# Patient Record
Sex: MALE | Race: WHITE | Employment: FULL TIME | URBAN - METROPOLITAN AREA
[De-identification: names, ages, dates, MRNs, and addresses within clinical notes are randomized per-mention and may not be internally consistent; named-entity substitution may affect disease eponyms.]

---

## 2017-02-16 ENCOUNTER — TELEPHONE (OUTPATIENT)
Dept: INTERNAL MEDICINE CLINIC | Age: 62
End: 2017-02-16

## 2017-02-16 RX ORDER — AMOXICILLIN 500 MG/1
500 CAPSULE ORAL 3 TIMES DAILY
Qty: 30 CAPSULE | Refills: 0 | Status: SHIPPED | OUTPATIENT
Start: 2017-02-16 | End: 2017-02-26

## 2017-03-30 DIAGNOSIS — G47.09 OTHER INSOMNIA: ICD-10-CM

## 2017-03-30 RX ORDER — ZOLPIDEM TARTRATE 5 MG/1
TABLET ORAL
Qty: 30 TABLET | Refills: 1 | OUTPATIENT
Start: 2017-03-30

## 2017-04-11 ENCOUNTER — TELEPHONE (OUTPATIENT)
Dept: INTERNAL MEDICINE CLINIC | Age: 62
End: 2017-04-11

## 2017-04-11 RX ORDER — AMOXICILLIN 500 MG/1
500 CAPSULE ORAL 3 TIMES DAILY
Qty: 30 CAPSULE | Refills: 0 | Status: SHIPPED | OUTPATIENT
Start: 2017-04-11 | End: 2017-04-21

## 2017-07-19 ENCOUNTER — OFFICE VISIT (OUTPATIENT)
Dept: FAMILY MEDICINE CLINIC | Age: 62
End: 2017-07-19

## 2017-07-19 VITALS
OXYGEN SATURATION: 95 % | RESPIRATION RATE: 16 BRPM | BODY MASS INDEX: 28.64 KG/M2 | HEIGHT: 68 IN | WEIGHT: 189 LBS | HEART RATE: 75 BPM | DIASTOLIC BLOOD PRESSURE: 86 MMHG | SYSTOLIC BLOOD PRESSURE: 128 MMHG

## 2017-07-19 DIAGNOSIS — I10 ESSENTIAL HYPERTENSION: ICD-10-CM

## 2017-07-19 DIAGNOSIS — E78.00 PURE HYPERCHOLESTEROLEMIA: ICD-10-CM

## 2017-07-19 DIAGNOSIS — K40.90 LEFT INGUINAL HERNIA: Primary | ICD-10-CM

## 2017-07-19 PROCEDURE — 99214 OFFICE O/P EST MOD 30 MIN: CPT | Performed by: INTERNAL MEDICINE

## 2017-07-19 RX ORDER — AMLODIPINE BESYLATE 10 MG/1
TABLET ORAL
Qty: 90 TABLET | Refills: 1 | Status: SHIPPED | OUTPATIENT
Start: 2017-07-19 | End: 2018-01-15 | Stop reason: SDUPTHER

## 2017-07-19 RX ORDER — OMEPRAZOLE 20 MG/1
20 CAPSULE, DELAYED RELEASE ORAL DAILY
COMMUNITY
End: 2021-11-30 | Stop reason: ALTCHOICE

## 2017-07-19 ASSESSMENT — ENCOUNTER SYMPTOMS: COUGH: 0

## 2017-07-19 ASSESSMENT — PATIENT HEALTH QUESTIONNAIRE - PHQ9
1. LITTLE INTEREST OR PLEASURE IN DOING THINGS: 0
SUM OF ALL RESPONSES TO PHQ QUESTIONS 1-9: 0
SUM OF ALL RESPONSES TO PHQ9 QUESTIONS 1 & 2: 0
2. FEELING DOWN, DEPRESSED OR HOPELESS: 0

## 2017-07-24 DIAGNOSIS — K40.90 LEFT INGUINAL HERNIA: ICD-10-CM

## 2017-07-24 DIAGNOSIS — E78.00 PURE HYPERCHOLESTEROLEMIA: ICD-10-CM

## 2017-07-24 DIAGNOSIS — I10 ESSENTIAL HYPERTENSION: ICD-10-CM

## 2017-07-24 LAB
A/G RATIO: 1.4 (ref 1.1–2.2)
ALBUMIN SERPL-MCNC: 4.2 G/DL (ref 3.4–5)
ALP BLD-CCNC: 80 U/L (ref 40–129)
ALT SERPL-CCNC: 17 U/L (ref 10–40)
ANION GAP SERPL CALCULATED.3IONS-SCNC: 13 MMOL/L (ref 3–16)
AST SERPL-CCNC: 19 U/L (ref 15–37)
BILIRUB SERPL-MCNC: 0.6 MG/DL (ref 0–1)
BUN BLDV-MCNC: 9 MG/DL (ref 7–20)
CALCIUM SERPL-MCNC: 9.1 MG/DL (ref 8.3–10.6)
CHLORIDE BLD-SCNC: 105 MMOL/L (ref 99–110)
CHOLESTEROL, TOTAL: 135 MG/DL (ref 0–199)
CO2: 25 MMOL/L (ref 21–32)
CREAT SERPL-MCNC: 0.8 MG/DL (ref 0.8–1.3)
GFR AFRICAN AMERICAN: >60
GFR NON-AFRICAN AMERICAN: >60
GLOBULIN: 3.1 G/DL
GLUCOSE BLD-MCNC: 108 MG/DL (ref 70–99)
HCT VFR BLD CALC: 46.1 % (ref 40.5–52.5)
HDLC SERPL-MCNC: 39 MG/DL (ref 40–60)
HEMOGLOBIN: 15.4 G/DL (ref 13.5–17.5)
LDL CHOLESTEROL CALCULATED: 68 MG/DL
MCH RBC QN AUTO: 30.8 PG (ref 26–34)
MCHC RBC AUTO-ENTMCNC: 33.5 G/DL (ref 31–36)
MCV RBC AUTO: 91.9 FL (ref 80–100)
PDW BLD-RTO: 14 % (ref 12.4–15.4)
PLATELET # BLD: 232 K/UL (ref 135–450)
PMV BLD AUTO: 9.8 FL (ref 5–10.5)
POTASSIUM SERPL-SCNC: 4.1 MMOL/L (ref 3.5–5.1)
RBC # BLD: 5.01 M/UL (ref 4.2–5.9)
SODIUM BLD-SCNC: 143 MMOL/L (ref 136–145)
TOTAL PROTEIN: 7.3 G/DL (ref 6.4–8.2)
TRIGL SERPL-MCNC: 138 MG/DL (ref 0–150)
VLDLC SERPL CALC-MCNC: 28 MG/DL
WBC # BLD: 7.4 K/UL (ref 4–11)

## 2017-07-25 ENCOUNTER — INITIAL CONSULT (OUTPATIENT)
Dept: SURGERY | Age: 62
End: 2017-07-25

## 2017-07-25 ENCOUNTER — SURG/PROC ORDERS (OUTPATIENT)
Dept: SURGERY | Age: 62
End: 2017-07-25

## 2017-07-25 VITALS
HEIGHT: 66 IN | DIASTOLIC BLOOD PRESSURE: 82 MMHG | HEART RATE: 74 BPM | BODY MASS INDEX: 30.47 KG/M2 | SYSTOLIC BLOOD PRESSURE: 134 MMHG | WEIGHT: 189.6 LBS

## 2017-07-25 DIAGNOSIS — K40.90 SCROTAL HERNIA: ICD-10-CM

## 2017-07-25 PROCEDURE — 99243 OFF/OP CNSLTJ NEW/EST LOW 30: CPT | Performed by: SURGERY

## 2017-07-25 RX ORDER — SODIUM CHLORIDE 0.9 % (FLUSH) 0.9 %
10 SYRINGE (ML) INJECTION PRN
Status: CANCELLED | OUTPATIENT
Start: 2017-07-25

## 2017-07-25 RX ORDER — SODIUM CHLORIDE 0.9 % (FLUSH) 0.9 %
10 SYRINGE (ML) INJECTION EVERY 12 HOURS SCHEDULED
Status: CANCELLED | OUTPATIENT
Start: 2017-07-25

## 2017-07-27 ENCOUNTER — PAT TELEPHONE (OUTPATIENT)
Dept: PREADMISSION TESTING | Age: 62
End: 2017-07-27

## 2017-07-28 VITALS — WEIGHT: 180 LBS | BODY MASS INDEX: 28.25 KG/M2 | HEIGHT: 67 IN

## 2017-08-02 ENCOUNTER — OFFICE VISIT (OUTPATIENT)
Dept: FAMILY MEDICINE CLINIC | Age: 62
End: 2017-08-02

## 2017-08-02 VITALS
OXYGEN SATURATION: 98 % | DIASTOLIC BLOOD PRESSURE: 78 MMHG | TEMPERATURE: 99 F | HEIGHT: 66 IN | WEIGHT: 188.6 LBS | RESPIRATION RATE: 16 BRPM | HEART RATE: 80 BPM | BODY MASS INDEX: 30.31 KG/M2 | SYSTOLIC BLOOD PRESSURE: 118 MMHG

## 2017-08-02 DIAGNOSIS — E78.00 PURE HYPERCHOLESTEROLEMIA: ICD-10-CM

## 2017-08-02 DIAGNOSIS — I10 ESSENTIAL HYPERTENSION: ICD-10-CM

## 2017-08-02 DIAGNOSIS — Z01.818 PREOP EXAMINATION: ICD-10-CM

## 2017-08-02 DIAGNOSIS — K40.90 SCROTAL HERNIA: Primary | ICD-10-CM

## 2017-08-02 PROCEDURE — 93000 ELECTROCARDIOGRAM COMPLETE: CPT | Performed by: INTERNAL MEDICINE

## 2017-08-02 PROCEDURE — 99243 OFF/OP CNSLTJ NEW/EST LOW 30: CPT | Performed by: INTERNAL MEDICINE

## 2017-08-09 ENCOUNTER — HOSPITAL ENCOUNTER (OUTPATIENT)
Dept: SURGERY | Age: 62
Discharge: HOME | End: 2017-08-09
Attending: SURGERY | Admitting: SURGERY

## 2017-08-09 VITALS
WEIGHT: 188 LBS | BODY MASS INDEX: 30.22 KG/M2 | DIASTOLIC BLOOD PRESSURE: 89 MMHG | RESPIRATION RATE: 18 BRPM | OXYGEN SATURATION: 93 % | HEIGHT: 66 IN | SYSTOLIC BLOOD PRESSURE: 128 MMHG | HEART RATE: 86 BPM

## 2017-08-09 PROCEDURE — 49650 LAP ING HERNIA REPAIR INIT: CPT | Performed by: SURGERY

## 2017-08-09 RX ORDER — OXYCODONE HYDROCHLORIDE AND ACETAMINOPHEN 5; 325 MG/1; MG/1
1 TABLET ORAL PRN
Status: ACTIVE | OUTPATIENT
Start: 2017-08-09 | End: 2017-08-09

## 2017-08-09 RX ORDER — MIDAZOLAM HYDROCHLORIDE 1 MG/ML
2 INJECTION INTRAMUSCULAR; INTRAVENOUS ONCE
Status: DISCONTINUED | OUTPATIENT
Start: 2017-08-09 | End: 2017-08-09

## 2017-08-09 RX ORDER — OXYCODONE HYDROCHLORIDE AND ACETAMINOPHEN 5; 325 MG/1; MG/1
1-2 TABLET ORAL EVERY 4 HOURS PRN
Qty: 20 TABLET | Refills: 0 | Status: SHIPPED | OUTPATIENT
Start: 2017-08-09 | End: 2017-08-16

## 2017-08-09 RX ORDER — DIPHENHYDRAMINE HYDROCHLORIDE 50 MG/ML
12.5 INJECTION INTRAMUSCULAR; INTRAVENOUS
Status: ACTIVE | OUTPATIENT
Start: 2017-08-09 | End: 2017-08-09

## 2017-08-09 RX ORDER — OXYCODONE HYDROCHLORIDE AND ACETAMINOPHEN 5; 325 MG/1; MG/1
2 TABLET ORAL PRN
Status: ACTIVE | OUTPATIENT
Start: 2017-08-09 | End: 2017-08-09

## 2017-08-09 RX ORDER — ONDANSETRON 2 MG/ML
4 INJECTION INTRAMUSCULAR; INTRAVENOUS
Status: ACTIVE | OUTPATIENT
Start: 2017-08-09 | End: 2017-08-09

## 2017-08-09 RX ORDER — PROMETHAZINE HYDROCHLORIDE 25 MG/ML
6.25 INJECTION, SOLUTION INTRAMUSCULAR; INTRAVENOUS
Status: ACTIVE | OUTPATIENT
Start: 2017-08-09 | End: 2017-08-09

## 2017-08-09 RX ORDER — SODIUM CHLORIDE 0.9 % (FLUSH) 0.9 %
10 SYRINGE (ML) INJECTION EVERY 12 HOURS SCHEDULED
Status: DISCONTINUED | OUTPATIENT
Start: 2017-08-09 | End: 2017-08-10 | Stop reason: HOSPADM

## 2017-08-09 RX ORDER — MORPHINE SULFATE 2 MG/ML
1 INJECTION, SOLUTION INTRAMUSCULAR; INTRAVENOUS EVERY 5 MIN PRN
Status: DISCONTINUED | OUTPATIENT
Start: 2017-08-09 | End: 2017-08-10 | Stop reason: HOSPADM

## 2017-08-09 RX ORDER — SODIUM CHLORIDE 0.9 % (FLUSH) 0.9 %
10 SYRINGE (ML) INJECTION PRN
Status: DISCONTINUED | OUTPATIENT
Start: 2017-08-09 | End: 2017-08-10 | Stop reason: HOSPADM

## 2017-08-09 RX ORDER — SODIUM CHLORIDE, SODIUM LACTATE, POTASSIUM CHLORIDE, CALCIUM CHLORIDE 600; 310; 30; 20 MG/100ML; MG/100ML; MG/100ML; MG/100ML
INJECTION, SOLUTION INTRAVENOUS CONTINUOUS
Status: DISCONTINUED | OUTPATIENT
Start: 2017-08-09 | End: 2017-08-10 | Stop reason: HOSPADM

## 2017-08-09 RX ORDER — HYDRALAZINE HYDROCHLORIDE 20 MG/ML
5 INJECTION INTRAMUSCULAR; INTRAVENOUS EVERY 10 MIN PRN
Status: DISCONTINUED | OUTPATIENT
Start: 2017-08-09 | End: 2017-08-10 | Stop reason: HOSPADM

## 2017-08-09 RX ORDER — MORPHINE SULFATE 2 MG/ML
2 INJECTION, SOLUTION INTRAMUSCULAR; INTRAVENOUS EVERY 5 MIN PRN
Status: DISCONTINUED | OUTPATIENT
Start: 2017-08-09 | End: 2017-08-10 | Stop reason: HOSPADM

## 2017-08-09 RX ORDER — LABETALOL HYDROCHLORIDE 5 MG/ML
5 INJECTION, SOLUTION INTRAVENOUS EVERY 10 MIN PRN
Status: DISCONTINUED | OUTPATIENT
Start: 2017-08-09 | End: 2017-08-10 | Stop reason: HOSPADM

## 2017-08-09 RX ORDER — MEPERIDINE HYDROCHLORIDE 50 MG/ML
12.5 INJECTION INTRAMUSCULAR; INTRAVENOUS; SUBCUTANEOUS EVERY 5 MIN PRN
Status: DISCONTINUED | OUTPATIENT
Start: 2017-08-09 | End: 2017-08-10 | Stop reason: HOSPADM

## 2017-08-09 RX ADMIN — LABETALOL HYDROCHLORIDE 5 MG: 5 INJECTION, SOLUTION INTRAVENOUS at 18:56

## 2017-08-09 RX ADMIN — HYDRALAZINE HYDROCHLORIDE 5 MG: 20 INJECTION INTRAMUSCULAR; INTRAVENOUS at 19:18

## 2017-08-09 RX ADMIN — SODIUM CHLORIDE, SODIUM LACTATE, POTASSIUM CHLORIDE, CALCIUM CHLORIDE: 600; 310; 30; 20 INJECTION, SOLUTION INTRAVENOUS at 11:36

## 2017-08-09 ASSESSMENT — PAIN - FUNCTIONAL ASSESSMENT: PAIN_FUNCTIONAL_ASSESSMENT: 0-10

## 2017-08-09 ASSESSMENT — PAIN SCALES - GENERAL: PAINLEVEL_OUTOF10: 0

## 2017-08-22 ENCOUNTER — OFFICE VISIT (OUTPATIENT)
Dept: SURGERY | Age: 62
End: 2017-08-22

## 2017-08-22 VITALS
DIASTOLIC BLOOD PRESSURE: 85 MMHG | HEIGHT: 66 IN | HEART RATE: 70 BPM | WEIGHT: 186.2 LBS | BODY MASS INDEX: 29.92 KG/M2 | SYSTOLIC BLOOD PRESSURE: 133 MMHG

## 2017-08-22 DIAGNOSIS — Z09 POSTOP CHECK: ICD-10-CM

## 2017-08-22 PROCEDURE — 99024 POSTOP FOLLOW-UP VISIT: CPT | Performed by: SURGERY

## 2017-08-28 DIAGNOSIS — E78.00 PURE HYPERCHOLESTEROLEMIA: ICD-10-CM

## 2017-08-29 RX ORDER — ATORVASTATIN CALCIUM 20 MG/1
TABLET, FILM COATED ORAL
Qty: 30 TABLET | Refills: 4 | Status: SHIPPED | OUTPATIENT
Start: 2017-08-29 | End: 2018-01-15 | Stop reason: SDUPTHER

## 2017-09-15 ENCOUNTER — OFFICE VISIT (OUTPATIENT)
Dept: SURGERY | Age: 62
End: 2017-09-15

## 2017-09-15 VITALS
BODY MASS INDEX: 29.41 KG/M2 | DIASTOLIC BLOOD PRESSURE: 84 MMHG | WEIGHT: 183 LBS | HEIGHT: 66 IN | SYSTOLIC BLOOD PRESSURE: 121 MMHG | HEART RATE: 77 BPM

## 2017-09-15 DIAGNOSIS — K40.91 RECURRENT INGUINAL HERNIA OF LEFT SIDE WITHOUT OBSTRUCTION OR GANGRENE: ICD-10-CM

## 2017-09-15 PROCEDURE — 99213 OFFICE O/P EST LOW 20 MIN: CPT | Performed by: SURGERY

## 2017-09-17 ENCOUNTER — SURG/PROC ORDERS (OUTPATIENT)
Dept: SURGERY | Age: 62
End: 2017-09-17

## 2017-09-17 RX ORDER — SODIUM CHLORIDE 0.9 % (FLUSH) 0.9 %
10 SYRINGE (ML) INJECTION EVERY 12 HOURS SCHEDULED
Status: CANCELLED | OUTPATIENT
Start: 2017-09-17

## 2017-09-17 RX ORDER — SODIUM CHLORIDE 0.9 % (FLUSH) 0.9 %
10 SYRINGE (ML) INJECTION PRN
Status: CANCELLED | OUTPATIENT
Start: 2017-09-17

## 2017-09-19 ENCOUNTER — OFFICE VISIT (OUTPATIENT)
Dept: FAMILY MEDICINE CLINIC | Age: 62
End: 2017-09-19

## 2017-09-19 VITALS
HEART RATE: 85 BPM | WEIGHT: 187 LBS | SYSTOLIC BLOOD PRESSURE: 132 MMHG | DIASTOLIC BLOOD PRESSURE: 76 MMHG | HEIGHT: 66 IN | OXYGEN SATURATION: 96 % | BODY MASS INDEX: 30.05 KG/M2

## 2017-09-19 DIAGNOSIS — Z23 FLU VACCINE NEED: ICD-10-CM

## 2017-09-19 DIAGNOSIS — Z01.818 PREOP EXAMINATION: Primary | ICD-10-CM

## 2017-09-19 DIAGNOSIS — K40.91 RECURRENT INGUINAL HERNIA OF LEFT SIDE WITHOUT OBSTRUCTION OR GANGRENE: ICD-10-CM

## 2017-09-19 PROCEDURE — 99213 OFFICE O/P EST LOW 20 MIN: CPT | Performed by: NURSE PRACTITIONER

## 2017-09-19 PROCEDURE — 90688 IIV4 VACCINE SPLT 0.5 ML IM: CPT | Performed by: NURSE PRACTITIONER

## 2017-09-19 PROCEDURE — 90471 IMMUNIZATION ADMIN: CPT | Performed by: NURSE PRACTITIONER

## 2017-09-21 ENCOUNTER — TELEPHONE (OUTPATIENT)
Dept: SURGERY | Age: 62
End: 2017-09-21

## 2017-10-10 ENCOUNTER — OFFICE VISIT (OUTPATIENT)
Dept: SURGERY | Age: 62
End: 2017-10-10

## 2017-10-10 VITALS
BODY MASS INDEX: 30.34 KG/M2 | SYSTOLIC BLOOD PRESSURE: 131 MMHG | HEART RATE: 84 BPM | HEIGHT: 66 IN | DIASTOLIC BLOOD PRESSURE: 83 MMHG | WEIGHT: 188.8 LBS

## 2017-10-10 DIAGNOSIS — Z09 POSTOP CHECK: Primary | ICD-10-CM

## 2017-10-10 PROCEDURE — 99024 POSTOP FOLLOW-UP VISIT: CPT | Performed by: SURGERY

## 2017-10-13 NOTE — PROGRESS NOTES
Surgery Post-op Progress Note    HPI:  Notes reviewed, and agree with documentation in pt's chart. Postoperative Follow-up: Patient presents for 2 week follow-up status post attempted robotic, converted to open repair of recurrent large LIH w/ onlay mesh . Eating a regular diet without difficulty. Bowel movements are Normal.  Pain is controlled with current analgesics. Medication(s) being used: narcotic analgesics including oxycodone/acetaminophen (Percocet, Tylox). .     ROS:    · 10 point review of systems performed; please refer to HPI with pertinent positives, all other ROS are negative    PE:   CONSTITUTIONAL:  awake and alert    ABDOMEN: soft, non-distended, non-tender     INCISION: clean, dry; scrotum w/ residual induration c/w retained hernia sac/seroma, minimally tender      ASSESSMENT:  1.  Postop check     ·       PLAN:    Continue with routine wound care as discussed  Gradually increase activities as tolerated  Follow-up in 2-4 weeks or as needed; please call with questions or concerns  ·

## 2017-10-13 NOTE — PATIENT INSTRUCTIONS
· Continue with routine wound care as discussed  · Gradually increase activities as tolerated  · Follow-up in 2-4 weeks or as needed; please call with questions or concerns

## 2017-10-16 ENCOUNTER — OFFICE VISIT (OUTPATIENT)
Dept: FAMILY MEDICINE CLINIC | Age: 62
End: 2017-10-16

## 2017-10-16 VITALS
OXYGEN SATURATION: 97 % | SYSTOLIC BLOOD PRESSURE: 132 MMHG | DIASTOLIC BLOOD PRESSURE: 86 MMHG | HEART RATE: 89 BPM | WEIGHT: 185 LBS | RESPIRATION RATE: 16 BRPM | HEIGHT: 66 IN | BODY MASS INDEX: 29.73 KG/M2

## 2017-10-16 DIAGNOSIS — I10 ESSENTIAL HYPERTENSION: Primary | ICD-10-CM

## 2017-10-16 DIAGNOSIS — R39.15 URINARY URGENCY: ICD-10-CM

## 2017-10-16 DIAGNOSIS — E78.00 PURE HYPERCHOLESTEROLEMIA: ICD-10-CM

## 2017-10-16 PROCEDURE — 99214 OFFICE O/P EST MOD 30 MIN: CPT | Performed by: INTERNAL MEDICINE

## 2017-10-16 RX ORDER — AMLODIPINE BESYLATE 10 MG/1
TABLET ORAL
Qty: 90 TABLET | Refills: 1 | Status: CANCELLED | OUTPATIENT
Start: 2017-10-16

## 2017-10-16 RX ORDER — TAMSULOSIN HYDROCHLORIDE 0.4 MG/1
0.4 CAPSULE ORAL DAILY
Qty: 30 CAPSULE | Refills: 0 | Status: CANCELLED | OUTPATIENT
Start: 2017-10-16

## 2017-10-16 ASSESSMENT — ENCOUNTER SYMPTOMS: COUGH: 0

## 2017-10-16 NOTE — PROGRESS NOTES
Subjective:      Patient ID: Sydney Causey is a 57 y.o. male. HPI    Review of Systems   Constitutional: Negative for activity change. HENT: Negative for congestion. Respiratory: Negative for cough. Cardiovascular: Negative for chest pain. Endocrine: Negative. Genitourinary: Negative. Musculoskeletal: Positive for arthralgias. Allergic/Immunologic: Positive for environmental allergies. Psychiatric/Behavioral: Positive for sleep disturbance.      Lab Results   Component Value Date    WBC 11.5 (H) 09/28/2017    HGB 13.9 09/28/2017    HCT 41.5 09/28/2017    MCV 89.8 09/28/2017     09/28/2017     Lab Results   Component Value Date    CHOL 135 07/24/2017    CHOL 157 11/26/2016    CHOL 160 01/25/2016     Lab Results   Component Value Date    TRIG 138 07/24/2017    TRIG 140 11/26/2016    TRIG 105 01/25/2016     Lab Results   Component Value Date    HDL 39 (L) 07/24/2017    HDL 39 (L) 11/26/2016    HDL 40 01/25/2016     Lab Results   Component Value Date    LDLCALC 68 07/24/2017    LDLCALC 90 11/26/2016    LDLCALC 99 01/25/2016     Lab Results   Component Value Date    LABVLDL 28 07/24/2017    LABVLDL 28 11/26/2016    LABVLDL 21 01/25/2016     No results found for: [facility]    Chemistry        Component Value Date/Time     (L) 09/28/2017 0643    K 4.3 09/28/2017 0643    CL 98 (L) 09/28/2017 0643    CO2 26 09/28/2017 0643    BUN 10 09/28/2017 0643    CREATININE 0.9 09/28/2017 0643        Component Value Date/Time    CALCIUM 8.2 (L) 09/28/2017 0643    ALKPHOS 80 07/24/2017 0819    AST 19 07/24/2017 0819    ALT 17 07/24/2017 0819    BILITOT 0.6 07/24/2017 0819            Patient Active Problem List   Diagnosis    Hypertension    Hyperlipidemia    Insomnia    Acute sinusitis    Allergic rhinitis    Scrotal hernia    Postop check    Recurrent inguinal hernia of left side without obstruction or gangrene       Outpatient Prescriptions Marked as Taking for the 10/16/17 encounter (Office Visit) with Tyler Monge MD   Medication Sig Dispense Refill    tamsulosin (FLOMAX) 0.4 MG capsule Take 1 capsule by mouth daily 30 capsule 0    atorvastatin (LIPITOR) 20 MG tablet TAKE 1 TABLET BY MOUTH ONE TIME A DAY  30 tablet 4    omeprazole (PRILOSEC) 20 MG delayed release capsule Take 20 mg by mouth daily      amLODIPine (NORVASC) 10 MG tablet TAKE ONE TABLET BY MOUTH DAILY 90 tablet 1       Allergies   Allergen Reactions    Guaifenesin Er Other (See Comments)     \"made my cough worse and fluid built up in my lungs\"    Z-Vik [Azithromycin] Other (See Comments)     Closes up sinuses    Erythromycin Nausea And Vomiting    Vicodin [Hydrocodone-Acetaminophen] Nausea And Vomiting       Social History   Substance Use Topics    Smoking status: Never Smoker    Smokeless tobacco: Never Used    Alcohol use No       Objective:   /86 (Site: Left Arm, Position: Sitting, Cuff Size: Large Adult)   Pulse 89   Resp 16   Ht 5' 6\" (1.676 m)   Wt 185 lb (83.9 kg)   SpO2 97%   BMI 29.86 kg/m²     Physical Exam   Constitutional: He appears well-developed and well-nourished. HENT:   Head: Normocephalic. Nose: Nose normal.          Neck: Normal range of motion. Neck supple. Cardiovascular: Normal rate and regular rhythm. Pulmonary/Chest: Effort normal and breath sounds normal.   Abdominal: Soft. Musculoskeletal: Normal range of motion. He exhibits no edema. Psychiatric: He has a normal mood and affect. Nursing note and vitals reviewed. Assessment:/plan:     1. Essential hypertension      2. Pure hypercholesterolemia      3.  Urinary urgency  -on flomax                 Tyler Monge MD

## 2017-11-28 ENCOUNTER — OFFICE VISIT (OUTPATIENT)
Dept: SURGERY | Age: 62
End: 2017-11-28

## 2017-11-28 VITALS
HEIGHT: 66 IN | WEIGHT: 188.4 LBS | HEART RATE: 71 BPM | SYSTOLIC BLOOD PRESSURE: 136 MMHG | DIASTOLIC BLOOD PRESSURE: 86 MMHG | BODY MASS INDEX: 30.28 KG/M2

## 2017-11-28 DIAGNOSIS — Z09 POSTOP CHECK: Primary | ICD-10-CM

## 2017-11-28 PROCEDURE — 99024 POSTOP FOLLOW-UP VISIT: CPT | Performed by: SURGERY

## 2017-11-28 NOTE — PROGRESS NOTES
Surgery Post-op Progress Note    HPI:  Notes reviewed, and agree with documentation in pt's chart. Postoperative Follow-up: Patient presents for 2d POV s/p open repair of large LIH. Doing well, minimal residual L groin pain, swelling per patient. Resuming normal activities. ROS:    · 10 point review of systems performed; please refer to HPI with pertinent positives, all other ROS are negative    PE:   CONSTITUTIONAL:  awake and alert    ABDOMEN: soft, non-distended, non-tender     INCISION: clean, dry, no drainage, healed, minimal, resolving induration over inguinal canal on left      ASSESSMENT:  1.  Postop check     · Doing well, good healing, no signs of residual or recurrent hernia      PLAN:    Continue with routine wound care as discussed  Gradually increase activities as tolerated  Follow-up as needed; please call with questions or concerns  ·

## 2017-12-27 DIAGNOSIS — I10 ESSENTIAL HYPERTENSION: ICD-10-CM

## 2017-12-27 DIAGNOSIS — E78.00 PURE HYPERCHOLESTEROLEMIA: Primary | ICD-10-CM

## 2017-12-27 LAB
A/G RATIO: 1.4 (ref 1.1–2.2)
ALBUMIN SERPL-MCNC: 4.7 G/DL (ref 3.4–5)
ALP BLD-CCNC: 92 U/L (ref 40–129)
ALT SERPL-CCNC: 14 U/L (ref 10–40)
ANION GAP SERPL CALCULATED.3IONS-SCNC: 15 MMOL/L (ref 3–16)
AST SERPL-CCNC: 16 U/L (ref 15–37)
BILIRUB SERPL-MCNC: 0.6 MG/DL (ref 0–1)
BUN BLDV-MCNC: 15 MG/DL (ref 7–20)
CALCIUM SERPL-MCNC: 9.9 MG/DL (ref 8.3–10.6)
CHLORIDE BLD-SCNC: 103 MMOL/L (ref 99–110)
CHOLESTEROL, TOTAL: 186 MG/DL (ref 0–199)
CO2: 27 MMOL/L (ref 21–32)
CREAT SERPL-MCNC: 1 MG/DL (ref 0.8–1.3)
GFR AFRICAN AMERICAN: >60
GFR NON-AFRICAN AMERICAN: >60
GLOBULIN: 3.4 G/DL
GLUCOSE BLD-MCNC: 102 MG/DL (ref 70–99)
HDLC SERPL-MCNC: 41 MG/DL (ref 40–60)
LDL CHOLESTEROL CALCULATED: 113 MG/DL
POTASSIUM SERPL-SCNC: 4.6 MMOL/L (ref 3.5–5.1)
SODIUM BLD-SCNC: 145 MMOL/L (ref 136–145)
TOTAL PROTEIN: 8.1 G/DL (ref 6.4–8.2)
TRIGL SERPL-MCNC: 159 MG/DL (ref 0–150)
VLDLC SERPL CALC-MCNC: 32 MG/DL

## 2018-01-15 ENCOUNTER — OFFICE VISIT (OUTPATIENT)
Dept: FAMILY MEDICINE CLINIC | Age: 63
End: 2018-01-15

## 2018-01-15 VITALS
WEIGHT: 187 LBS | HEIGHT: 66 IN | DIASTOLIC BLOOD PRESSURE: 90 MMHG | HEART RATE: 84 BPM | SYSTOLIC BLOOD PRESSURE: 128 MMHG | BODY MASS INDEX: 30.05 KG/M2 | OXYGEN SATURATION: 95 %

## 2018-01-15 DIAGNOSIS — I10 ESSENTIAL HYPERTENSION: Primary | ICD-10-CM

## 2018-01-15 DIAGNOSIS — K21.9 GASTROESOPHAGEAL REFLUX DISEASE WITHOUT ESOPHAGITIS: ICD-10-CM

## 2018-01-15 DIAGNOSIS — E78.00 PURE HYPERCHOLESTEROLEMIA: ICD-10-CM

## 2018-01-15 DIAGNOSIS — I10 ESSENTIAL HYPERTENSION: ICD-10-CM

## 2018-01-15 PROCEDURE — G8417 CALC BMI ABV UP PARAM F/U: HCPCS | Performed by: INTERNAL MEDICINE

## 2018-01-15 PROCEDURE — G8484 FLU IMMUNIZE NO ADMIN: HCPCS | Performed by: INTERNAL MEDICINE

## 2018-01-15 PROCEDURE — 1036F TOBACCO NON-USER: CPT | Performed by: INTERNAL MEDICINE

## 2018-01-15 PROCEDURE — G8427 DOCREV CUR MEDS BY ELIG CLIN: HCPCS | Performed by: INTERNAL MEDICINE

## 2018-01-15 PROCEDURE — 3017F COLORECTAL CA SCREEN DOC REV: CPT | Performed by: INTERNAL MEDICINE

## 2018-01-15 PROCEDURE — 99214 OFFICE O/P EST MOD 30 MIN: CPT | Performed by: INTERNAL MEDICINE

## 2018-01-15 RX ORDER — AMLODIPINE BESYLATE 10 MG/1
TABLET ORAL
Qty: 90 TABLET | Refills: 1 | Status: CANCELLED | OUTPATIENT
Start: 2018-01-15

## 2018-01-15 RX ORDER — AMLODIPINE BESYLATE 10 MG/1
TABLET ORAL
Qty: 90 TABLET | Refills: 1 | Status: SHIPPED | OUTPATIENT
Start: 2018-01-15 | End: 2018-06-04 | Stop reason: SDUPTHER

## 2018-01-15 RX ORDER — ATORVASTATIN CALCIUM 20 MG/1
20 TABLET, FILM COATED ORAL DAILY
Qty: 90 TABLET | Refills: 1 | Status: SHIPPED | OUTPATIENT
Start: 2018-01-15 | End: 2018-05-01 | Stop reason: SDUPTHER

## 2018-01-15 ASSESSMENT — ENCOUNTER SYMPTOMS: COUGH: 0

## 2018-01-15 NOTE — PROGRESS NOTES
Subjective:      Patient ID: Brady Joseph is a 60 y.o. male.     HPI    Review of Systems    Objective:   Physical Exam    Assessment:      ***      Plan:      ***

## 2018-01-15 NOTE — PATIENT INSTRUCTIONS
you take decongestants or anti-inflammatory medicine, such as ibuprofen. Some of these medicines can raise blood pressure. · Learn how to check your blood pressure at home. Lifestyle changes  · Stay at a healthy weight. This is especially important if you put on weight around the waist. Losing even 10 pounds can help you lower your blood pressure. · If your doctor recommends it, get more exercise. Walking is a good choice. Bit by bit, increase the amount you walk every day. Try for at least 30 minutes on most days of the week. You also may want to swim, bike, or do other activities. · Avoid or limit alcohol. Talk to your doctor about whether you can drink any alcohol. · Try to limit how much sodium you eat to less than 2,300 milligrams (mg) a day. Your doctor may ask you to try to eat less than 1,500 mg a day. · Eat plenty of fruits (such as bananas and oranges), vegetables, legumes, whole grains, and low-fat dairy products. · Lower the amount of saturated fat in your diet. Saturated fat is found in animal products such as milk, cheese, and meat. Limiting these foods may help you lose weight and also lower your risk for heart disease. · Do not smoke. Smoking increases your risk for heart attack and stroke. If you need help quitting, talk to your doctor about stop-smoking programs and medicines. These can increase your chances of quitting for good. When should you call for help? Call 911 anytime you think you may need emergency care. This may mean having symptoms that suggest that your blood pressure is causing a serious heart or blood vessel problem. Your blood pressure may be over 180/110. ? For example, call 911 if:  ? · You have symptoms of a heart attack. These may include:  ¨ Chest pain or pressure, or a strange feeling in the chest.  ¨ Sweating. ¨ Shortness of breath. ¨ Nausea or vomiting.   ¨ Pain, pressure, or a strange feeling in the back, neck, jaw, or upper belly or in one or both shoulders or arms.  ¨ Lightheadedness or sudden weakness. ¨ A fast or irregular heartbeat. ? · You have symptoms of a stroke. These may include:  ¨ Sudden numbness, tingling, weakness, or loss of movement in your face, arm, or leg, especially on only one side of your body. ¨ Sudden vision changes. ¨ Sudden trouble speaking. ¨ Sudden confusion or trouble understanding simple statements. ¨ Sudden problems with walking or balance. ¨ A sudden, severe headache that is different from past headaches. ? · You have severe back or belly pain. ?Do not wait until your blood pressure comes down on its own. Get help right away. ?Call your doctor now or seek immediate care if:  ? · Your blood pressure is much higher than normal (such as 180/110 or higher), but you don't have symptoms. ? · You think high blood pressure is causing symptoms, such as:  ¨ Severe headache. ¨ Blurry vision. ? Watch closely for changes in your health, and be sure to contact your doctor if:  ? · Your blood pressure measures 140/90 or higher at least 2 times. That means the top number is 140 or higher or the bottom number is 90 or higher, or both. ? · You think you may be having side effects from your blood pressure medicine. ? · Your blood pressure is usually normal, but it goes above normal at least 2 times. Where can you learn more? Go to https://[email or web address]/Protect. org and sign in to your [employer] account. Enter Z951 in the Cotap box to learn more about \"High Blood Pressure: Care Instructions. \"     If you do not have an account, please click on the \"Sign Up Now\" link. Current as of: Vandana 10, 2017  Content Version: 11.5  © 8642-1450 Healthwise, Incorporated. Care instructions adapted under license by [facility] [employer] [facility] ([facility]).  If you have questions about a medical condition or this instruction, always ask your healthcare professional. Siria Watkins any warranty or liability for your use of this

## 2018-04-12 PROBLEM — Z09 POSTOP CHECK: Status: RESOLVED | Noted: 2017-08-22 | Resolved: 2018-04-12

## 2018-05-01 DIAGNOSIS — E78.00 PURE HYPERCHOLESTEROLEMIA: ICD-10-CM

## 2018-05-02 RX ORDER — ATORVASTATIN CALCIUM 20 MG/1
TABLET, FILM COATED ORAL
Qty: 90 TABLET | Refills: 0 | Status: SHIPPED | OUTPATIENT
Start: 2018-05-02 | End: 2018-10-01 | Stop reason: SDUPTHER

## 2018-06-04 ENCOUNTER — OFFICE VISIT (OUTPATIENT)
Dept: FAMILY MEDICINE CLINIC | Age: 63
End: 2018-06-04

## 2018-06-04 VITALS
HEIGHT: 66 IN | OXYGEN SATURATION: 97 % | WEIGHT: 191 LBS | HEART RATE: 70 BPM | DIASTOLIC BLOOD PRESSURE: 84 MMHG | SYSTOLIC BLOOD PRESSURE: 122 MMHG | BODY MASS INDEX: 30.7 KG/M2 | RESPIRATION RATE: 16 BRPM

## 2018-06-04 DIAGNOSIS — K21.9 GASTROESOPHAGEAL REFLUX DISEASE WITHOUT ESOPHAGITIS: ICD-10-CM

## 2018-06-04 DIAGNOSIS — E78.00 PURE HYPERCHOLESTEROLEMIA: ICD-10-CM

## 2018-06-04 DIAGNOSIS — I10 ESSENTIAL HYPERTENSION: Primary | ICD-10-CM

## 2018-06-04 DIAGNOSIS — R21 SKIN RASH: ICD-10-CM

## 2018-06-04 PROCEDURE — G8427 DOCREV CUR MEDS BY ELIG CLIN: HCPCS | Performed by: INTERNAL MEDICINE

## 2018-06-04 PROCEDURE — G8417 CALC BMI ABV UP PARAM F/U: HCPCS | Performed by: INTERNAL MEDICINE

## 2018-06-04 PROCEDURE — 3017F COLORECTAL CA SCREEN DOC REV: CPT | Performed by: INTERNAL MEDICINE

## 2018-06-04 PROCEDURE — 99214 OFFICE O/P EST MOD 30 MIN: CPT | Performed by: INTERNAL MEDICINE

## 2018-06-04 PROCEDURE — 1036F TOBACCO NON-USER: CPT | Performed by: INTERNAL MEDICINE

## 2018-06-04 RX ORDER — AMLODIPINE BESYLATE 10 MG/1
TABLET ORAL
Qty: 90 TABLET | Refills: 1 | Status: SHIPPED | OUTPATIENT
Start: 2018-06-04 | End: 2019-01-07 | Stop reason: SDUPTHER

## 2018-06-04 RX ORDER — FLUOCINONIDE 0.5 MG/G
OINTMENT TOPICAL
Qty: 15 G | Refills: 1 | Status: SHIPPED | OUTPATIENT
Start: 2018-06-04 | End: 2018-06-11

## 2018-06-04 ASSESSMENT — ENCOUNTER SYMPTOMS: COUGH: 0

## 2018-06-15 DIAGNOSIS — I10 ESSENTIAL HYPERTENSION: ICD-10-CM

## 2018-06-15 DIAGNOSIS — E78.00 PURE HYPERCHOLESTEROLEMIA: ICD-10-CM

## 2018-06-16 LAB
A/G RATIO: 1.6 (ref 1.1–2.2)
ALBUMIN SERPL-MCNC: 4.5 G/DL (ref 3.4–5)
ALP BLD-CCNC: 81 U/L (ref 40–129)
ALT SERPL-CCNC: 16 U/L (ref 10–40)
ANION GAP SERPL CALCULATED.3IONS-SCNC: 12 MMOL/L (ref 3–16)
AST SERPL-CCNC: 17 U/L (ref 15–37)
BILIRUB SERPL-MCNC: 0.8 MG/DL (ref 0–1)
BUN BLDV-MCNC: 13 MG/DL (ref 7–20)
CALCIUM SERPL-MCNC: 9.4 MG/DL (ref 8.3–10.6)
CHLORIDE BLD-SCNC: 97 MMOL/L (ref 99–110)
CHOLESTEROL, TOTAL: 152 MG/DL (ref 0–199)
CO2: 29 MMOL/L (ref 21–32)
CREAT SERPL-MCNC: 0.8 MG/DL (ref 0.8–1.3)
GFR AFRICAN AMERICAN: >60
GFR NON-AFRICAN AMERICAN: >60
GLOBULIN: 2.8 G/DL
GLUCOSE BLD-MCNC: 96 MG/DL (ref 70–99)
HDLC SERPL-MCNC: 43 MG/DL (ref 40–60)
LDL CHOLESTEROL CALCULATED: 86 MG/DL
POTASSIUM SERPL-SCNC: 4.7 MMOL/L (ref 3.5–5.1)
SODIUM BLD-SCNC: 138 MMOL/L (ref 136–145)
TOTAL PROTEIN: 7.3 G/DL (ref 6.4–8.2)
TRIGL SERPL-MCNC: 116 MG/DL (ref 0–150)
VLDLC SERPL CALC-MCNC: 23 MG/DL

## 2018-10-01 ENCOUNTER — OFFICE VISIT (OUTPATIENT)
Dept: FAMILY MEDICINE CLINIC | Age: 63
End: 2018-10-01
Payer: COMMERCIAL

## 2018-10-01 VITALS
HEIGHT: 66 IN | HEART RATE: 92 BPM | WEIGHT: 185 LBS | DIASTOLIC BLOOD PRESSURE: 80 MMHG | SYSTOLIC BLOOD PRESSURE: 128 MMHG | OXYGEN SATURATION: 96 % | BODY MASS INDEX: 29.73 KG/M2

## 2018-10-01 DIAGNOSIS — I10 ESSENTIAL HYPERTENSION: Primary | ICD-10-CM

## 2018-10-01 DIAGNOSIS — K21.9 GASTROESOPHAGEAL REFLUX DISEASE WITHOUT ESOPHAGITIS: ICD-10-CM

## 2018-10-01 DIAGNOSIS — E78.00 PURE HYPERCHOLESTEROLEMIA: ICD-10-CM

## 2018-10-01 PROCEDURE — G8427 DOCREV CUR MEDS BY ELIG CLIN: HCPCS | Performed by: INTERNAL MEDICINE

## 2018-10-01 PROCEDURE — 99214 OFFICE O/P EST MOD 30 MIN: CPT | Performed by: INTERNAL MEDICINE

## 2018-10-01 PROCEDURE — G8484 FLU IMMUNIZE NO ADMIN: HCPCS | Performed by: INTERNAL MEDICINE

## 2018-10-01 PROCEDURE — 1036F TOBACCO NON-USER: CPT | Performed by: INTERNAL MEDICINE

## 2018-10-01 PROCEDURE — 3017F COLORECTAL CA SCREEN DOC REV: CPT | Performed by: INTERNAL MEDICINE

## 2018-10-01 PROCEDURE — G8417 CALC BMI ABV UP PARAM F/U: HCPCS | Performed by: INTERNAL MEDICINE

## 2018-10-01 RX ORDER — ATORVASTATIN CALCIUM 20 MG/1
20 TABLET, FILM COATED ORAL DAILY
Qty: 90 TABLET | Refills: 2 | Status: SHIPPED | OUTPATIENT
Start: 2018-10-01 | End: 2019-01-07 | Stop reason: SDUPTHER

## 2018-10-01 ASSESSMENT — PATIENT HEALTH QUESTIONNAIRE - PHQ9
SUM OF ALL RESPONSES TO PHQ9 QUESTIONS 1 & 2: 0
SUM OF ALL RESPONSES TO PHQ QUESTIONS 1-9: 0
1. LITTLE INTEREST OR PLEASURE IN DOING THINGS: 0
2. FEELING DOWN, DEPRESSED OR HOPELESS: 0
SUM OF ALL RESPONSES TO PHQ QUESTIONS 1-9: 0

## 2018-10-01 ASSESSMENT — ENCOUNTER SYMPTOMS: COUGH: 0

## 2018-10-01 NOTE — PROGRESS NOTES
07/24/2017       Chemistry        Component Value Date/Time     06/15/2018 1328    K 4.7 06/15/2018 1328    CL 97 (L) 06/15/2018 1328    CO2 29 06/15/2018 1328    BUN 13 06/15/2018 1328    CREATININE 0.8 06/15/2018 1328        Component Value Date/Time    CALCIUM 9.4 06/15/2018 1328    ALKPHOS 81 06/15/2018 1328    AST 17 06/15/2018 1328    ALT 16 06/15/2018 1328    BILITOT 0.8 06/15/2018 1328          Review of Systems   Constitutional: Negative for activity change. HENT: Negative for congestion. Respiratory: Negative for cough. Cardiovascular: Negative for chest pain. Endocrine: Negative. Genitourinary: Negative. Musculoskeletal: Positive for arthralgias. Allergic/Immunologic: Positive for environmental allergies. Psychiatric/Behavioral: Positive for sleep disturbance. Prior to Visit Medications    Medication Sig Taking? Authorizing Provider   amLODIPine (NORVASC) 10 MG tablet TAKE ONE TABLET BY MOUTH DAILY Yes Monse Dyson MD   atorvastatin (LIPITOR) 20 MG tablet TAKE 1 TABLET BY MOUTH ONE TIME A DAY  Yes Monse Dyson MD   omeprazole (PRILOSEC) 20 MG delayed release capsule Take 20 mg by mouth daily Yes Historical Provider, MD        Social History   Substance Use Topics    Smoking status: Never Smoker    Smokeless tobacco: Never Used    Alcohol use No        Vitals:    10/01/18 1639 10/01/18 1642   BP: (!) 130/96 (!) 130/96   Site: Right Upper Arm Left Upper Arm   Position: Sitting Sitting   Cuff Size: Medium Adult Medium Adult   Pulse: 92    SpO2: 96%    Weight: 185 lb (83.9 kg)    Height: 5' 6\" (1.676 m)      Estimated body mass index is 29.86 kg/m² as calculated from the following:    Height as of this encounter: 5' 6\" (1.676 m). Weight as of this encounter: 185 lb (83.9 kg). Physical Exam   Constitutional: He appears well-developed and well-nourished. HENT:   Head: Normocephalic. Neck: Normal range of motion. Neck supple.    Cardiovascular: Normal rate

## 2019-01-07 ENCOUNTER — OFFICE VISIT (OUTPATIENT)
Dept: FAMILY MEDICINE CLINIC | Age: 64
End: 2019-01-07
Payer: COMMERCIAL

## 2019-01-07 VITALS
DIASTOLIC BLOOD PRESSURE: 80 MMHG | BODY MASS INDEX: 28.77 KG/M2 | OXYGEN SATURATION: 97 % | HEART RATE: 90 BPM | SYSTOLIC BLOOD PRESSURE: 136 MMHG | WEIGHT: 179 LBS | HEIGHT: 66 IN

## 2019-01-07 DIAGNOSIS — K21.9 GASTROESOPHAGEAL REFLUX DISEASE WITHOUT ESOPHAGITIS: ICD-10-CM

## 2019-01-07 DIAGNOSIS — R63.4 WEIGHT LOSS, UNINTENTIONAL: ICD-10-CM

## 2019-01-07 DIAGNOSIS — I10 ESSENTIAL HYPERTENSION: Primary | ICD-10-CM

## 2019-01-07 DIAGNOSIS — Z12.11 SCREENING FOR COLON CANCER: ICD-10-CM

## 2019-01-07 DIAGNOSIS — E78.00 PURE HYPERCHOLESTEROLEMIA: ICD-10-CM

## 2019-01-07 PROCEDURE — 99214 OFFICE O/P EST MOD 30 MIN: CPT | Performed by: INTERNAL MEDICINE

## 2019-01-07 PROCEDURE — G8427 DOCREV CUR MEDS BY ELIG CLIN: HCPCS | Performed by: INTERNAL MEDICINE

## 2019-01-07 PROCEDURE — G8484 FLU IMMUNIZE NO ADMIN: HCPCS | Performed by: INTERNAL MEDICINE

## 2019-01-07 PROCEDURE — G8417 CALC BMI ABV UP PARAM F/U: HCPCS | Performed by: INTERNAL MEDICINE

## 2019-01-07 PROCEDURE — 3017F COLORECTAL CA SCREEN DOC REV: CPT | Performed by: INTERNAL MEDICINE

## 2019-01-07 PROCEDURE — 1036F TOBACCO NON-USER: CPT | Performed by: INTERNAL MEDICINE

## 2019-01-07 RX ORDER — ATORVASTATIN CALCIUM 20 MG/1
20 TABLET, FILM COATED ORAL DAILY
Qty: 90 TABLET | Refills: 2 | Status: SHIPPED | OUTPATIENT
Start: 2019-01-07 | End: 2019-08-05 | Stop reason: SDUPTHER

## 2019-01-07 RX ORDER — AMLODIPINE BESYLATE 10 MG/1
TABLET ORAL
Qty: 90 TABLET | Refills: 1 | Status: SHIPPED | OUTPATIENT
Start: 2019-01-07 | End: 2019-07-22 | Stop reason: SDUPTHER

## 2019-01-12 ASSESSMENT — ENCOUNTER SYMPTOMS: COUGH: 0

## 2019-07-22 DIAGNOSIS — I10 ESSENTIAL HYPERTENSION: ICD-10-CM

## 2019-07-22 RX ORDER — AMLODIPINE BESYLATE 10 MG/1
TABLET ORAL
Qty: 30 TABLET | Refills: 0 | Status: SHIPPED | OUTPATIENT
Start: 2019-07-22 | End: 2019-08-05 | Stop reason: SDUPTHER

## 2019-08-04 ASSESSMENT — ENCOUNTER SYMPTOMS: COUGH: 0

## 2019-08-05 ENCOUNTER — OFFICE VISIT (OUTPATIENT)
Dept: FAMILY MEDICINE CLINIC | Age: 64
End: 2019-08-05
Payer: COMMERCIAL

## 2019-08-05 VITALS
BODY MASS INDEX: 31.02 KG/M2 | OXYGEN SATURATION: 97 % | HEART RATE: 80 BPM | HEIGHT: 66 IN | WEIGHT: 193 LBS | SYSTOLIC BLOOD PRESSURE: 138 MMHG | DIASTOLIC BLOOD PRESSURE: 88 MMHG

## 2019-08-05 DIAGNOSIS — I10 ESSENTIAL HYPERTENSION: Primary | ICD-10-CM

## 2019-08-05 DIAGNOSIS — E78.00 PURE HYPERCHOLESTEROLEMIA: ICD-10-CM

## 2019-08-05 DIAGNOSIS — K21.9 GASTROESOPHAGEAL REFLUX DISEASE WITHOUT ESOPHAGITIS: ICD-10-CM

## 2019-08-05 PROCEDURE — 1036F TOBACCO NON-USER: CPT | Performed by: INTERNAL MEDICINE

## 2019-08-05 PROCEDURE — 99214 OFFICE O/P EST MOD 30 MIN: CPT | Performed by: INTERNAL MEDICINE

## 2019-08-05 PROCEDURE — 3017F COLORECTAL CA SCREEN DOC REV: CPT | Performed by: INTERNAL MEDICINE

## 2019-08-05 PROCEDURE — G8417 CALC BMI ABV UP PARAM F/U: HCPCS | Performed by: INTERNAL MEDICINE

## 2019-08-05 PROCEDURE — G8427 DOCREV CUR MEDS BY ELIG CLIN: HCPCS | Performed by: INTERNAL MEDICINE

## 2019-08-05 RX ORDER — ATORVASTATIN CALCIUM 20 MG/1
20 TABLET, FILM COATED ORAL DAILY
Qty: 90 TABLET | Refills: 1 | Status: SHIPPED | OUTPATIENT
Start: 2019-08-05 | End: 2020-02-11

## 2019-08-05 RX ORDER — AMLODIPINE BESYLATE 10 MG/1
TABLET ORAL
Qty: 90 TABLET | Refills: 1 | Status: SHIPPED | OUTPATIENT
Start: 2019-08-05 | End: 2020-02-17

## 2019-08-05 ASSESSMENT — PATIENT HEALTH QUESTIONNAIRE - PHQ9
2. FEELING DOWN, DEPRESSED OR HOPELESS: 0
SUM OF ALL RESPONSES TO PHQ QUESTIONS 1-9: 0
1. LITTLE INTEREST OR PLEASURE IN DOING THINGS: 0
SUM OF ALL RESPONSES TO PHQ QUESTIONS 1-9: 0
SUM OF ALL RESPONSES TO PHQ9 QUESTIONS 1 & 2: 0

## 2019-08-05 NOTE — PATIENT INSTRUCTIONS
[facility] [address]    [address] [address]    Phone:  [phone]    Fax:  [phone]       Thank You for choosing us for your health care visit. We are glad to serve you and happy to provide you with this summary of your visit. Please help us to ensure we have accurate records. If you find anything that needs to be changed, please let our staff know as soon as possible.          Your Demographic Information     Patient Name Sex Lj Lebron Male 1989       Ethnic Group Patient Race    Not of  or  Origin White      Your Visit Details     Date & Time Provider Department    2017 8:45 AM Dom V[facility]quez [facility]      Your Upcoming Appointment*(Max 10)     Thursday May 25, 2017 10:45 AM CDT   Follow-up Visit with Elle Culp PA-C   [facility] ([facility])    [address] [address] [phone]              Your Vitals Were     BP Height Weight BMI Smoking Status       124/80 6' 2\" (1.88 m) 208 lb (94.3 kg) 26.71 kg/m2 Current Every Day Smoker       Medications Prescribed or Re-Ordered Today     None      Your Current Medications Are        Disp Refills Start End    CREATINE PO        Sig - Route: Take by mouth 2 times daily. - Oral    Class: Historical Med    HYDROcodone-acetaminophen (NORCO) 5-325 MG per tablet 40 tablet 0 2017     Sig - Route: Take 1-2 tablets by mouth every 4 hours as needed for Pain. - Oral    glycopyrrolate (ROBINUL) 1 MG tablet 30 tablet prn 2016     Sig - Route: Take 1 tablet by mouth daily. - Oral    Class: Eprescribe    glycopyrrolate (ROBINUL) 1 MG tablet 30 tablet 0 3/14/2016     Sig - Route: Take 1 tablet by mouth daily. - Oral    Class: Eprescribe    Famotidine (PEPCID AC MAXIMUM STRENGTH) 20 MG Chew Tab 30 tablet 5 3/14/2016     Sig: One tablet  once daily    Class: Eprescribe    ibuprofen (MOTRIN) 800 MG tablet 30 tablet 0 5/5/2015     Sig - Route: Take 1 tablet by mouth every 8 hours as needed for Pain. - Oral    Class: Eprescribe    Famotidine (PEPCID AC PO)        Sig - Route: Take  by mouth daily. Once in the AM - Oral    Class: Historical Med      Allergies     Codeine NAUSEA    Naproxen Other (See Comments)    Gives patient heartburn      Problem List as of 5/4/2017     Tobacco use disorder    Wheezing    Unspecified asthma    Back pain    Multiple lipomas    Gastroesophageal reflux disease without esophagitis      Patient Portal Signup     Manage health care for you and your family anytime, anywhere with the new Surf[employer]chen, your free online resource for quick and easy access to personal health information, scheduling appointments, refilling prescriptions, viewing test results, paying bills and more.  Sign up for a free and secure account. Please follow the instructions below to securely complete your enrollment.     1. Go to [email or web address]  2. Click Sign Up Now   3. Enter the Activation Code when prompted     Activation Code: [email or web address]  Expires: 5/17/2017  9:54 AM    If you have questions related to [facility], you can email [email or web address] or call [phone] to talk to our [facility]ur[facility] staff.  For questions related to your health, contact your physician’s office.  Please remember to dial 911 for medical emergencies.                Patient Instructions    You may receive a survey in the mail regarding your office visit.  It would be greatly appreciated if you would take a few minutes to fill out the survey.  This is of great value to us.  Your response will tell us how well we met your needs.  Your honest evaluation can make us aware of our strengths as well as areas where you believe we might improve.          throughout the day. But if it stays up, you have high blood pressure. Another name for high blood pressure is hypertension. Despite what a lot of people think, high blood pressure usually doesn't cause headaches or make you feel dizzy or lightheaded. It usually has no symptoms. But it does increase your risk of stroke, heart attack, and other problems. You and your doctor will talk about your risks of these problems based on your blood pressure. Your doctor will give you a goal for your blood pressure. Your goal will be based on your health and your age. Lifestyle changes, such as eating healthy and being active, are always important to help lower blood pressure. You might also take medicine to reach your blood pressure goal.  Follow-up care is a key part of your treatment and safety. Be sure to make and go to all appointments, and call your doctor if you are having problems. It's also a good idea to know your test results and keep a list of the medicines you take. How can you care for yourself at home? Medical treatment  · If you stop taking your medicine, your blood pressure will go back up. You may take one or more types of medicine to lower your blood pressure. Be safe with medicines. Take your medicine exactly as prescribed. Call your doctor if you think you are having a problem with your medicine. · Talk to your doctor before you start taking aspirin every day. Aspirin can help certain people lower their risk of a heart attack or stroke. But taking aspirin isn't right for everyone, because it can cause serious bleeding. · See your doctor regularly. You may need to see the doctor more often at first or until your blood pressure comes down. · If you are taking blood pressure medicine, talk to your doctor before you take decongestants or anti-inflammatory medicine, such as ibuprofen. Some of these medicines can raise blood pressure. · Learn how to check your blood pressure at home.   Lifestyle movement in your face, arm, or leg, especially on only one side of your body. ? Sudden vision changes. ? Sudden trouble speaking. ? Sudden confusion or trouble understanding simple statements. ? Sudden problems with walking or balance. ? A sudden, severe headache that is different from past headaches.     · You have severe back or belly pain.    Do not wait until your blood pressure comes down on its own. Get help right away.   Call your doctor now or seek immediate care if:    · Your blood pressure is much higher than normal (such as 180/120 or higher), but you don't have symptoms.     · You think high blood pressure is causing symptoms, such as:  ? Severe headache.  ? Blurry vision.    Watch closely for changes in your health, and be sure to contact your doctor if:    · Your blood pressure measures higher than your doctor recommends at least 2 times. That means the top number is higher or the bottom number is higher, or both.     · You think you may be having side effects from your blood pressure medicine. Where can you learn more? Go to [email or web address]://SocialChoruspeMacoscope.[email or web address]‚Â² Development. org and sign in to your [employer] account. Enter D970 in the [employer] box to learn more about \"High Blood Pressure: Care Instructions. \"     If you do not have an account, please click on the \"Sign Up Now\" link. Current as of: July 22, 2018  Content Version: 12.0  © 5990-6044 Healthwise, Incorporated. Care instructions adapted under license by [facility] ([facility]). If you have questions about a medical condition or this instruction, always ask your healthcare professional. [place] [address] any warranty or liability for your use of this information.

## 2019-08-08 ENCOUNTER — TELEPHONE (OUTPATIENT)
Dept: FAMILY MEDICINE CLINIC | Age: 64
End: 2019-08-08

## 2019-08-08 DIAGNOSIS — Z12.11 ENCOUNTER FOR SCREENING COLONOSCOPY: ICD-10-CM

## 2019-08-08 DIAGNOSIS — Z12.11 ENCOUNTER FOR SCREENING COLONOSCOPY: Primary | ICD-10-CM

## 2019-08-08 LAB
CONTROL: NORMAL
HEMOCCULT STL QL: NORMAL

## 2019-08-08 PROCEDURE — 82274 ASSAY TEST FOR BLOOD FECAL: CPT | Performed by: INTERNAL MEDICINE

## 2019-08-15 DIAGNOSIS — I10 ESSENTIAL HYPERTENSION: ICD-10-CM

## 2019-08-15 RX ORDER — AMLODIPINE BESYLATE 10 MG/1
TABLET ORAL
Qty: 30 TABLET | Refills: 0 | OUTPATIENT
Start: 2019-08-15

## 2019-10-22 ENCOUNTER — TELEPHONE (OUTPATIENT)
Dept: FAMILY MEDICINE CLINIC | Age: 64
End: 2019-10-22

## 2019-10-22 DIAGNOSIS — I10 ESSENTIAL HYPERTENSION: ICD-10-CM

## 2019-10-22 DIAGNOSIS — K21.9 GASTROESOPHAGEAL REFLUX DISEASE WITHOUT ESOPHAGITIS: Primary | ICD-10-CM

## 2019-10-22 DIAGNOSIS — E78.00 PURE HYPERCHOLESTEROLEMIA: ICD-10-CM

## 2019-10-23 ENCOUNTER — OFFICE VISIT (OUTPATIENT)
Dept: FAMILY MEDICINE CLINIC | Age: 64
End: 2019-10-23
Payer: COMMERCIAL

## 2019-10-23 VITALS
SYSTOLIC BLOOD PRESSURE: 144 MMHG | DIASTOLIC BLOOD PRESSURE: 100 MMHG | HEART RATE: 88 BPM | BODY MASS INDEX: 30.86 KG/M2 | WEIGHT: 192 LBS | HEIGHT: 66 IN | OXYGEN SATURATION: 95 %

## 2019-10-23 DIAGNOSIS — E78.00 PURE HYPERCHOLESTEROLEMIA: ICD-10-CM

## 2019-10-23 DIAGNOSIS — K21.9 GASTROESOPHAGEAL REFLUX DISEASE WITHOUT ESOPHAGITIS: ICD-10-CM

## 2019-10-23 DIAGNOSIS — I10 ESSENTIAL HYPERTENSION: Primary | ICD-10-CM

## 2019-10-23 DIAGNOSIS — I10 ESSENTIAL HYPERTENSION: ICD-10-CM

## 2019-10-23 LAB
HCT VFR BLD CALC: 44.1 % (ref 40.5–52.5)
HEMOGLOBIN: 15.3 G/DL (ref 13.5–17.5)
MCH RBC QN AUTO: 31.9 PG (ref 26–34)
MCHC RBC AUTO-ENTMCNC: 34.8 G/DL (ref 31–36)
MCV RBC AUTO: 91.9 FL (ref 80–100)
PDW BLD-RTO: 13.2 % (ref 12.4–15.4)
PLATELET # BLD: 239 K/UL (ref 135–450)
PMV BLD AUTO: 9 FL (ref 5–10.5)
RBC # BLD: 4.8 M/UL (ref 4.2–5.9)
WBC # BLD: 9.1 K/UL (ref 4–11)

## 2019-10-23 PROCEDURE — 3017F COLORECTAL CA SCREEN DOC REV: CPT | Performed by: INTERNAL MEDICINE

## 2019-10-23 PROCEDURE — 90471 IMMUNIZATION ADMIN: CPT | Performed by: INTERNAL MEDICINE

## 2019-10-23 PROCEDURE — 1036F TOBACCO NON-USER: CPT | Performed by: INTERNAL MEDICINE

## 2019-10-23 PROCEDURE — 1123F ACP DISCUSS/DSCN MKR DOCD: CPT | Performed by: INTERNAL MEDICINE

## 2019-10-23 PROCEDURE — 4040F PNEUMOC VAC/ADMIN/RCVD: CPT | Performed by: INTERNAL MEDICINE

## 2019-10-23 PROCEDURE — G8427 DOCREV CUR MEDS BY ELIG CLIN: HCPCS | Performed by: INTERNAL MEDICINE

## 2019-10-23 PROCEDURE — G8417 CALC BMI ABV UP PARAM F/U: HCPCS | Performed by: INTERNAL MEDICINE

## 2019-10-23 PROCEDURE — 99214 OFFICE O/P EST MOD 30 MIN: CPT | Performed by: INTERNAL MEDICINE

## 2019-10-23 PROCEDURE — G8482 FLU IMMUNIZE ORDER/ADMIN: HCPCS | Performed by: INTERNAL MEDICINE

## 2019-10-23 PROCEDURE — 90653 IIV ADJUVANT VACCINE IM: CPT | Performed by: INTERNAL MEDICINE

## 2019-10-24 LAB
A/G RATIO: 2.2 (ref 1.1–2.2)
ALBUMIN SERPL-MCNC: 5.3 G/DL (ref 3.4–5)
ALP BLD-CCNC: 90 U/L (ref 40–129)
ALT SERPL-CCNC: 31 U/L (ref 10–40)
ANION GAP SERPL CALCULATED.3IONS-SCNC: 16 MMOL/L (ref 3–16)
AST SERPL-CCNC: 30 U/L (ref 15–37)
BILIRUB SERPL-MCNC: 0.9 MG/DL (ref 0–1)
BUN BLDV-MCNC: 14 MG/DL (ref 7–20)
CALCIUM SERPL-MCNC: 9.4 MG/DL (ref 8.3–10.6)
CHLORIDE BLD-SCNC: 103 MMOL/L (ref 99–110)
CHOLESTEROL, TOTAL: 151 MG/DL (ref 0–199)
CO2: 23 MMOL/L (ref 21–32)
CREAT SERPL-MCNC: 0.9 MG/DL (ref 0.8–1.3)
GFR AFRICAN AMERICAN: >60
GFR NON-AFRICAN AMERICAN: >60
GLOBULIN: 2.4 G/DL
GLUCOSE BLD-MCNC: 84 MG/DL (ref 70–99)
HDLC SERPL-MCNC: 38 MG/DL (ref 40–60)
LDL CHOLESTEROL CALCULATED: 83 MG/DL
POTASSIUM SERPL-SCNC: 4.1 MMOL/L (ref 3.5–5.1)
SODIUM BLD-SCNC: 142 MMOL/L (ref 136–145)
TOTAL PROTEIN: 7.7 G/DL (ref 6.4–8.2)
TRIGL SERPL-MCNC: 149 MG/DL (ref 0–150)
VLDLC SERPL CALC-MCNC: 30 MG/DL

## 2019-10-29 ASSESSMENT — ENCOUNTER SYMPTOMS: COUGH: 0

## 2020-02-11 NOTE — TELEPHONE ENCOUNTER
Last OV 10/23/19  Future Appointments   Date Time Provider Ace Umaña   3/5/2020  4:00 PM Ida Garcia MD [facility]

## 2020-02-12 RX ORDER — ATORVASTATIN CALCIUM 20 MG/1
TABLET, FILM COATED ORAL
Qty: 30 TABLET | Refills: 0 | Status: SHIPPED | OUTPATIENT
Start: 2020-02-12 | End: 2020-02-17

## 2020-02-17 RX ORDER — ATORVASTATIN CALCIUM 20 MG/1
TABLET, FILM COATED ORAL
Qty: 90 TABLET | Refills: 0 | Status: SHIPPED | OUTPATIENT
Start: 2020-02-17 | End: 2020-03-05 | Stop reason: SDUPTHER

## 2020-02-17 RX ORDER — AMLODIPINE BESYLATE 10 MG/1
TABLET ORAL
Qty: 90 TABLET | Refills: 0 | Status: SHIPPED | OUTPATIENT
Start: 2020-02-17 | End: 2020-03-05 | Stop reason: SDUPTHER

## 2020-02-17 NOTE — TELEPHONE ENCOUNTER
Future Appointments   Date Time Provider Ace Umaña   3/5/2020  4:00 PM MD [facility]   Last ov 10/23/19

## 2020-03-05 ENCOUNTER — OFFICE VISIT (OUTPATIENT)
Dept: FAMILY MEDICINE CLINIC | Age: 65
End: 2020-03-05
Payer: COMMERCIAL

## 2020-03-05 VITALS
OXYGEN SATURATION: 96 % | SYSTOLIC BLOOD PRESSURE: 136 MMHG | DIASTOLIC BLOOD PRESSURE: 88 MMHG | HEIGHT: 66 IN | WEIGHT: 193 LBS | HEART RATE: 83 BPM | BODY MASS INDEX: 31.02 KG/M2

## 2020-03-05 PROCEDURE — 4040F PNEUMOC VAC/ADMIN/RCVD: CPT | Performed by: INTERNAL MEDICINE

## 2020-03-05 PROCEDURE — G8427 DOCREV CUR MEDS BY ELIG CLIN: HCPCS | Performed by: INTERNAL MEDICINE

## 2020-03-05 PROCEDURE — 99214 OFFICE O/P EST MOD 30 MIN: CPT | Performed by: INTERNAL MEDICINE

## 2020-03-05 PROCEDURE — G8482 FLU IMMUNIZE ORDER/ADMIN: HCPCS | Performed by: INTERNAL MEDICINE

## 2020-03-05 PROCEDURE — 1123F ACP DISCUSS/DSCN MKR DOCD: CPT | Performed by: INTERNAL MEDICINE

## 2020-03-05 PROCEDURE — 3017F COLORECTAL CA SCREEN DOC REV: CPT | Performed by: INTERNAL MEDICINE

## 2020-03-05 PROCEDURE — 1036F TOBACCO NON-USER: CPT | Performed by: INTERNAL MEDICINE

## 2020-03-05 PROCEDURE — G8417 CALC BMI ABV UP PARAM F/U: HCPCS | Performed by: INTERNAL MEDICINE

## 2020-03-05 RX ORDER — AMLODIPINE BESYLATE 10 MG/1
TABLET ORAL
Qty: 90 TABLET | Refills: 1 | Status: SHIPPED | OUTPATIENT
Start: 2020-03-05 | End: 2020-09-15 | Stop reason: SDUPTHER

## 2020-03-05 RX ORDER — ATORVASTATIN CALCIUM 20 MG/1
20 TABLET, FILM COATED ORAL DAILY
Qty: 90 TABLET | Refills: 1 | Status: SHIPPED | OUTPATIENT
Start: 2020-03-05 | End: 2020-09-15 | Stop reason: SDUPTHER

## 2020-03-05 ASSESSMENT — PATIENT HEALTH QUESTIONNAIRE - PHQ9
1. LITTLE INTEREST OR PLEASURE IN DOING THINGS: 0
SUM OF ALL RESPONSES TO PHQ QUESTIONS 1-9: 0
SUM OF ALL RESPONSES TO PHQ QUESTIONS 1-9: 0
2. FEELING DOWN, DEPRESSED OR HOPELESS: 0
SUM OF ALL RESPONSES TO PHQ9 QUESTIONS 1 & 2: 0

## 2020-03-05 ASSESSMENT — ENCOUNTER SYMPTOMS: COUGH: 0

## 2020-03-05 NOTE — PROGRESS NOTES
20    Shelly Peguero II (: 1955) is a 71 y.o. male, here for evaluation of the following medical concerns:      HPI;  GI Disorder:  Patient presents for follow-up of GERD- chronic. Current symptoms include none. Symptoms are unchanged since last visit. Patient denies any other symptoms. Current treatment includes:omeprazole   Medication side effects:  none. Recent diagnostic testing: none.     Treatment Adherence:   Medication compliance:  compliant most of the time  Diet compliance:  compliant most of the time  Weight trend: stable  Current exercise: no regular exercise  Barriers: none     Hypertension:  Home blood pressure monitoring: No. Patient denies chest pain. Antihypertensive medication side effects: no medication side effects noted. Use of agents associated with hypertension: none    HYperlipidemia: on atorvastatin  20 mg       Chemistry        Component Value Date/Time     10/23/2019 1613    K 4.1 10/23/2019 1613     10/23/2019 1613    CO2 23 10/23/2019 1613    BUN 14 10/23/2019 1613    CREATININE 0.9 10/23/2019 1613        Component Value Date/Time    CALCIUM 9.4 10/23/2019 1613    ALKPHOS 90 10/23/2019 1613    AST 30 10/23/2019 1613    ALT 31 10/23/2019 1613    BILITOT 0.9 10/23/2019 1613        Lab Results   Component Value Date    CHOL 151 10/23/2019    CHOL 152 06/15/2018    CHOL 186 2017     Lab Results   Component Value Date    TRIG 149 10/23/2019    TRIG 116 06/15/2018    TRIG 159 (H) 2017     Lab Results   Component Value Date    HDL 38 (L) 10/23/2019    HDL 43 06/15/2018    HDL 41 2017     Lab Results   Component Value Date    LDLCALC 83 10/23/2019    LDLCALC 86 06/15/2018    LDLCALC 113 (H) 2017       Review of Systems   Constitutional: Negative for activity change. HENT: Negative for congestion. Respiratory: Negative for cough. Cardiovascular: Negative for chest pain. Gastrointestinal:        Qi Calderon   Endocrine: Negative.

## 2020-08-09 RX ORDER — ZOSTER VACCINE RECOMBINANT, ADJUVANTED 50 MCG/0.5
0.5 KIT INTRAMUSCULAR SEE ADMIN INSTRUCTIONS
Qty: 0.5 ML | Refills: 0 | Status: CANCELLED | OUTPATIENT
Start: 2020-08-09 | End: 2021-02-05

## 2020-08-09 ASSESSMENT — ENCOUNTER SYMPTOMS: COUGH: 0

## 2020-08-11 ENCOUNTER — TELEPHONE (OUTPATIENT)
Dept: FAMILY MEDICINE CLINIC | Age: 65
End: 2020-08-11

## 2020-08-11 ENCOUNTER — VIRTUAL VISIT (OUTPATIENT)
Dept: FAMILY MEDICINE CLINIC | Age: 65
End: 2020-08-11
Payer: COMMERCIAL

## 2020-08-11 PROCEDURE — 99214 OFFICE O/P EST MOD 30 MIN: CPT | Performed by: INTERNAL MEDICINE

## 2020-08-11 RX ORDER — ZOSTER VACCINE RECOMBINANT, ADJUVANTED 50 MCG/0.5
0.5 KIT INTRAMUSCULAR SEE ADMIN INSTRUCTIONS
Qty: 0.5 ML | Refills: 0 | Status: SHIPPED | OUTPATIENT
Start: 2020-08-11 | End: 2021-02-07

## 2020-08-17 ENCOUNTER — NURSE TRIAGE (OUTPATIENT)
Dept: OTHER | Facility: CLINIC | Age: 65
End: 2020-08-17

## 2020-08-17 ENCOUNTER — TELEPHONE (OUTPATIENT)
Dept: FAMILY MEDICINE CLINIC | Age: 65
End: 2020-08-17

## 2020-08-17 NOTE — TELEPHONE ENCOUNTER
Reason for Disposition   Patient wants to be seen    Answer Assessment - Initial Assessment Questions  1. LOCATION: \"Where does it hurt? \"       Sinus pressure  2. ONSET: \"When did the sinus pain start? \"  (e.g., hours, days)   Started Friday but worsened overnight due to coughing and congestion  3. SEVERITY: \"How bad is the pain? \"   (Scale 1-10; mild, moderate or severe)    - MILD (1-3): doesn't interfere with normal activities     - MODERATE (4-7): interferes with normal activities (e.g., work or school) or awakens from sleep    - SEVERE (8-10): excruciating pain and patient unable to do any normal activities       4. RECURRENT SYMPTOM: \"Have you ever had sinus problems before? \" If so, ask: \"When was the last time? \" and \"What happened that time?\"     5. NASAL CONGESTION: \"Is the nose blocked? \" If so, ask, \"Can you open it or must you breathe through the mouth? \"  Lots of nasal congestion  6. NASAL DISCHARGE: \"Do you have discharge from your nose? \" If so ask, \"What color? \"  Some phlegm  7. FEVER: \"Do you have a fever? \" If so, ask: \"What is it, how was it measured, and when did it start? \"   No fever  8. OTHER SYMPTOMS: \"Do you have any other symptoms? \" (e.g., sore throat, cough, earache, difficulty breathing)   cough  9. PREGNANCY: \"Is there any chance you are pregnant? \" \"When was your last menstrual period? \"  na    Protocols used: SINUS PAIN AND CONGESTION-ADULT-OH    Received call from [facility]. Pt calling with sinus pressure and congestion for a few days. Worse last night, couldn't sleep well. Some cough. No fever, no sob. Recommend pt is seen today, call sooner if worsens. Call soft transferred to [address] to schedule appointment. Please do not reply to the triage nurse through this encounter. Any subsequent communication should be directly with the patient.

## 2020-08-17 NOTE — TELEPHONE ENCOUNTER
----- Message from Jose Antonio De La Torre sent at 8/17/2020 12:36 PM EDT -----  Subject: Appointment Request    Reason for Call: Urgent Return from RN Triage    QUESTIONS  Type of Appointment? Established Patient  Reason for appointment request? No appointments available during search  Additional Information for Provider? pt is having head congestion and   sinus issues  ---------------------------------------------------------------------------  --------------  CALL BACK INFO  What is the best way for the office to contact you? OK to leave message on   voicemail  Preferred Call Back Phone Number? [phone]  ---------------------------------------------------------------------------  --------------  SCRIPT ANSWERS  Patient needs to be seen today or tomorrow? Yes  Nurse Name? Quin Aguilera  (Did RN indicate the need for Red Scheduling?)? No  Have you been diagnosed with COVID-19 (Coronavirus)   tested for COVID-19 (Coronavirus)   or told that you are suspected of having COVID-19 (Coronavirus)? No  Have you had a fever or taken medication to treat a fever within the past   3 days? No  Have you had a cough   shortness of breath or flu-like symptoms within the past 3 days? No  Do you currently have flu-like symptoms including fever or chills   cough   shortness of breath   or difficulty breathing   or new loss of taste or smell? No  (Service Expert  click yes below to proceed with [employer] As Usual   Scheduling)?  Yes

## 2020-09-14 ENCOUNTER — TELEPHONE (OUTPATIENT)
Dept: FAMILY MEDICINE CLINIC | Age: 65
End: 2020-09-14

## 2020-09-14 NOTE — TELEPHONE ENCOUNTER
----- Message from Roxanne Birmingham sent at 9/11/2020  3:51 PM EDT -----  Subject: Message to Provider    QUESTIONS  Information for Provider? Pt states that he was inform that he needs   complete lab work when he comes in the office. Pt was not sure of which of   the appt he has to complete the lab work on. Pt is currently scheduled for   an appt on 09/15/20 and 11/11/20. Please advise. Thank you.   ---------------------------------------------------------------------------  --------------  CALL BACK INFO  What is the best way for the office to contact you? OK to leave message on   voicemail  Preferred Call Back Phone Number? [phone]  ---------------------------------------------------------------------------  --------------  SCRIPT ANSWERS  Relationship to Patient?  Self

## 2020-09-15 ENCOUNTER — OFFICE VISIT (OUTPATIENT)
Dept: FAMILY MEDICINE CLINIC | Age: 65
End: 2020-09-15
Payer: COMMERCIAL

## 2020-09-15 VITALS
SYSTOLIC BLOOD PRESSURE: 140 MMHG | WEIGHT: 193 LBS | HEART RATE: 68 BPM | DIASTOLIC BLOOD PRESSURE: 86 MMHG | TEMPERATURE: 97.4 F | OXYGEN SATURATION: 98 % | BODY MASS INDEX: 31.15 KG/M2

## 2020-09-15 LAB
A/G RATIO: 1.5 (ref 1.1–2.2)
ALBUMIN SERPL-MCNC: 4.5 G/DL (ref 3.4–5)
ALP BLD-CCNC: 86 U/L (ref 40–129)
ALT SERPL-CCNC: 21 U/L (ref 10–40)
ANION GAP SERPL CALCULATED.3IONS-SCNC: 10 MMOL/L (ref 3–16)
AST SERPL-CCNC: 20 U/L (ref 15–37)
BILIRUB SERPL-MCNC: 0.7 MG/DL (ref 0–1)
BUN BLDV-MCNC: 16 MG/DL (ref 7–20)
CALCIUM SERPL-MCNC: 9.4 MG/DL (ref 8.3–10.6)
CHLORIDE BLD-SCNC: 105 MMOL/L (ref 99–110)
CHOLESTEROL, TOTAL: 155 MG/DL (ref 0–199)
CO2: 25 MMOL/L (ref 21–32)
CREAT SERPL-MCNC: 0.7 MG/DL (ref 0.8–1.3)
GFR AFRICAN AMERICAN: >60
GFR NON-AFRICAN AMERICAN: >60
GLOBULIN: 3.1 G/DL
GLUCOSE BLD-MCNC: 102 MG/DL (ref 70–99)
HDLC SERPL-MCNC: 37 MG/DL (ref 40–60)
LDL CHOLESTEROL CALCULATED: 88 MG/DL
POTASSIUM SERPL-SCNC: 4.2 MMOL/L (ref 3.5–5.1)
PROSTATE SPECIFIC ANTIGEN: 3.46 NG/ML (ref 0–4)
SODIUM BLD-SCNC: 140 MMOL/L (ref 136–145)
TOTAL PROTEIN: 7.6 G/DL (ref 6.4–8.2)
TRIGL SERPL-MCNC: 150 MG/DL (ref 0–150)
VLDLC SERPL CALC-MCNC: 30 MG/DL

## 2020-09-15 PROCEDURE — 4040F PNEUMOC VAC/ADMIN/RCVD: CPT | Performed by: INTERNAL MEDICINE

## 2020-09-15 PROCEDURE — 1036F TOBACCO NON-USER: CPT | Performed by: INTERNAL MEDICINE

## 2020-09-15 PROCEDURE — 90662 IIV NO PRSV INCREASED AG IM: CPT | Performed by: INTERNAL MEDICINE

## 2020-09-15 PROCEDURE — 1123F ACP DISCUSS/DSCN MKR DOCD: CPT | Performed by: INTERNAL MEDICINE

## 2020-09-15 PROCEDURE — 90471 IMMUNIZATION ADMIN: CPT | Performed by: INTERNAL MEDICINE

## 2020-09-15 PROCEDURE — G8417 CALC BMI ABV UP PARAM F/U: HCPCS | Performed by: INTERNAL MEDICINE

## 2020-09-15 PROCEDURE — 99214 OFFICE O/P EST MOD 30 MIN: CPT | Performed by: INTERNAL MEDICINE

## 2020-09-15 PROCEDURE — 36415 COLL VENOUS BLD VENIPUNCTURE: CPT | Performed by: INTERNAL MEDICINE

## 2020-09-15 PROCEDURE — 3017F COLORECTAL CA SCREEN DOC REV: CPT | Performed by: INTERNAL MEDICINE

## 2020-09-15 PROCEDURE — G8427 DOCREV CUR MEDS BY ELIG CLIN: HCPCS | Performed by: INTERNAL MEDICINE

## 2020-09-15 RX ORDER — AMLODIPINE BESYLATE 10 MG/1
TABLET ORAL
Qty: 90 TABLET | Refills: 1 | Status: SHIPPED | OUTPATIENT
Start: 2020-09-15 | End: 2021-06-08

## 2020-09-15 RX ORDER — ATORVASTATIN CALCIUM 20 MG/1
20 TABLET, FILM COATED ORAL DAILY
Qty: 90 TABLET | Refills: 1 | Status: SHIPPED | OUTPATIENT
Start: 2020-09-15 | End: 2020-09-24

## 2020-09-15 ASSESSMENT — PATIENT HEALTH QUESTIONNAIRE - PHQ9
SUM OF ALL RESPONSES TO PHQ QUESTIONS 1-9: 0
SUM OF ALL RESPONSES TO PHQ QUESTIONS 1-9: 0
SUM OF ALL RESPONSES TO PHQ9 QUESTIONS 1 & 2: 0
1. LITTLE INTEREST OR PLEASURE IN DOING THINGS: 0
2. FEELING DOWN, DEPRESSED OR HOPELESS: 0

## 2020-09-15 NOTE — PROGRESS NOTES
Subjective:      Patient ID: Fabiola Rivera is a 71 y.o. male. HPI  GI Disorder:  Patient presents for follow-up of GERD- chronic.  Current symptoms include none.  Symptoms are unchanged since last visit. aKrly Fowler denies any other symptoms.  Current treatment includes:omeprazole   Medication side effects:  none.  Recent diagnostic testing: none.     Treatment Adherence:   Medication compliance:  compliant most of the time  Diet compliance:  compliant most of the time  Weight trend: stable  Current exercise: no regular exercise  Barriers: none     Hypertension:  Home blood pressure monitoring: No. Patient denies chest pain.  Antihypertensive medication side effects: no medication side effects noted.  Use of agents associated with hypertension: none      Chemistry        Component Value Date/Time     09/15/2020 1149    K 4.2 09/15/2020 1149     09/15/2020 1149    CO2 25 09/15/2020 1149    BUN 16 09/15/2020 1149    CREATININE 0.7 (L) 09/15/2020 1149        Component Value Date/Time    CALCIUM 9.4 09/15/2020 1149    ALKPHOS 86 09/15/2020 1149    AST 20 09/15/2020 1149    ALT 21 09/15/2020 1149    BILITOT 0.7 09/15/2020 1149        HYperlipidemia: on atorvastatin  20 mg    Lab Results   Component Value Date    CHOL 155 09/15/2020    CHOL 151 10/23/2019    CHOL 152 06/15/2018     Lab Results   Component Value Date    TRIG 150 09/15/2020    TRIG 149 10/23/2019    TRIG 116 06/15/2018     Lab Results   Component Value Date    HDL 37 (L) 09/15/2020    HDL 38 (L) 10/23/2019    HDL 43 06/15/2018     Lab Results   Component Value Date    LDLCALC 88 09/15/2020    LDLCALC 83 10/23/2019    LDLCALC 86 06/15/2018       Review of Systems   Constitutional: Negative for activity change. HENT: Negative for congestion. Respiratory: Negative for cough. Cardiovascular: Negative for chest pain. Gastrointestinal:        Mariea Hamman   Endocrine: Negative. Genitourinary: Negative.     Musculoskeletal: Positive for arthralgias. Allergic/Immunologic: Negative for environmental allergies. Psychiatric/Behavioral: Negative for sleep disturbance. Patient Active Problem List   Diagnosis    Hypertension    Hyperlipidemia    Insomnia    Acute sinusitis    Allergic rhinitis    Scrotal hernia    Recurrent inguinal hernia of left side without obstruction or gangrene       No outpatient medications have been marked as taking for the 9/15/20 encounter (Office Visit) with Gladis Armenta MD.       Allergies   Allergen Reactions    Guaifenesin Er Other (See Comments)     \"made my cough worse and fluid built up in my lungs\"    Z-Vik [Azithromycin] Other (See Comments)     Closes up sinuses    Erythromycin Nausea And Vomiting    Vicodin [Hydrocodone-Acetaminophen] Nausea And Vomiting       Social History     Tobacco Use    Smoking status: Never Smoker    Smokeless tobacco: Never Used   Substance Use Topics    Alcohol use: No       Objective:   BP (!) 140/86   Pulse 68   Temp 97.4 °F (36.3 °C) (Temporal)   Wt 193 lb (87.5 kg)   SpO2 98%   BMI 31.15 kg/m²     Physical Exam  Vitals signs and nursing note reviewed. Constitutional:       Appearance: He is well-developed. HENT:      Head: Normocephalic. Nose: Nose normal.   Neck:      Musculoskeletal: Normal range of motion and neck supple. Cardiovascular:      Rate and Rhythm: Normal rate and regular rhythm. Pulmonary:      Effort: Pulmonary effort is normal.      Breath sounds: Normal breath sounds. Abdominal:      Palpations: Abdomen is soft. Musculoskeletal: Normal range of motion. Skin:     General: Skin is warm and dry. Neurological:      General: No focal deficit present. Psychiatric:         Mood and Affect: Mood normal.         Behavior: Behavior normal.       Assessment:/plan:     1. Pure hypercholesterolemia    - atorvastatin (LIPITOR) 20 MG tablet; Take 1 tablet by mouth daily  Dispense: 90 tablet;  Refill: 1  - COMPREHENSIVE METABOLIC PANEL;   - Lipid Panel  - COMPREHENSIVE METABOLIC PANEL    2. Essential hypertension    - amLODIPine (NORVASC) 10 MG tablet; 1 TAB DAILY FOR HYPERTENSION  Dispense: 90 tablet; Refill: 1  - COMPREHENSIVE METABOLIC PANEL;   - Lipid Panel      3. Flu vaccine need    - INFLUENZA, HIGH-DOSE, QUADV, 65 YRS +, IM, PF, 0.7ML (FLUZONE)    4. Screening for prostate cancer    - Psa screening; Future      5.  Gastroesophageal reflux disease without esophagitis  -on omeprazole            Joy Lester MD

## 2020-09-19 ASSESSMENT — ENCOUNTER SYMPTOMS: COUGH: 0

## 2020-09-23 NOTE — TELEPHONE ENCOUNTER
Future Appointments   Date Time Provider Ace Umaña   11/11/2020  3:20 PM Linda Rock MD [facility]

## 2020-09-24 RX ORDER — ATORVASTATIN CALCIUM 20 MG/1
TABLET, FILM COATED ORAL
Qty: 90 TABLET | Refills: 0 | Status: SHIPPED | OUTPATIENT
Start: 2020-09-24 | End: 2020-12-24

## 2020-11-11 ENCOUNTER — OFFICE VISIT (OUTPATIENT)
Dept: FAMILY MEDICINE CLINIC | Age: 65
End: 2020-11-11
Payer: COMMERCIAL

## 2020-11-11 VITALS
HEIGHT: 66 IN | SYSTOLIC BLOOD PRESSURE: 132 MMHG | WEIGHT: 201 LBS | TEMPERATURE: 98.8 F | OXYGEN SATURATION: 95 % | DIASTOLIC BLOOD PRESSURE: 84 MMHG | RESPIRATION RATE: 18 BRPM | BODY MASS INDEX: 32.3 KG/M2 | HEART RATE: 74 BPM

## 2020-11-11 PROCEDURE — G8484 FLU IMMUNIZE NO ADMIN: HCPCS | Performed by: INTERNAL MEDICINE

## 2020-11-11 PROCEDURE — 99397 PER PM REEVAL EST PAT 65+ YR: CPT | Performed by: INTERNAL MEDICINE

## 2020-11-11 ASSESSMENT — PATIENT HEALTH QUESTIONNAIRE - PHQ9
2. FEELING DOWN, DEPRESSED OR HOPELESS: 0
SUM OF ALL RESPONSES TO PHQ QUESTIONS 1-9: 0
SUM OF ALL RESPONSES TO PHQ QUESTIONS 1-9: 0
1. LITTLE INTEREST OR PLEASURE IN DOING THINGS: 0
SUM OF ALL RESPONSES TO PHQ QUESTIONS 1-9: 0
SUM OF ALL RESPONSES TO PHQ9 QUESTIONS 1 & 2: 0

## 2020-11-11 NOTE — PROGRESS NOTES
History and Physical      Heydi Lara II  YOB: 1955    Date of Service:  11/11/2020    Chief Complaint:   Perlita Robertson is a 76 y.o. male who presents for complete physical examination.     HPI: Annual Physical           Essential hypertemsion           Hyperlipidemia    Wt Readings from Last 3 Encounters:   11/11/20 201 lb (91.2 kg)   09/15/20 193 lb (87.5 kg)   03/05/20 193 lb (87.5 kg)     BP Readings from Last 3 Encounters:   11/11/20 132/84   09/15/20 (!) 140/86   03/05/20 136/88       Patient Active Problem List   Diagnosis    Hypertension    Hyperlipidemia    Insomnia    Acute sinusitis    Allergic rhinitis    Scrotal hernia    Recurrent inguinal hernia of left side without obstruction or gangrene       Preventive Care:  Health Maintenance   Topic Date Due    Diabetes screen  01/25/2019    Pneumococcal 65+ years Vaccine (1 of 1 - PPSV23) 10/17/2019    Colon Cancer Screen FIT/FOBT  08/08/2020    Shingles Vaccine (1 of 2) 09/15/2021 (Originally 10/17/2004)    Lipid screen  09/15/2021    DTaP/Tdap/Td vaccine (2 - Td) 08/25/2026    Flu vaccine  Completed    Hepatitis C screen  Addressed    Hepatitis A vaccine  Aged Out    Hepatitis B vaccine  Aged Out    Hib vaccine  Aged Out    Meningococcal (ACWY) vaccine  Aged Out      Self-testicular exams: Yes  Sexual activity: single partner, contraception - none   Last eye exam: none recently   Exercise: no regular exercise  Seatbelt use: yes  Lipid panel:    Lab Results   Component Value Date    CHOL 155 09/15/2020    TRIG 150 09/15/2020    HDL 37 (L) 09/15/2020    LDLCALC 88 09/15/2020    LDLDIRECT 106 (H) 10/22/2015       Living will: unknown     Immunization History   Administered Date(s) Administered    Influenza 10/22/2010, 11/13/2012    Influenza Virus Vaccine 10/14/2011, 10/22/2015    Influenza, High-dose, Quadv, 65 yrs +, IM (Fluzone) 09/15/2020    Influenza, Quadv, IM, (6 mo and older Fluzone, Flulaval, Fluarix and 3 yrs and older Afluria) 11/10/2016, 09/19/2017    Influenza, Triv, inactivated, subunit, adjuvanted, IM (Fluad 65 yrs and older) 10/23/2019    Tdap (Boostrix, Adacel) 08/25/2016       Allergies   Allergen Reactions    Guaifenesin Er Other (See Comments)     \"made my cough worse and fluid built up in my lungs\"    Z-Vik [Azithromycin] Other (See Comments)     Closes up sinuses    Erythromycin Nausea And Vomiting    Vicodin [Hydrocodone-Acetaminophen] Nausea And Vomiting     Outpatient Medications Marked as Taking for the 11/11/20 encounter (Office Visit) with Nate Avery MD   Medication Sig Dispense Refill    atorvastatin (LIPITOR) 20 MG tablet TAKE ONE TABLET BY MOUTH DAILY 90 tablet 0    amLODIPine (NORVASC) 10 MG tablet 1 TAB DAILY FOR HYPERTENSION 90 tablet 1    omeprazole (PRILOSEC) 20 MG delayed release capsule Take 20 mg by mouth daily         Past Medical History:   Diagnosis Date    Hyperlipidemia     Hypertension     Recurrent left inguinal hernia     S/P colonoscopy 8/1/2008    Scrotal hernia      Past Surgical History:   Procedure Laterality Date    COLONOSCOPY      ENDOSCOPY, COLON, DIAGNOSTIC      HERNIA REPAIR Left 09/27/2017    ATTEMPTED DAVINCI, CONVERTED OPEN RECURRENT LEFT INGUINAL hernia repair with mesh    INGUINAL HERNIA REPAIR  08/09/2017    DAVINCI LEFT INGUINAL HERNIA REPAIR WITH MESH    INGUINAL HERNIA REPAIR  08/09/2017    DAVINCI LEFT INGUINAL HERNIA REPAIR WITH MESH    TONSILLECTOMY       Family History   Problem Relation Age of Onset    Cancer Mother     High Blood Pressure Mother     High Blood Pressure Sister     High Cholesterol Sister      Social History     Socioeconomic History    Marital status:       Spouse name: Not on file    Number of children: Not on file    Years of education: Not on file    Highest education level: Not on file   Occupational History    Not on file   Social Needs    Financial resource strain: Not on file   Elvin-Andrew insecurity     Worry: Not on file     Inability: Not on file    Transportation needs     Medical: Not on file     Non-medical: Not on file   Tobacco Use    Smoking status: Never Smoker    Smokeless tobacco: Never Used   Substance and Sexual Activity    Alcohol use: No    Drug use: No    Sexual activity: Not on file   Lifestyle    Physical activity     Days per week: Not on file     Minutes per session: Not on file    Stress: Not on file   Relationships    Social connections     Talks on phone: Not on file     Gets together: Not on file     Attends Church service: Not on file     Active member of club or organization: Not on file     Attends meetings of clubs or organizations: Not on file     Relationship status: Not on file    Intimate partner violence     Fear of current or ex partner: Not on file     Emotionally abused: Not on file     Physically abused: Not on file     Forced sexual activity: Not on file   Other Topics Concern    Not on file   Social History Narrative    Not on file       Review of Systems:  A comprehensive review of systems was negative except for what was noted in the HPI. Physical Exam:   Vitals:    11/11/20 1523   BP: 132/84   Site: Right Upper Arm   Position: Sitting   Cuff Size: Large Adult   Pulse: 74   Resp: 18   Temp: 98.8 °F (37.1 °C)   TempSrc: Temporal   SpO2: 95%   Weight: 201 lb (91.2 kg)   Height: 5' 5.5\" (1.664 m)     Body mass index is 32.94 kg/m². Constitutional: He is oriented to person, place, and time. He appears well-developed and well-nourished. No distress. HEENT:   Head: Normocephalic and atraumatic. Right Ear: Tympanic membrane, external ear and ear canal normal.   Left Ear: Tympanic membrane, external ear and ear canal normal.   Nose: Nose normal.   Mouth/Throat: Oropharynx is clear and moist and mucous membranes are normal. No oropharyngeal exudate or posterior oropharyngeal erythema. He has no cervical adenopathy.    Eyes: Conjunctivae and

## 2021-06-21 ENCOUNTER — TELEPHONE (OUTPATIENT)
Dept: FAMILY MEDICINE CLINIC | Age: 66
End: 2021-06-21

## 2021-06-21 DIAGNOSIS — I10 ESSENTIAL HYPERTENSION: ICD-10-CM

## 2021-06-21 RX ORDER — AMLODIPINE BESYLATE 10 MG/1
TABLET ORAL
Qty: 30 TABLET | Refills: 0 | Status: SHIPPED | OUTPATIENT
Start: 2021-06-21 | End: 2021-07-22 | Stop reason: SDUPTHER

## 2021-06-21 RX ORDER — AMLODIPINE BESYLATE 10 MG/1
TABLET ORAL
Qty: 90 TABLET | Refills: 0 | OUTPATIENT
Start: 2021-06-21

## 2021-06-21 NOTE — TELEPHONE ENCOUNTER
Future Appointments   Date Time Provider Ace Umaña   7/19/2021  3:40 PM MD [facility] - [facility] 11/11/2020

## 2021-06-21 NOTE — TELEPHONE ENCOUNTER
----- Message from Akila Hughesks sent at 6/21/2021  8:44 AM EDT -----  Subject: Refill Request    QUESTIONS  Name of Medication? amLODIPine (NORVASC) 10 MG tablet  Patient-reported dosage and instructions? n/a  How many days do you have left? 1  [id number]  Pharmacy phone number (if available)? [phone]  Additional Information for Provider? pt is going to be paying out of   pocket because his insurance does not go into affect until july 1st pt is   out of meds and wants to know if u can give him a fill until july 1st so   its not so expensive.   ---------------------------------------------------------------------------  --------------  CALL BACK INFO  What is the best way for the office to contact you? OK to leave message on   voicemail  Preferred Call Back Phone Number?  [phone]

## 2021-06-21 NOTE — TELEPHONE ENCOUNTER
Patient needs a refill on amLODIPine (NORVASC) 10 MG tablet    They need a 2 weeks day supply.          [facility]   Patient changing insurance July 1st so only needs 2 weeks now mail order pharmacy with new insurance will be reaching out

## 2021-07-19 ENCOUNTER — OFFICE VISIT (OUTPATIENT)
Dept: FAMILY MEDICINE CLINIC | Age: 66
End: 2021-07-19
Payer: MEDICARE

## 2021-07-19 VITALS
RESPIRATION RATE: 16 BRPM | HEART RATE: 83 BPM | TEMPERATURE: 97.1 F | HEIGHT: 65 IN | BODY MASS INDEX: 34.41 KG/M2 | WEIGHT: 206.5 LBS | OXYGEN SATURATION: 97 % | SYSTOLIC BLOOD PRESSURE: 152 MMHG | DIASTOLIC BLOOD PRESSURE: 80 MMHG

## 2021-07-19 DIAGNOSIS — Z13.6 SCREENING FOR ABDOMINAL AORTIC ANEURYSM: ICD-10-CM

## 2021-07-19 DIAGNOSIS — Z12.11 ENCOUNTER FOR FIT (FECAL IMMUNOCHEMICAL TEST) SCREENING: ICD-10-CM

## 2021-07-19 DIAGNOSIS — Z00.00 ROUTINE GENERAL MEDICAL EXAMINATION AT A HEALTH CARE FACILITY: Primary | ICD-10-CM

## 2021-07-19 DIAGNOSIS — I10 ESSENTIAL HYPERTENSION: ICD-10-CM

## 2021-07-19 DIAGNOSIS — Z13.1 ENCOUNTER FOR SCREENING FOR DIABETES MELLITUS: ICD-10-CM

## 2021-07-19 DIAGNOSIS — E78.00 PURE HYPERCHOLESTEROLEMIA: ICD-10-CM

## 2021-07-19 PROCEDURE — G0438 PPPS, INITIAL VISIT: HCPCS | Performed by: INTERNAL MEDICINE

## 2021-07-19 PROCEDURE — 4040F PNEUMOC VAC/ADMIN/RCVD: CPT | Performed by: INTERNAL MEDICINE

## 2021-07-19 PROCEDURE — 1123F ACP DISCUSS/DSCN MKR DOCD: CPT | Performed by: INTERNAL MEDICINE

## 2021-07-19 PROCEDURE — 3017F COLORECTAL CA SCREEN DOC REV: CPT | Performed by: INTERNAL MEDICINE

## 2021-07-19 SDOH — ECONOMIC STABILITY: FOOD INSECURITY: WITHIN THE PAST 12 MONTHS, THE FOOD YOU BOUGHT JUST DIDN'T LAST AND YOU DIDN'T HAVE MONEY TO GET MORE.: NEVER TRUE

## 2021-07-19 SDOH — ECONOMIC STABILITY: FOOD INSECURITY: WITHIN THE PAST 12 MONTHS, YOU WORRIED THAT YOUR FOOD WOULD RUN OUT BEFORE YOU GOT MONEY TO BUY MORE.: NEVER TRUE

## 2021-07-19 ASSESSMENT — PATIENT HEALTH QUESTIONNAIRE - PHQ9
SUM OF ALL RESPONSES TO PHQ QUESTIONS 1-9: 0
SUM OF ALL RESPONSES TO PHQ9 QUESTIONS 1 & 2: 0
2. FEELING DOWN, DEPRESSED OR HOPELESS: 0
1. LITTLE INTEREST OR PLEASURE IN DOING THINGS: 0
SUM OF ALL RESPONSES TO PHQ QUESTIONS 1-9: 0
SUM OF ALL RESPONSES TO PHQ QUESTIONS 1-9: 0

## 2021-07-19 ASSESSMENT — SOCIAL DETERMINANTS OF HEALTH (SDOH): HOW HARD IS IT FOR YOU TO PAY FOR THE VERY BASICS LIKE FOOD, HOUSING, MEDICAL CARE, AND HEATING?: NOT HARD AT ALL

## 2021-07-19 NOTE — PATIENT INSTRUCTIONS
Learning About Healthy Weight  What is a healthy weight? A healthy weight is the weight at which you feel good about yourself and have energy for work and play. It's also one that lowers your risk for health problems. What can you do to stay at a healthy weight? It can be hard to stay at a healthy weight, especially when fast food, vending-machine snacks, and processed foods are so easy to find. And with your busy lifestyle, activity may be low on your list of things to do. But staying at a healthy weight may be easier than you think. Here are some dos and don'ts for staying at a healthy weight. Do eat healthy foods  The kinds of foods you eat have a big impact on both your weight and your health. Reaching and staying at a healthy weight is not about going on a diet. It's about making healthier food choices every day and changing your diet for good. Healthy eating means eating a variety of foods so that you get all the nutrients you need. Your body needs protein, carbohydrate, and fats for energy. They keep your heart beating, your brain active, and your muscles working. On most days, try to eat from each food group. This means eating a variety of:  · Whole grains, such as whole wheat breads and pastas. · Fruits and vegetables. · Dairy products, such as low-fat milk, yogurt, and cheese. · Lean proteins, such as all types of fish, chicken without the skin, and beans. Don't have too much or too little of one thing. All foods, if eaten in moderation, can be part of healthy eating. Even sweets can be okay. If your favorite foods are high in fat, salt, sugar, or calories, limit how often you eat them. Eat smaller servings, or look for healthy substitutes. Do watch what you eat  Many people eat more than their bodies need. Part of staying at a healthy weight means learning how much food you really need from day to day and not eating more than that.  Even with healthy foods, eating too much can make you gain weight. Having a well-balanced diet means that you eat enough, but not too much, and that your food gives you the nutrients you need to stay healthy. So listen to your body. Eat when you're hungry. Stop when you feel satisfied. It's a good idea to have healthy snacks ready for when you get hungry. Keep healthy snacks with you at work, in your car, and at home. If you have a healthy snack easily available, you'll be less likely to pick a candy bar or bag of chips from a vending machine instead. Some healthy snacks you might want to keep on hand are fruit, low-fat yogurt, string cheese, low-fat microwave popcorn, raisins and other dried fruit, nuts, whole wheat crackers, pretzels, carrots, celery sticks, and broccoli. Do some physical activity  A big part of reaching and staying at a healthy weight is being active. When you're active, you burn calories. This makes it easier to reach and stay at a healthy weight. When you're active on a regular basis, your body burns more calories, even when you're at rest. Being active helps you lose fat and build lean muscle. Try to be active for at least 1 hour every day. This may sound like a lot, but it's okay to be active in smaller blocks of time that add up to 1 hour a day. Any activity that makes your heart beat faster and keeps it there for a while counts. A brisk walk, run, or swim will get your heart beating faster. So will climbing stairs, shooting baskets, or cycling. Even some household chores like vacuuming and mowing the lawn will get your heart rate up. Pick activities that you enjoy--ones that make your heart beat faster, your muscles stronger, and your muscles and joints more flexible. If you find more than one thing you like doing, do them all. You don't have to do the same thing every day. Don't diet  Diets don't work. Diets are temporary. Because you give up so much when you diet, you may be hungry and think about food all the time.  And after you stop dieting, you also may overeat to make up for what you missed. Most people who diet end up gaining back the pounds they lost--and more. Remember that healthy bodies come in lots of shapes and sizes. Everyone can get healthier by eating better and being more active. Where can you learn more? Go to https://chpepiceweb.Cir[employer]. org and sign in to your IMNEXT account. Enter 431 [id number] in the [employer] box to learn more about \"Learning About Healthy Weight. \"     If you do not have an account, please click on the \"Sign Up Now\" link. Current as of: March 17, 2021               Content Version: 12.9  © 2006-2021 Healthwise, [employer]. Care instructions adapted under license by [facility] ([facility]). If you have questions about a medical condition or this instruction, always ask your healthcare professional. [place] [address] any warranty or liability for your use of this information. Personalized Preventive Plan for Derick Cotto II - 7/19/2021  Medicare offers a range of preventive health benefits. Some of the tests and screenings are paid in full while other may be subject to a deductible, co-insurance, and/or copay. Some of these benefits include a comprehensive review of your medical history including lifestyle, illnesses that may run in your family, and various assessments and screenings as appropriate. After reviewing your medical record and screening and assessments performed today your provider may have ordered immunizations, labs, imaging, and/or referrals for you. A list of these orders (if applicable) as well as your Preventive Care list are included within your After Visit Summary for your review. Other Preventive Recommendations:    · A preventive eye exam performed by an eye specialist is recommended every 1-2 years to screen for glaucoma; cataracts, macular degeneration, and other eye disorders.   · A preventive dental visit is recommended every 6 months. · Try to get at least 150 minutes of exercise per week or 10,000 steps per day on a pedometer . · Order or download the FREE \"Exercise & Physical Activity: Your Everyday Guide\" from The [employer]. Call 3-650.502.2569 or search The [employer] online. · You need 0476-3421 mg of calcium and 8024-5259 IU of vitamin D per day. It is possible to meet your calcium requirement with diet alone, but a vitamin D supplement is usually necessary to meet this goal.  · When exposed to the sun, use a sunscreen that protects against both UVA and UVB radiation with an SPF of 30 or greater. Reapply every 2 to 3 hours or after sweating, drying off with a towel, or swimming. · Always wear a seat belt when traveling in a car. Always wear a helmet when riding a bicycle or motorcycle. Learning About Medical Power of   What is a medical power of ? A medical power of , also called a durable power of  for health care, is one type of the legal forms called advance directives. It lets you name the person you want to make treatment decisions for you if you can't speak or decide for yourself. The person you choose is called your health care agent. This person is also called a health care proxy or health care surrogate. A medical power of  may be called something else in your state. How do you choose a health care agent? Choose your health care agent carefully. This person may or may not be a family member. Talk to the person before you make your final decision. Make sure he or she is comfortable with this responsibility. It's a good idea to choose someone who:  Is at least 25years old. Knows you well and understands what makes life meaningful for you. Understands your [employer] and moral values. Will do what you want, not what he or she wants. Will be able to make difficult choices at a stressful time.   Will be able to refuse or stop treatment, if that is what you would want, even if you could die. Will be firm and confident with health professionals if needed. Will ask questions to get needed information. Lives near you or agrees to travel to you if needed. Your family may help you make medical decisions while you can still be part of that process. But it's important to choose one person to be your health care agent in case you aren't able to make decisions for yourself. If you don't fill out the legal form and name a health care agent, the decisions your family can make may be limited. A health care agent may be called something else in your state. Who will make decisions for you if you don't have a health care agent? If you don't have a health care agent or a living will, you may not get the care you want. Decisions may be made by family members who disagree about your medical care. Or decisions may be made by a medical professional who doesn't know you well. In some cases, a  makes the decisions. When you name a health care agent, it is very clear who has the power to make health decisions for you. How do you name a health care agent? You name your health care agent on a legal form. This form is usually called a medical power of . Ask your hospital, state bar association, or office on aging where to find these forms. You must sign the form to make it legal. Some states require you to get the form notarized. This means that a person called a  watches you sign the form and then he or she signs the form. Some states also require that two or more witnesses sign the form. Be sure to tell your family members and doctors who your health care agent is. Where can you learn more? Go to https://namrata.healthApervita. org and sign in to your [employer] account. Enter [id number] in the [employer] box to learn more about \"Learning About [address]. \"     If you do not have an account, please click on the \"Sign Up Now\" link. Current as of: March 17, 2021               Content Version: 12.9  © 2006-2021 Healthwise, [employer]. Care instructions adapted under license by [facility] ([facility]). If you have questions about a medical condition or this instruction, always ask your healthcare professional. [address] any warranty or liability for your use of this information. ·        Eating Healthy Foods: Care Instructions  Your Care Instructions     Eating healthy foods can help lower your risk for disease. Healthy food gives you energy and keeps your heart strong, your brain active, your muscles working, and your bones strong. A healthy diet includes a variety of foods from the basic food groups: grains, vegetables, fruits, milk and milk products, and meat and beans. Some people may eat more of their favorite foods from only one food group and, as a result, miss getting the nutrients they need. So, it is important to pay attention not only to what you eat but also to what you are missing from your diet. You can eat a healthy, balanced diet by making a few small changes. Follow-up care is a key part of your treatment and safety. Be sure to make and go to all appointments, and call your doctor if you are having problems. It's also a good idea to know your test results and keep a list of the medicines you take. How can you care for yourself at home? Look at what you eat  Keep a food diary for a week or two and record everything you eat or drink. Track the number of servings you eat from each food group. For a balanced diet every day, eat a variety of:  6 or more ounce-equivalents of grains, such as cereals, breads, crackers, rice, or pasta, every day. An ounce-equivalent is 1 slice of bread, 1 cup of ready-to-eat cereal, or ½ cup of cooked rice, cooked pasta, or cooked cereal.  2½ cups of vegetables, especially:  Dark-green vegetables such as broccoli and spinach.   Orange vegetables such as carrots and sweet potatoes. Dry beans (such as lucero and kidney beans) and peas (such as lentils). 2 cups of fresh, frozen, or canned fruit. A small apple or 1 banana or orange equals 1 cup.  3 cups of nonfat or low-fat milk, yogurt, or other milk products. 5½ ounces of meat and beans, such as chicken, fish, lean meat, beans, nuts, and seeds. One egg, 1 tablespoon of peanut butter, ½ ounce nuts or seeds, or ¼ cup of cooked beans equals 1 ounce of meat. Learn how to read food labels for serving sizes and ingredients. Fast-food and convenience-food meals often contain few or no fruits or vegetables. Make sure you eat some fruits and vegetables to make the meal more nutritious. Look at your food diary. For each food group, add up what you have eaten and then divide the total by the number of days. This will give you an idea of how much you are eating from each food group. See if you can find some ways to change your diet to make it more healthy. Start small  Do not try to make dramatic changes to your diet all at once. You might feel that you are missing out on your favorite foods and then be more likely to fail. Start slowly, and gradually change your habits. Try some of the following:  Use whole wheat bread instead of white bread. Use nonfat or low-fat milk instead of whole milk. Eat brown rice instead of white rice, and eat whole wheat pasta instead of white-flour pasta. Try low-fat cheeses and low-fat yogurt. Add more fruits and vegetables to meals and have them for snacks. Add lettuce, tomato, cucumber, and onion to sandwiches. Add fruit to yogurt and cereal.  Enjoy food  You can still eat your favorite foods. You just may need to eat less of them. If your favorite foods are high in fat, salt, and sugar, limit how often you eat them, but do not cut them out entirely. Eat a wide variety of foods.   Make healthy choices when eating out  The type of restaurant you choose can help you make healthy choices. Even fast-food chains are now offering more low-fat or healthier choices on the menu. Choose smaller portions, or take half of your meal home. When eating out, try:  A veggie pizza with a whole wheat crust or grilled chicken (instead of sausage or pepperoni). Pasta with roasted vegetables, grilled chicken, or marinara sauce instead of cream sauce. A vegetable wrap or grilled chicken wrap. Broiled or poached food instead of fried or breaded items. Make healthy choices easy  Buy packaged, prewashed, ready-to-eat fresh vegetables and fruits, such as baby carrots, salad mixes, and chopped or shredded broccoli and cauliflower. Buy packaged, presliced fruits, such as melon or pineapple. Choose 100% fruit or vegetable juice instead of soda. Limit juice intake to 4 to 6 oz (½ to ¾ cup) a day. Blend low-fat yogurt, fruit juice, and canned or frozen fruit to make a smoothie for breakfast or a snack. Where can you learn more? Go to https://OnMyBlockpepiceweb.RightCare Solutions. org and sign in to your [employer] account. Enter W577 in the [facility] box to learn more about \"Eating Healthy Foods: Care Instructions. \"     If you do not have an account, please click on the \"Sign Up Now\" link. Current as of: December 17, 2020               Content Version: 12.9  © 1659-8333 Healthwise, [facility]. Care instructions adapted under license by [facility] ([facility]). If you have questions about a medical condition or this instruction, always ask your healthcare professional. [place] [address] any warranty or liability for your use of this information. ·   Personalized Preventive Plan for Bre Leavitt II - 7/19/2021  Medicare offers a range of preventive health benefits. Some of the tests and screenings are paid in full while other may be subject to a deductible, co-insurance, and/or copay.     Some of these benefits include a comprehensive review of your medical history including lifestyle, illnesses that may run in your family, and various assessments and screenings as appropriate. After reviewing your medical record and screening and assessments performed today your provider may have ordered immunizations, labs, imaging, and/or referrals for you. A list of these orders (if applicable) as well as your Preventive Care list are included within your After Visit Summary for your review. Other Preventive Recommendations:    · A preventive eye exam performed by an eye specialist is recommended every 1-2 years to screen for glaucoma; cataracts, macular degeneration, and other eye disorders. · A preventive dental visit is recommended every 6 months. · Try to get at least 150 minutes of exercise per week or 10,000 steps per day on a pedometer . · Order or download the FREE \"Exercise & Physical Activity: Your Everyday Guide\" from The [employer]. Call 8-470.136.1693 or search The [employer] online. · You need 4575-0321 mg of calcium and 4825-5766 IU of vitamin D per day. It is possible to meet your calcium requirement with diet alone, but a vitamin D supplement is usually necessary to meet this goal.  · When exposed to the sun, use a sunscreen that protects against both UVA and UVB radiation with an SPF of 30 or greater. Reapply every 2 to 3 hours or after sweating, drying off with a towel, or swimming. · Always wear a seat belt when traveling in a car. Always wear a helmet when riding a bicycle or motorcycle.

## 2021-07-19 NOTE — PROGRESS NOTES
Medicare Annual Wellness Visit  Name: Migdalia Sheridan Date: 2021   MRN: <[id number]> Sex: Male   Age: 76 y.o. Ethnicity: Non-/Non    : 1955 Race: Anupam Baumann II is here for No chief complaint on file. Screenings for behavioral, psychosocial and functional/safety risks, and cognitive dysfunction are all negative except as indicated below. These results, as well as other patient data from the [address] form, are documented in Flowsheets linked to this Encounter. Allergies   Allergen Reactions    Guaifenesin Er Other (See Comments)     \"made my cough worse and fluid built up in my lungs\"    Z-Vik [Azithromycin] Other (See Comments)     Closes up sinuses    Erythromycin Nausea And Vomiting    Vicodin [Hydrocodone-Acetaminophen] Nausea And Vomiting       Prior to Visit Medications    Medication Sig Taking?  Authorizing Provider   amLODIPine (NORVASC) 10 MG tablet TAKE ONE TABLET BY MOUTH DAILY FOR HYPERTENSION Yes Bhumika Garcia MD   atorvastatin (LIPITOR) 20 MG tablet TAKE ONE TABLET BY MOUTH DAILY Yes Bhumika Garcia MD   omeprazole (PRILOSEC) 20 MG delayed release capsule Take 20 mg by mouth daily Yes Historical Provider, MD       Past Medical History:   Diagnosis Date    Hyperlipidemia     Hypertension     Recurrent left inguinal hernia     S/P colonoscopy 2008    Scrotal hernia        Past Surgical History:   Procedure Laterality Date    COLONOSCOPY      ENDOSCOPY, COLON, DIAGNOSTIC      HERNIA REPAIR Left 2017    ATTEMPTED DAVINCI, CONVERTED OPEN RECURRENT LEFT INGUINAL hernia repair with mesh    INGUINAL HERNIA REPAIR  2017    DAVINCI LEFT INGUINAL HERNIA REPAIR WITH MESH    INGUINAL HERNIA REPAIR  2017    DAVINCI LEFT INGUINAL HERNIA REPAIR WITH MESH    TONSILLECTOMY         Family History   Problem Relation Age of Onset    Cancer Mother     High Blood Pressure Mother     High Blood Pressure Sister    Benjamin Rush High Cholesterol Sister        Dread (Including outside providers/suppliers regularly involved in providing care):   Patient Care Team:  Patrick Everett MD as PCP - General (Internal Medicine)  Patrick Everett MD as PCP - [facility] Provider    Wt Readings from Last 3 Encounters:   07/19/21 206 lb 8 oz (93.7 kg)   11/11/20 201 lb (91.2 kg)   09/15/20 193 lb (87.5 kg)     Vitals:    07/19/21 1625 07/19/21 1628   BP: (!) 160/80 (!) 152/80   Site: Right Upper Arm Right Upper Arm   Position: Sitting Sitting   Cuff Size: Medium Adult Medium Adult   Pulse: 83    Resp: 16    Temp: 97.1 °F (36.2 °C)    TempSrc: Temporal    SpO2: 97%    Weight: 206 lb 8 oz (93.7 kg)    Height: 5' 5\" (1.651 m)      Body mass index is 34.36 kg/m². Based upon direct observation of the patient, evaluation of cognition reveals recent and remote memory intact. Patient's complete Health Risk Assessment and screening values have been reviewed and are found in Flowsheets. The following problems were reviewed today and where indicated follow up appointments were made and/or referrals ordered.     Positive Risk Factor Screenings with Interventions:          General Health and ACP:     Advance Directives     Power of  Living Will ACP-Advance Directive ACP-Power of     Not on File Not on File Not on File Not on File      General Health Risk Interventions:  · none    Health Habits/Nutrition:     Body mass index: (!) 34.36  Health Habits/Nutrition Interventions:  · none       Personalized Preventive Plan   Current Health Maintenance Status  Immunization History   Administered Date(s) Administered    COVID-19, Garcia Peter, PF, 30mcg/0.3mL 05/15/2021, 06/05/2021    Influenza 10/22/2010, 11/13/2012    Influenza Virus Vaccine 10/14/2011, 10/22/2015    Influenza, High-dose, [place], 65 yrs +, IM (Fluzone) 09/15/2020    Influenza, Quadv, IM, (6 mo and older Fluzone, Flulaval, Fluarix and 3 yrs and older Afluria) 11/10/2016, 09/19/2017  Influenza, Triv, inactivated, subunit, adjuvanted, IM (Fluad 65 yrs and older) 10/23/2019    Tdap (Boostrix, Adacel) 08/25/2016        Health Maintenance   Topic Date Due    Diabetes screen  01/25/2019    Pneumococcal 65+ years Vaccine (1 of 1 - PPSV23) Never done    Colon Cancer Screen FIT/FOBT  08/08/2020    Shingles Vaccine (1 of 2) 09/15/2021 (Originally 10/17/2004)    Flu vaccine (1) 09/01/2021    Lipid screen  09/15/2021    DTaP/Tdap/Td vaccine (2 - Td or Tdap) 08/25/2026    COVID-19 Vaccine  Completed    Hepatitis C screen  Addressed    Hepatitis A vaccine  Aged Out    Hepatitis B vaccine  Aged Out    Hib vaccine  Aged Out    Meningococcal (ACWY) vaccine  Aged Out     Recommendations for Aktino Due: see orders and patient instructions/AVS.  .   Recommended screening schedule for the next 5-10 years is provided to the patient in written form: see Patient Instructions/AVS.    Diagnoses and all orders for this visit:    Encounter for screening for diabetes mellitus    Routine general medical examination at a health care facility

## 2021-07-21 DIAGNOSIS — E78.00 PURE HYPERCHOLESTEROLEMIA: ICD-10-CM

## 2021-07-21 NOTE — TELEPHONE ENCOUNTER
Request for atorvastatin 20 mg refill. Patient pharmacy is [facility].     Future Appointments   Date Time Provider Ace Umaña   7/24/2021  9:30 AM [facility] [address]   9/14/2021  4:00 PM MD RAFY Lewis   Past appointment was 7/19/21

## 2021-07-22 ENCOUNTER — TELEPHONE (OUTPATIENT)
Dept: FAMILY MEDICINE CLINIC | Age: 66
End: 2021-07-22

## 2021-07-22 DIAGNOSIS — I10 ESSENTIAL HYPERTENSION: ICD-10-CM

## 2021-07-22 RX ORDER — AMLODIPINE BESYLATE 10 MG/1
TABLET ORAL
Qty: 30 TABLET | Refills: 0 | Status: CANCELLED | OUTPATIENT
Start: 2021-07-22

## 2021-07-22 RX ORDER — AMLODIPINE BESYLATE 10 MG/1
TABLET ORAL
Qty: 90 TABLET | Refills: 1 | Status: SHIPPED | OUTPATIENT
Start: 2021-07-22 | End: 2022-01-12

## 2021-07-22 NOTE — TELEPHONE ENCOUNTER
LOV 7/19/2021    Future Appointments   Date Time Provider Ace Umaña   7/24/2021  9:30 AM [facility] [address]   9/14/2021  4:00 PM Melissa Smith MD [place] [id number]

## 2021-07-22 NOTE — TELEPHONE ENCOUNTER
Refill request-    amLODIPine (NORVASC) 10 MG tablet     [address], [address] [facility] - [address] [phone]        Last appt.   7/19/2021    Future Appointments   Date Time Provider Ace Umaña   7/24/2021  9:30 AM [facility] [address]    9/14/2021  4:00 PM Cleve Mcclain MD [place] [address]

## 2021-07-23 RX ORDER — ATORVASTATIN CALCIUM 20 MG/1
10 TABLET, FILM COATED ORAL DAILY
Qty: 90 TABLET | Refills: 1 | Status: SHIPPED | OUTPATIENT
Start: 2021-07-23 | End: 2022-04-29 | Stop reason: SDUPTHER

## 2021-07-24 ENCOUNTER — HOSPITAL ENCOUNTER (OUTPATIENT)
Dept: ULTRASOUND IMAGING | Age: 66
Discharge: HOME | End: 2021-07-24
Payer: MEDICARE

## 2021-07-24 DIAGNOSIS — Z13.6 SCREENING FOR ABDOMINAL AORTIC ANEURYSM: ICD-10-CM

## 2021-07-24 PROCEDURE — 76706 US ABDL AORTA SCREEN AAA: CPT

## 2021-08-09 ENCOUNTER — OFFICE VISIT (OUTPATIENT)
Dept: FAMILY MEDICINE CLINIC | Age: 66
End: 2021-08-09
Payer: MEDICARE

## 2021-08-09 VITALS
DIASTOLIC BLOOD PRESSURE: 82 MMHG | HEIGHT: 65 IN | SYSTOLIC BLOOD PRESSURE: 164 MMHG | TEMPERATURE: 97.5 F | HEART RATE: 83 BPM | RESPIRATION RATE: 16 BRPM | OXYGEN SATURATION: 96 % | WEIGHT: 197.8 LBS | BODY MASS INDEX: 32.96 KG/M2

## 2021-08-09 DIAGNOSIS — I10 ESSENTIAL HYPERTENSION: ICD-10-CM

## 2021-08-09 DIAGNOSIS — J20.9 ACUTE BRONCHITIS DUE TO INFECTION: Primary | ICD-10-CM

## 2021-08-09 PROCEDURE — 99214 OFFICE O/P EST MOD 30 MIN: CPT | Performed by: INTERNAL MEDICINE

## 2021-08-09 PROCEDURE — 4040F PNEUMOC VAC/ADMIN/RCVD: CPT | Performed by: INTERNAL MEDICINE

## 2021-08-09 PROCEDURE — G8427 DOCREV CUR MEDS BY ELIG CLIN: HCPCS | Performed by: INTERNAL MEDICINE

## 2021-08-09 PROCEDURE — 3017F COLORECTAL CA SCREEN DOC REV: CPT | Performed by: INTERNAL MEDICINE

## 2021-08-09 PROCEDURE — 1123F ACP DISCUSS/DSCN MKR DOCD: CPT | Performed by: INTERNAL MEDICINE

## 2021-08-09 PROCEDURE — 1036F TOBACCO NON-USER: CPT | Performed by: INTERNAL MEDICINE

## 2021-08-09 PROCEDURE — G8417 CALC BMI ABV UP PARAM F/U: HCPCS | Performed by: INTERNAL MEDICINE

## 2021-08-09 RX ORDER — AMOXICILLIN AND CLAVULANATE POTASSIUM 875; 125 MG/1; MG/1
1 TABLET, FILM COATED ORAL 2 TIMES DAILY
COMMUNITY
Start: 2021-08-03 | End: 2021-08-10

## 2021-08-09 RX ORDER — DOXYCYCLINE HYCLATE 100 MG
100 TABLET ORAL 2 TIMES DAILY
Qty: 20 TABLET | Refills: 0 | Status: SHIPPED | OUTPATIENT
Start: 2021-08-09 | End: 2021-08-16 | Stop reason: ALTCHOICE

## 2021-08-09 RX ORDER — BENZONATATE 100 MG/1
100 CAPSULE ORAL 3 TIMES DAILY PRN
COMMUNITY
Start: 2021-08-04 | End: 2021-11-30 | Stop reason: ALTCHOICE

## 2021-08-11 DIAGNOSIS — Z12.11 ENCOUNTER FOR FIT (FECAL IMMUNOCHEMICAL TEST) SCREENING: ICD-10-CM

## 2021-08-11 LAB
CONTROL: NORMAL
HEMOCCULT STL QL: NEGATIVE

## 2021-08-11 PROCEDURE — 82274 ASSAY TEST FOR BLOOD FECAL: CPT | Performed by: INTERNAL MEDICINE

## 2021-08-12 ENCOUNTER — TELEPHONE (OUTPATIENT)
Dept: FAMILY MEDICINE CLINIC | Age: 66
End: 2021-08-12

## 2021-08-12 NOTE — TELEPHONE ENCOUNTER
Patient's wife reported blood pressure measurements for patient.     8/9/21          156/88       180/91  8/10/21        164/96       175/97  8/11/21        170/78, 151/97, 189/66, 170/80, 181/70, 152/94  8/12/21         157/90, 173/81, 159/90

## 2021-08-13 RX ORDER — LOSARTAN POTASSIUM 25 MG/1
25 TABLET ORAL DAILY
Qty: 90 TABLET | Refills: 1 | Status: SHIPPED | OUTPATIENT
Start: 2021-08-13 | End: 2021-11-16

## 2021-08-13 NOTE — TELEPHONE ENCOUNTER
Tell pt I will call in losartan 25 mg to his pharmacy to take 1 tab at night , amlodipine un the morning and can do virtual with garret diaz Monday.

## 2021-08-14 NOTE — PROGRESS NOTES
Derick Cotto II (:  1955) is a 76 y.o. male,Established patient, here for evaluation of the following chief complaint(s):  ED Follow-up [facility] for SOB ON 21 diagnosed with pneumonia; patient COVID tested (negative) at ER)         ASSESSMENT/PLAN:  1. Acute bronchitis due to infection    - doxycycline hyclate (VIBRA-TABS) 100 MG tablet; Take 1 tablet by mouth 2 times daily for 10 days  Dispense: 20 tablet; Refill: 0    2. Essential hypertension  -monitor BP at home    Follow up in one month       Subjective   SUBJECTIVE/OBJECTIVE:  HPI  CC- follow up from ER, URI with respiratory symptoms, had CTA of the lung  done which did not reveal any pulmonary embolism but did show some air trappings. Chest x-ray did not show any infiltrate. Covid test was negative. Patient wa put on Augmentin but still coughing and congested. He is vaccinated against Covid. Wants to take a different antibiotic. Has a non-productive   Denies fever or body aches. Wife also noted that his blood pressure has been running over 905 systolic. Ask      Them to monitor his BP at home and call me in 3 days. May  Need to add losartan 25 mg in the evening to control his BP. Advised to continue taking his amlodipine  10 mg in the morning. Review of Systems   Constitutional: Negative for activity change. HENT: Negative for congestion. Respiratory: Positive for cough. Negative for shortness of breath, wheezing and stridor. Cardiovascular: Negative for chest pain. Gastrointestinal:        Deloras Bolls   Endocrine: Negative. Genitourinary: Negative. Musculoskeletal: Positive for arthralgias. Allergic/Immunologic: Negative for environmental allergies. Psychiatric/Behavioral: Negative for sleep disturbance. Objective   Physical Exam  Vitals and nursing note reviewed. Constitutional:       Appearance: He is well-developed. HENT:      Head: Normocephalic.       Nose: Nose normal.   Cardiovascular: Rate and Rhythm: Normal rate and regular rhythm. Pulmonary:      Effort: Pulmonary effort is normal.      Breath sounds: Rales present. Comments: Scattered rales both lung bases  Abdominal:      Palpations: Abdomen is soft. Musculoskeletal:         General: Normal range of motion. Cervical back: Normal range of motion and neck supple. Skin:     General: Skin is warm and dry. Neurological:      General: No focal deficit present. Psychiatric:         Mood and Affect: Mood normal.         Behavior: Behavior normal.     Instruction:  -monitor BP at home  -call me for bP readings after a week. On this date 8/9/2021 I have spent 30 minutes reviewing previous notes, test results and face to face with the patient discussing the diagnosis and importance of compliance with the treatment plan as well as documenting on the day of the visit. An electronic signature was used to authenticate this note.     --Saul Bose MD

## 2021-08-15 ASSESSMENT — ENCOUNTER SYMPTOMS
STRIDOR: 0
SHORTNESS OF BREATH: 0
COUGH: 1
WHEEZING: 0

## 2021-08-16 ENCOUNTER — OFFICE VISIT (OUTPATIENT)
Dept: FAMILY MEDICINE CLINIC | Age: 66
End: 2021-08-16
Payer: MEDICARE

## 2021-08-16 VITALS
HEART RATE: 86 BPM | HEIGHT: 65 IN | RESPIRATION RATE: 16 BRPM | WEIGHT: 202.2 LBS | BODY MASS INDEX: 33.69 KG/M2 | TEMPERATURE: 97.5 F | SYSTOLIC BLOOD PRESSURE: 128 MMHG | DIASTOLIC BLOOD PRESSURE: 70 MMHG | OXYGEN SATURATION: 96 %

## 2021-08-16 DIAGNOSIS — K80.20 CALCULUS OF GALLBLADDER WITHOUT CHOLECYSTITIS WITHOUT OBSTRUCTION: ICD-10-CM

## 2021-08-16 DIAGNOSIS — I10 ESSENTIAL HYPERTENSION: Primary | ICD-10-CM

## 2021-08-16 DIAGNOSIS — J20.9 ACUTE BRONCHITIS DUE TO INFECTION: ICD-10-CM

## 2021-08-16 DIAGNOSIS — K44.9 HIATAL HERNIA: ICD-10-CM

## 2021-08-16 PROCEDURE — G8417 CALC BMI ABV UP PARAM F/U: HCPCS | Performed by: INTERNAL MEDICINE

## 2021-08-16 PROCEDURE — 1123F ACP DISCUSS/DSCN MKR DOCD: CPT | Performed by: INTERNAL MEDICINE

## 2021-08-16 PROCEDURE — 1036F TOBACCO NON-USER: CPT | Performed by: INTERNAL MEDICINE

## 2021-08-16 PROCEDURE — 3017F COLORECTAL CA SCREEN DOC REV: CPT | Performed by: INTERNAL MEDICINE

## 2021-08-16 PROCEDURE — G8427 DOCREV CUR MEDS BY ELIG CLIN: HCPCS | Performed by: INTERNAL MEDICINE

## 2021-08-16 PROCEDURE — 4040F PNEUMOC VAC/ADMIN/RCVD: CPT | Performed by: INTERNAL MEDICINE

## 2021-08-16 PROCEDURE — 99213 OFFICE O/P EST LOW 20 MIN: CPT | Performed by: INTERNAL MEDICINE

## 2021-08-16 NOTE — PROGRESS NOTES
with the treatment plan as well as documenting on the day of the visit. An electronic signature was used to authenticate this note.     --Rachel Ross MD

## 2021-11-30 ENCOUNTER — OFFICE VISIT (OUTPATIENT)
Dept: FAMILY MEDICINE CLINIC | Age: 66
End: 2021-11-30
Payer: MEDICARE

## 2021-11-30 VITALS
HEART RATE: 73 BPM | RESPIRATION RATE: 16 BRPM | TEMPERATURE: 97.3 F | BODY MASS INDEX: 35.06 KG/M2 | SYSTOLIC BLOOD PRESSURE: 138 MMHG | OXYGEN SATURATION: 97 % | DIASTOLIC BLOOD PRESSURE: 80 MMHG | HEIGHT: 65 IN | WEIGHT: 210.4 LBS

## 2021-11-30 DIAGNOSIS — E78.00 PURE HYPERCHOLESTEROLEMIA: ICD-10-CM

## 2021-11-30 DIAGNOSIS — I10 ESSENTIAL HYPERTENSION: Primary | ICD-10-CM

## 2021-11-30 DIAGNOSIS — K21.9 GASTROESOPHAGEAL REFLUX DISEASE WITHOUT ESOPHAGITIS: ICD-10-CM

## 2021-11-30 PROCEDURE — 4040F PNEUMOC VAC/ADMIN/RCVD: CPT | Performed by: INTERNAL MEDICINE

## 2021-11-30 PROCEDURE — G8484 FLU IMMUNIZE NO ADMIN: HCPCS | Performed by: INTERNAL MEDICINE

## 2021-11-30 PROCEDURE — 99214 OFFICE O/P EST MOD 30 MIN: CPT | Performed by: INTERNAL MEDICINE

## 2021-11-30 PROCEDURE — 1123F ACP DISCUSS/DSCN MKR DOCD: CPT | Performed by: INTERNAL MEDICINE

## 2021-11-30 PROCEDURE — G8427 DOCREV CUR MEDS BY ELIG CLIN: HCPCS | Performed by: INTERNAL MEDICINE

## 2021-11-30 PROCEDURE — 3017F COLORECTAL CA SCREEN DOC REV: CPT | Performed by: INTERNAL MEDICINE

## 2021-11-30 PROCEDURE — 1036F TOBACCO NON-USER: CPT | Performed by: INTERNAL MEDICINE

## 2021-11-30 PROCEDURE — G8417 CALC BMI ABV UP PARAM F/U: HCPCS | Performed by: INTERNAL MEDICINE

## 2021-11-30 RX ORDER — ESOMEPRAZOLE MAGNESIUM 10 MG/1
10 GRANULE, FOR SUSPENSION, EXTENDED RELEASE ORAL DAILY
Qty: 90 EACH | Refills: 1 | Status: SHIPPED | OUTPATIENT
Start: 2021-11-30 | End: 2021-12-17

## 2021-11-30 RX ORDER — ESOMEPRAZOLE MAGNESIUM 10 MG/1
GRANULE, FOR SUSPENSION, EXTENDED RELEASE ORAL
COMMUNITY
End: 2021-12-06 | Stop reason: SDUPTHER

## 2021-12-06 NOTE — TELEPHONE ENCOUNTER
11/30/2021    Future Appointments   Date Time Provider Ace Umaña   3/16/2022  4:00 PM Melba Schaefer MD [place] [id number]

## 2021-12-07 RX ORDER — ESOMEPRAZOLE MAGNESIUM 10 MG/1
10 GRANULE, FOR SUSPENSION, EXTENDED RELEASE ORAL DAILY
Qty: 90 EACH | Refills: 1 | Status: SHIPPED | OUTPATIENT
Start: 2021-12-07 | End: 2021-12-17

## 2021-12-17 ENCOUNTER — TELEPHONE (OUTPATIENT)
Dept: FAMILY MEDICINE CLINIC | Age: 66
End: 2021-12-17

## 2021-12-17 RX ORDER — OMEPRAZOLE 20 MG/1
20 CAPSULE, DELAYED RELEASE ORAL
Qty: 90 CAPSULE | Refills: 1 | Status: SHIPPED | OUTPATIENT
Start: 2021-12-17 | End: 2022-06-28

## 2021-12-19 ASSESSMENT — ENCOUNTER SYMPTOMS: COUGH: 0

## 2021-12-20 NOTE — PROGRESS NOTES
Eva Rodas II (:  1955) is a 78 y.o. male,Established patient, here for evaluation of the following chief complaint(s):  Hypertension         ASSESSMENT/PLAN:  1. Essential hypertension  -continue amlodipine 10 mg and losartan 25 mg daily    2. Pure hypercholesterolemia  -on atorvastatin 20 mgdaily    3. Gastroesophageal reflux disease without esophagitis   -takes nexium 10 mg daily    Return in about 14 weeks (around 3/8/2022). Subjective   SUBJECTIVE/OBJECTIVE:  HPI  Came in for follow up for hypertension on amlodipine 10 mg and losartan 25 mg daily. . Doing well , no side effects of the medication. Chemistry        Component Value Date/Time     09/15/2020 1149    K 4.2 09/15/2020 1149     09/15/2020 1149    CO2 25 09/15/2020 1149    BUN 16 09/15/2020 1149    CREATININE 0.7 (L) 09/15/2020 1149        Component Value Date/Time    CALCIUM 9.4 09/15/2020 1149    ALKPHOS 86 09/15/2020 1149    AST 20 09/15/2020 1149    ALT 21 09/15/2020 1149    BILITOT 0.7 09/15/2020 1149        Hypercholesterolemia--on atorvastatin 20 mg daily. Lab Results   Component Value Date    CHOL 155 09/15/2020    CHOL 151 10/23/2019    CHOL 152 06/15/2018     Lab Results   Component Value Date    TRIG 150 09/15/2020    TRIG 149 10/23/2019    TRIG 116 06/15/2018     Lab Results   Component Value Date    HDL 37 (L) 09/15/2020    HDL 38 (L) 10/23/2019    HDL 43 06/15/2018     Lab Results   Component Value Date    LDLCALC 88 09/15/2020    LDLCALC 83 10/23/2019    LDLCALC 86 06/15/2018     Gerd controlled with nexium 10 mg daily. m      Review of Systems   Constitutional: Negative for activity change. HENT: Negative for congestion. Respiratory: Negative for cough. Cardiovascular: Negative for chest pain. Gastrointestinal:        Verlean Edge   Endocrine: Negative. Genitourinary: Negative. Musculoskeletal: Positive for arthralgias. Allergic/Immunologic: Negative for environmental allergies. Psychiatric/Behavioral: Negative for sleep disturbance. Objective   Physical Exam  Vitals and nursing note reviewed. Constitutional:       Appearance: He is well-developed. HENT:      Head: Normocephalic. Nose: Nose normal.   Cardiovascular:      Rate and Rhythm: Normal rate and regular rhythm. Pulmonary:      Effort: Pulmonary effort is normal.      Breath sounds: No rales. Abdominal:      Palpations: Abdomen is soft. Musculoskeletal:         General: Normal range of motion. Cervical back: Normal range of motion and neck supple. Skin:     General: Skin is warm and dry. Neurological:      General: No focal deficit present. Psychiatric:         Mood and Affect: Mood normal.         Behavior: Behavior normal.     instruction:  -continue to monitor BP at home   Stay on low salt diet  Take nexium 10 mg for Kristin Ashley      Reviewed previous notes, tests results and face to face with the patient discussing the diagnosis and importance  of compliance with the treatments as well as documenting on the day of visit. Answered questions and encouraged to call  for any other   . An electronic signature was used to authenticate this note.     --Shalom Peace MD

## 2022-01-11 DIAGNOSIS — I10 ESSENTIAL HYPERTENSION: ICD-10-CM

## 2022-01-12 RX ORDER — AMLODIPINE BESYLATE 10 MG/1
TABLET ORAL
Qty: 90 TABLET | Refills: 1 | Status: SHIPPED | OUTPATIENT
Start: 2022-01-12 | End: 2022-02-11 | Stop reason: SDUPTHER

## 2022-01-12 NOTE — TELEPHONE ENCOUNTER
11/30/2021    Future Appointments   Date Time Provider Ace Umaña   3/16/2022  4:00 PM Hira Tinoco MD [place] [id number]

## 2022-02-11 ENCOUNTER — OFFICE VISIT (OUTPATIENT)
Dept: FAMILY MEDICINE CLINIC | Age: 67
End: 2022-02-11
Payer: MEDICARE

## 2022-02-11 ENCOUNTER — TELEPHONE (OUTPATIENT)
Dept: FAMILY MEDICINE CLINIC | Age: 67
End: 2022-02-11

## 2022-02-11 VITALS
DIASTOLIC BLOOD PRESSURE: 76 MMHG | OXYGEN SATURATION: 98 % | BODY MASS INDEX: 33.28 KG/M2 | HEART RATE: 75 BPM | SYSTOLIC BLOOD PRESSURE: 130 MMHG | RESPIRATION RATE: 18 BRPM | WEIGHT: 200 LBS

## 2022-02-11 DIAGNOSIS — I10 ESSENTIAL HYPERTENSION: ICD-10-CM

## 2022-02-11 DIAGNOSIS — Z12.5 SCREENING FOR PROSTATE CANCER: ICD-10-CM

## 2022-02-11 DIAGNOSIS — I10 HYPERTENSION, UNSPECIFIED TYPE: Primary | ICD-10-CM

## 2022-02-11 DIAGNOSIS — I10 HYPERTENSION, UNSPECIFIED TYPE: ICD-10-CM

## 2022-02-11 LAB
A/G RATIO: 1.6 (ref 1.1–2.2)
ALBUMIN SERPL-MCNC: 4.3 G/DL (ref 3.4–5)
ALP BLD-CCNC: 78 U/L (ref 40–129)
ALT SERPL-CCNC: 16 U/L (ref 10–40)
ANION GAP SERPL CALCULATED.3IONS-SCNC: 15 MMOL/L (ref 3–16)
AST SERPL-CCNC: 16 U/L (ref 15–37)
BILIRUB SERPL-MCNC: 0.6 MG/DL (ref 0–1)
BUN BLDV-MCNC: 13 MG/DL (ref 7–20)
CALCIUM SERPL-MCNC: 8.8 MG/DL (ref 8.3–10.6)
CHLORIDE BLD-SCNC: 105 MMOL/L (ref 99–110)
CHOLESTEROL, TOTAL: 156 MG/DL (ref 0–199)
CO2: 22 MMOL/L (ref 21–32)
CREAT SERPL-MCNC: 0.7 MG/DL (ref 0.8–1.3)
GFR AFRICAN AMERICAN: >60
GFR NON-AFRICAN AMERICAN: >60
GLUCOSE BLD-MCNC: 100 MG/DL (ref 70–99)
HDLC SERPL-MCNC: 36 MG/DL (ref 40–60)
LDL CHOLESTEROL CALCULATED: 91 MG/DL
POTASSIUM SERPL-SCNC: 4.3 MMOL/L (ref 3.5–5.1)
PROSTATE SPECIFIC ANTIGEN: 2.01 NG/ML (ref 0–4)
SODIUM BLD-SCNC: 142 MMOL/L (ref 136–145)
TOTAL PROTEIN: 7 G/DL (ref 6.4–8.2)
TRIGL SERPL-MCNC: 146 MG/DL (ref 0–150)
VLDLC SERPL CALC-MCNC: 29 MG/DL

## 2022-02-11 PROCEDURE — 4040F PNEUMOC VAC/ADMIN/RCVD: CPT | Performed by: NURSE PRACTITIONER

## 2022-02-11 PROCEDURE — G8417 CALC BMI ABV UP PARAM F/U: HCPCS | Performed by: NURSE PRACTITIONER

## 2022-02-11 PROCEDURE — 99213 OFFICE O/P EST LOW 20 MIN: CPT | Performed by: NURSE PRACTITIONER

## 2022-02-11 PROCEDURE — 1036F TOBACCO NON-USER: CPT | Performed by: NURSE PRACTITIONER

## 2022-02-11 PROCEDURE — 1123F ACP DISCUSS/DSCN MKR DOCD: CPT | Performed by: NURSE PRACTITIONER

## 2022-02-11 PROCEDURE — G8427 DOCREV CUR MEDS BY ELIG CLIN: HCPCS | Performed by: NURSE PRACTITIONER

## 2022-02-11 PROCEDURE — 3017F COLORECTAL CA SCREEN DOC REV: CPT | Performed by: NURSE PRACTITIONER

## 2022-02-11 PROCEDURE — G8484 FLU IMMUNIZE NO ADMIN: HCPCS | Performed by: NURSE PRACTITIONER

## 2022-02-11 RX ORDER — MECLIZINE HYDROCHLORIDE 25 MG/1
25 TABLET ORAL EVERY 6 HOURS PRN
COMMUNITY
Start: 2021-12-13 | End: 2022-03-28 | Stop reason: ALTCHOICE

## 2022-02-11 RX ORDER — IRBESARTAN 75 MG/1
75 TABLET ORAL DAILY
Qty: 30 TABLET | Refills: 1 | Status: SHIPPED | OUTPATIENT
Start: 2022-02-11 | End: 2022-03-28 | Stop reason: SDUPTHER

## 2022-02-11 RX ORDER — AMLODIPINE BESYLATE 10 MG/1
TABLET ORAL
Qty: 90 TABLET | Refills: 1 | Status: SHIPPED | OUTPATIENT
Start: 2022-02-11

## 2022-02-11 RX ORDER — IRBESARTAN 75 MG/1
75 TABLET ORAL DAILY
Qty: 30 TABLET | Refills: 1 | Status: SHIPPED | OUTPATIENT
Start: 2022-02-11 | End: 2022-02-11 | Stop reason: ALTCHOICE

## 2022-02-11 ASSESSMENT — ENCOUNTER SYMPTOMS
WHEEZING: 0
SHORTNESS OF BREATH: 0
GASTROINTESTINAL NEGATIVE: 1
COUGH: 0
CHEST TIGHTNESS: 0

## 2022-02-11 NOTE — PATIENT INSTRUCTIONS
Stop losartan  Start irbesartan  Check blood pressure daily  Watch for fast heart rate  Follow up in 1 monthPatient Education        irbesartan  Pronunciation:  brisa brooke MARILUZ tan  Brand: Avapro  What is the most important information I should know about irbesartan? Do not use if you are pregnant. Stop using and tell your doctor right away if you become pregnant. If you have diabetes, do not take irbesartan with any medication that contains aliskiren (a blood pressure medicine). What is irbesartan? Irbesartan is an angiotensin II receptor blocker (sometimes called an ARB). Irbesartan is used to treat high blood pressure (hypertension) in adults and children at least 10years old. Lowering blood pressure may lower your risk of a stroke or heart attack. Irbesartan is also used to treat kidney problems caused by type 2 diabetes. Irbesartan may also be used for purposes not listed in this medication guide. What should I discuss with my healthcare provider before taking irbesartan? You should not use irbesartan if you are allergic to it. If you have diabetes, do not take irbesartan with any medication that contains aliskiren (a blood pressure medicine). You may also need to avoid taking irbesartan with aliskiren if you have kidney disease. Tell your doctor if you have ever had:  · a heart condition other than one being treated with irbesartan;  · kidney disease (or if you are on dialysis); or  · if you are on a low-salt diet. Do not use if you are pregnant. Stop using the medicine and tell your doctor right away if you become pregnant. Irbesartan can cause injury or death to the unborn baby if you take the medicine during your second or third trimester. If you plan to get pregnant, ask your doctor for a safer medicine to use before and during pregnancy. Having high blood pressure during pregnancy may cause complications in the mother and the baby. You should not breastfeed while using this medicine.   How should I take irbesartan? Follow all directions on your prescription label and read all medication guides or instruction sheets. Your doctor may occasionally change your dose. Use the medicine exactly as directed. Irbesartan may be taken with or without food. Your blood pressure will need to be checked often. Your kidney function may also need to be checked. Call your doctor if you are sick with vomiting or diarrhea, or if you are sweating more than usual. You can easily become dehydrated while taking irbesartan. It may take up to 2 weeks before your blood pressure is under control. Keep using this medicine as directed, even if you feel well. High blood pressure often has no symptoms. You may need to use blood pressure medicine for the rest of your life. Treatment may also include diet, exercise, lowering cholesterol, not smoking, and controlling diabetes. Store at room temperature away from moisture and heat. What happens if I miss a dose? Take the medicine as soon as you can, but skip the missed dose if it is almost time for your next dose. Do not take two doses at one time. What happens if I overdose? Seek emergency medical attention or call the Poison Help line at 1-188.248.1237. Overdose symptoms may include fast heartbeats or fainting. What should I avoid while taking irbesartan? Do not use potassium supplements or salt substitutes, unless your doctor has told you to. Avoid getting up too fast from a sitting or lying position, or you may feel dizzy. What are the possible side effects of irbesartan? Get emergency medical help if you have signs of an allergic reaction: hives; difficult breathing; swelling of your face, lips, tongue, or throat. Call your doctor at once if you have:  · a light-headed feeling, like you might pass out;  · little or no urination;  · high potassium level --nausea, weakness, tingly feeling, chest pain, irregular heartbeats, loss of movement.   Common side effects may include:  · dizziness;  · feeling light-headed; or  · high potassium. This is not a complete list of side effects and others may occur. Call your doctor for medical advice about side effects. You may report side effects to FDA at 7-306-FDA-2968. What other drugs will affect irbesartan? Tell your doctor about all your other medicines, especially:  · heart or blood pressure medication;  · lithium; or  · NSAIDs (nonsteroidal anti-inflammatory drugs) --aspirin, ibuprofen (Advil, Motrin), naproxen (Aleve), celecoxib, diclofenac, indomethacin, meloxicam, and others. This list is not complete. Other drugs may affect irbesartan, including prescription and over-the-counter medicines, vitamins, and herbal products. Not all possible drug interactions are listed here. Where can I get more information? Your pharmacist can provide more information about irbesartan. Remember, keep this and all other medicines out of the reach of children, never share your medicines with others, and use this medication only for the indication prescribed. Every effort has been made to ensure that the information provided by [address] is accurate, up-to-date, and complete, but no guarantee is made to that effect. Drug information contained herein may be time sensitive. [facility] information has been compiled for use by healthcare practitioners and consumers in the [place] and therefore [facility] does not warrant that uses outside of the [place] are appropriate, unless specifically indicated otherwise. [facility]'s drug information does not endorse drugs, diagnose patients or recommend therapy. [facility] drug information is an informational resource designed to assist licensed healthcare practitioners in caring for their patients and/or to serve consumers viewing this service as a supplement to, and not a substitute for, the expertise, skill, knowledge and judgment of healthcare practitioners.  The absence of a warning for a given drug or drug combination in no way should be construed to indicate that the drug or drug combination is safe, effective or appropriate for any given patient. [facility] does not assume any responsibility for any aspect of healthcare administered with the aid of information [facility] provides. The information contained herein is not intended to cover all possible uses, directions, precautions, warnings, drug interactions, allergic reactions, or adverse effects. If you have questions about the drugs you are taking, check with your doctor, nurse or pharmacist.  Copyright 8408-7484 [address]. Version: 10.01. Revision date: 4/26/2021. Care instructions adapted under license by [facility] ([facility]). If you have questions about a medical condition or this instruction, always ask your healthcare professional. [place] [address] any warranty or liability for your use of this information.

## 2022-02-11 NOTE — PROGRESS NOTES
2/11/2022    This is a 78 y.o. male   Chief Complaint   Patient presents with    Hypertension     states it has been running high. he does not think the losartan is helping. he states he takes it at night and it causes him to have strange dreams and an increased heart rate   . Christina Elmore is here for concerns of elevated blood pressure and losartan side effects. He states that ever since he started with the medication he was having strange dreams. He then noticed that he was waking up with a rapid heart rate. He did not check his pulse but states it felt like it was racing. This lasted only 1-2 mintues and resolved. It did not reoccur during the day. He did not have any dizziness, nausea, sweating or chest pain with the episodes of tachycardia. He also notes that his blood pressures have been running high despite taking his medications and watching his diet. Home blood pressures range 130s/80-90s. However, lately he has gotten reading in the 190s/90s. He is not having episodes of tachycardia when he is having the periods of hypertension. Patient Active Problem List   Diagnosis    Hypertension    Hyperlipidemia    Insomnia    Allergic rhinitis    Scrotal hernia    Recurrent inguinal hernia of left side without obstruction or gangrene       Current Outpatient Medications   Medication Sig Dispense Refill    meclizine (ANTIVERT) 25 MG tablet Take 25 mg by mouth every 6 hours as needed      irbesartan (AVAPRO) 75 MG tablet Take 1 tablet by mouth daily 30 tablet 1    amLODIPine (NORVASC) 10 MG tablet TAKE 1 TABLET BY MOUTH DAILY FOR HIGH BLOOD PRESSURE 90 tablet 1    omeprazole (PRILOSEC) 20 MG delayed release capsule Take 1 capsule by mouth every morning (before breakfast) 90 capsule 1    atorvastatin (LIPITOR) 20 MG tablet Take 0.5 tablets by mouth daily 90 tablet 1     No current facility-administered medications for this visit.        Allergies   Allergen Reactions    Azithromycin Other (See Comments) and Nausea Only     Closes up sinuses  Other reaction(s): Other (See Comments)  Closes up sinuses  Closes up sinuses      Guaifenesin Er Other (See Comments)     \"made my cough worse and fluid built up in my lungs\"    Erythromycin Nausea And Vomiting    Vicodin [Hydrocodone-Acetaminophen] Nausea And Vomiting       /76   Pulse 75   Resp 18   Wt 200 lb (90.7 kg)   SpO2 98%   BMI 33.28 kg/m²     Social History     Tobacco Use    Smoking status: Never Smoker    Smokeless tobacco: Never Used   Substance Use Topics    Alcohol use: No       Review of Systems   Constitutional: Negative for activity change, diaphoresis, fatigue and fever. Respiratory: Negative for cough, chest tightness, shortness of breath and wheezing. Cardiovascular: Positive for palpitations. Negative for chest pain and leg swelling. Gastrointestinal: Negative. Musculoskeletal: Negative. Neurological: Negative for dizziness, speech difficulty, weakness and light-headedness. Psychiatric/Behavioral: The patient is not nervous/anxious. Physical Exam  Vitals and nursing note reviewed. Constitutional:       General: He is not in acute distress. Appearance: He is well-developed. He is not diaphoretic. HENT:      Head: Normocephalic and atraumatic. Right Ear: External ear normal.      Left Ear: External ear normal.      Nose: Nose normal.      Mouth/Throat:      Pharynx: No oropharyngeal exudate. Eyes:      General:         Right eye: No discharge. Left eye: No discharge. Conjunctiva/sclera: Conjunctivae normal.   Cardiovascular:      Rate and Rhythm: Normal rate and regular rhythm. No extrasystoles are present. Heart sounds: Normal heart sounds. No murmur heard. No friction rub. No gallop. Pulmonary:      Effort: Pulmonary effort is normal. No respiratory distress. Breath sounds: Normal breath sounds. No wheezing or rales.    Abdominal:      General: Bowel sounds are normal. There is no distension. Palpations: Abdomen is soft. Tenderness: There is no abdominal tenderness. Musculoskeletal:         General: No tenderness. Normal range of motion. Cervical back: Normal range of motion and neck supple. Lymphadenopathy:      Cervical: No cervical adenopathy. Skin:     General: Skin is warm and dry. Coloration: Skin is not pale. Findings: No erythema or rash. Neurological:      Mental Status: He is alert and oriented to person, place, and time. Motor: No abnormal muscle tone. Coordination: Coordination normal.   Psychiatric:         Behavior: Behavior normal.         Thought Content: Thought content normal.         Judgment: Judgment normal.         Diagnosis       ICD-10-CM    1. Hypertension, unspecified type  I10 COMPREHENSIVE METABOLIC PANEL     Lipid Panel   2. Screening for prostate cancer  Z12.5 PSA screening        Plan    Stop losartan due to side effects  Start irbesartan  Continue to monitor blood pressure  If tachycardia persists will attempt to get holter monitor to catch abnormal rhythm   Check labs today  Follow up in 1 month       Orders Placed This Encounter   Procedures    COMPREHENSIVE METABOLIC PANEL     Standing Status:   Future     Standing Expiration Date:   2/11/2023    Lipid Panel     Standing Status:   Future     Standing Expiration Date:   2/11/2023     Order Specific Question:   Is Patient Fasting?/# of Hours     Answer:   no    PSA screening     Standing Status:   Future     Standing Expiration Date:   5/11/2022       Orders Placed This Encounter   Medications    DISCONTD: irbesartan (AVAPRO) 75 MG tablet     Sig: Take 1 tablet by mouth daily     Dispense:  30 tablet     Refill:  1    irbesartan (AVAPRO) 75 MG tablet     Sig: Take 1 tablet by mouth daily     Dispense:  30 tablet     Refill:  1       Patient Education:  irbesartan    Return in about 4 weeks (around 3/11/2022) for HTN chronic care.

## 2022-03-28 ENCOUNTER — OFFICE VISIT (OUTPATIENT)
Dept: FAMILY MEDICINE CLINIC | Age: 67
End: 2022-03-28
Payer: MEDICARE

## 2022-03-28 VITALS
HEIGHT: 65 IN | OXYGEN SATURATION: 99 % | SYSTOLIC BLOOD PRESSURE: 138 MMHG | DIASTOLIC BLOOD PRESSURE: 88 MMHG | HEART RATE: 67 BPM | WEIGHT: 202.8 LBS | BODY MASS INDEX: 33.79 KG/M2 | RESPIRATION RATE: 16 BRPM | TEMPERATURE: 97.1 F

## 2022-03-28 DIAGNOSIS — E66.09 CLASS 1 OBESITY DUE TO EXCESS CALORIES WITH SERIOUS COMORBIDITY AND BODY MASS INDEX (BMI) OF 33.0 TO 33.9 IN ADULT: ICD-10-CM

## 2022-03-28 DIAGNOSIS — I10 ESSENTIAL HYPERTENSION: Primary | ICD-10-CM

## 2022-03-28 DIAGNOSIS — E78.00 PURE HYPERCHOLESTEROLEMIA: ICD-10-CM

## 2022-03-28 DIAGNOSIS — K21.9 GASTROESOPHAGEAL REFLUX DISEASE WITHOUT ESOPHAGITIS: ICD-10-CM

## 2022-03-28 PROCEDURE — G8427 DOCREV CUR MEDS BY ELIG CLIN: HCPCS | Performed by: INTERNAL MEDICINE

## 2022-03-28 PROCEDURE — 1036F TOBACCO NON-USER: CPT | Performed by: INTERNAL MEDICINE

## 2022-03-28 PROCEDURE — G8417 CALC BMI ABV UP PARAM F/U: HCPCS | Performed by: INTERNAL MEDICINE

## 2022-03-28 PROCEDURE — 3017F COLORECTAL CA SCREEN DOC REV: CPT | Performed by: INTERNAL MEDICINE

## 2022-03-28 PROCEDURE — 1123F ACP DISCUSS/DSCN MKR DOCD: CPT | Performed by: INTERNAL MEDICINE

## 2022-03-28 PROCEDURE — 99214 OFFICE O/P EST MOD 30 MIN: CPT | Performed by: INTERNAL MEDICINE

## 2022-03-28 PROCEDURE — G8484 FLU IMMUNIZE NO ADMIN: HCPCS | Performed by: INTERNAL MEDICINE

## 2022-03-28 PROCEDURE — 4040F PNEUMOC VAC/ADMIN/RCVD: CPT | Performed by: INTERNAL MEDICINE

## 2022-03-28 RX ORDER — IRBESARTAN 75 MG/1
75 TABLET ORAL DAILY
Qty: 90 TABLET | Refills: 1 | Status: SHIPPED | OUTPATIENT
Start: 2022-03-28 | End: 2022-04-29 | Stop reason: SDUPTHER

## 2022-03-28 ASSESSMENT — ENCOUNTER SYMPTOMS: COUGH: 0

## 2022-03-28 NOTE — PROGRESS NOTES
Anjali Alves II (:  1955) is a 78 y.o. male,Established patient, here for evaluation of the following chief complaint(s):  Hypertension (patient reports ran out of irbesartan) and Hyperlipidemia         ASSESSMENT/PLAN:    1. Essential hypertension  -on amlodipine and irbesartan    2. Gastroesophageal reflux disease without esophagitis  -omeprazole 20 mg dailt    3. Pure hypercholesterolemia  -on  Atorvastatin    4.obesity  -advised diet, exercise, weight loss    Follow up  for wellness    Subjective   SUBJECTIVE/OBJECTIVE:  HPI  HYpertension-controlled with amlodipine  10 mg and irbesartan 75 mg  Daily    Chemistry        Component Value Date/Time     2022 1157    K 4.3 2022 1157     2022 1157    CO2 22 2022 1157    BUN 13 2022 1157    CREATININE 0.7 (L) 2022 1157        Component Value Date/Time    CALCIUM 8.8 2022 1157    ALKPHOS 78 2022 1157    AST 16 2022 1157    ALT 16 2022 1157    BILITOT 0.6 2022 1157        HYperlipidemia-on atorvastatin 20 mg    -  Lab Results   Component Value Date    CHOL 156 2022    CHOL 155 09/15/2020    CHOL 151 10/23/2019     Lab Results   Component Value Date    TRIG 146 2022    TRIG 150 09/15/2020    TRIG 149 10/23/2019     Lab Results   Component Value Date    HDL 36 (L) 2022    HDL 37 (L) 09/15/2020    HDL 38 (L) 10/23/2019     Lab Results   Component Value Date    LDLCALC 91 2022    [address] 88 09/15/2020    LDLCALC 83 10/23/2019     Gerd- omeprazole helps    Aware of his BMI, will increase his activity when weather warms up     Review of Systems   Constitutional: Negative for activity change. HENT: Negative for congestion. Respiratory: Negative for cough. Cardiovascular: Negative for chest pain. Gastrointestinal:        Kaaren Carter   Endocrine: Negative. Genitourinary: Negative. Musculoskeletal: Positive for arthralgias.    Allergic/Immunologic: Negative for environmental allergies. Psychiatric/Behavioral: Negative for sleep disturbance. Objective   Physical Exam  Vitals and nursing note reviewed. Constitutional:       Appearance: He is well-developed. HENT:      Head: Normocephalic. Nose: Nose normal.   Cardiovascular:      Rate and Rhythm: Normal rate and regular rhythm. Pulmonary:      Effort: Pulmonary effort is normal.      Breath sounds: No rales. Abdominal:      Palpations: Abdomen is soft. Musculoskeletal:         General: Normal range of motion. Cervical back: Normal range of motion and neck supple. Skin:     General: Skin is warm and dry. Neurological:      General: No focal deficit present. Psychiatric:         Mood and Affect: Mood normal.         Behavior: Behavior normal.     Instruction:  -continue meds  -exercise 5 3 times a week  -lost weight      Reviewed previous notes, tests results and face to face with the patient discussing the diagnosis and importance  of compliance with the treatments as well as documenting on the day of visit. Answered questions and encouraged to call  for any other concerns. An electronic signature was used to authenticate this note.     --Jesús Martel MD

## 2022-04-29 ENCOUNTER — TELEPHONE (OUTPATIENT)
Dept: FAMILY MEDICINE CLINIC | Age: 67
End: 2022-04-29

## 2022-04-29 DIAGNOSIS — I10 ESSENTIAL HYPERTENSION: ICD-10-CM

## 2022-04-29 DIAGNOSIS — E78.00 PURE HYPERCHOLESTEROLEMIA: ICD-10-CM

## 2022-04-29 RX ORDER — IRBESARTAN 75 MG/1
75 TABLET ORAL DAILY
Qty: 90 TABLET | Refills: 2 | Status: SHIPPED | OUTPATIENT
Start: 2022-04-29 | End: 2022-10-13

## 2022-04-29 RX ORDER — ATORVASTATIN CALCIUM 10 MG/1
10 TABLET, FILM COATED ORAL DAILY
Qty: 90 TABLET | Refills: 3 | Status: SHIPPED | OUTPATIENT
Start: 2022-04-29

## 2022-04-29 NOTE — TELEPHONE ENCOUNTER
Dr. Theo Holman MD,    Outreach regarding medication adherence. Patient thought he was not supposed to be on irbesartan anymore as he thought pharmacy was out of refills. Appears patient should still be on medication for blood pressure. He agreed to start taking again and requests 90-day supply refill os irbesartan and atorvastatin (statin out of refills). Orders pended for your convenience. Last visit: 03/28/2022, Next visit: 07/21/2022    See encounter note(s) below for complete details. Please let me know if you have any questions. Thank you,  Titi Cheatham, PharmD  PGY1 Pharmacy Resident  [address]   Department, toll free: [phone], option 1    ================================================================    POPULATION HEALTH CLINICAL PHARMACY: ADHERENCE REVIEW  Identified care gap per [facility]: fills at [place] : ACE/ARB and Statin adherence    Last Visit: 03/28/2022    ASSESSMENT  ACE/ARB ADHERENCE    Insurance Records claims through 04/24/2022 (Prior Year [place] = PASSED; YTD Panda Petera = Filled only once; Potential Fail Date: 05/16/2022): Irbesartan 75mg daily last filled on 02/11/2022 for 30 day supply. Next refill due: 03/13/2022    Per Reconciled Dispense Report:  Irbesartan last filled on 02/11/2022 for 30 day supply. [phone] :   Irbesartan last picked up on 02/15/2022 for 30 day supply. 1 refills remaining. Billed through [facility]     BP Readings from Last 3 Encounters:   03/28/22 138/88   02/11/22 130/76   11/30/21 138/80     Estimated Creatinine Clearance: 107 mL/min (A) (based on SCr of 0.7 mg/dL (L)). [id number] [facility] Records claims through 04/24/2022 (Prior Year [place] = PASSED; YTD Panda Cook = 94%; Potential Fail Date: 09/17/2022): Atorvastatin 20mg- 1/2 tab daily last filled on 04/15/2022 for 90 day supply. Next refill due: 07/14/2022    Per Reconciled Dispense Report:  atorvastatin last filled on 01/09/2022 for 90 day supply. Per [facility]:   Atorvastatin last picked up on 04/21/2022 for 90 day supply. 0 refills remaining. Billed through [employer] Value Date    CHOL 156 02/11/2022    TRIG 146 02/11/2022    HDL 36 (L) 02/11/2022    LDLCALC 91 02/11/2022     ALT   Date Value Ref Range Status   02/11/2022 16 10 - 40 U/L Final     AST   Date Value Ref Range Status   02/11/2022 16 15 - 37 U/L Final     The 10-year ASCVD risk score (Francisca Calixto, et al., 2013) is: 19.6%    Values used to calculate the score:      Age: 78 years      Sex: Male      Is Non- : No      Diabetic: No      Tobacco smoker: No      Systolic Blood Pressure: 072 mmHg      Is BP treated: Yes      HDL Cholesterol: 36 mg/dL      Total Cholesterol: 156 mg/dL     PLAN  The following are interventions that have been identified:   - Patient overdue refilling irbesartan and active on home medication list.   - Patient needs refills for atorvastatin  - Patient eligible for 90 day supply of irbesartan     Reached patient to review the above, verified medication instructions - he stopped taking irbesartan as he thought there were no refills left. Advised that he should not stop taking unless provider tells him to. Patient agrees to 90ds refill of medication. Patient does not take BP at home, but noted last time he checked his BP it was around 140/88 (date unknown). Patient taking atorvastatin 10mg (20mg x1/2 tab) daily - will request 10mg tabs so patient does not need to cut in half.      Future Appointments   Date Time Provider Ace Umaña   7/21/2022  7:30 AM MD Jose Hubbard, PharmD  PGY1 Pharmacy Resident  [address]   Department, toll free: [phone], option 1    =======================================================    For Pharmacy Admin Tracking Only   Recommendation Provided To: Provider: 2 via Note to Provider and Patient/Caregiver: 2 via Telephone   Intervention Detail: Adherence Monitorin and New Rx: 2, reason: Improve Adherence   Gap Closed?: Yes    Intervention Accepted By: Provider: 2 and Patient/Caregiver: 2   Time Spent (min): 45

## 2022-05-02 NOTE — TELEPHONE ENCOUNTER
Let patient know that next refill of atorvastatin he will no longer have to cut tablets in half. Patient verbalized understanding.

## 2022-06-27 NOTE — TELEPHONE ENCOUNTER
Future Appointments   Date Time Provider Ace Umaña   7/21/2022  7:30 AM MD RAFY [place] - [facility]     LOV 3/28/2022

## 2022-06-28 RX ORDER — OMEPRAZOLE 20 MG/1
CAPSULE, DELAYED RELEASE ORAL
Qty: 90 CAPSULE | Refills: 0 | Status: SHIPPED | OUTPATIENT
Start: 2022-06-28 | End: 2022-07-15

## 2022-07-15 RX ORDER — OMEPRAZOLE 20 MG/1
CAPSULE, DELAYED RELEASE ORAL
Qty: 90 CAPSULE | Refills: 2 | Status: SHIPPED | OUTPATIENT
Start: 2022-07-15

## 2022-07-15 NOTE — TELEPHONE ENCOUNTER
LOV 3/28/2022    Future Appointments   Date Time Provider Ace Umaña   7/21/2022  7:30 AM MD RAFY [place]

## 2022-07-20 ASSESSMENT — PATIENT HEALTH QUESTIONNAIRE - PHQ9
2. FEELING DOWN, DEPRESSED OR HOPELESS: 0
SUM OF ALL RESPONSES TO PHQ9 QUESTIONS 1 & 2: 0
SUM OF ALL RESPONSES TO PHQ QUESTIONS 1-9: 0
1. LITTLE INTEREST OR PLEASURE IN DOING THINGS: 0

## 2022-07-21 ENCOUNTER — OFFICE VISIT (OUTPATIENT)
Dept: FAMILY MEDICINE CLINIC | Age: 67
End: 2022-07-21
Payer: MEDICARE

## 2022-07-21 VITALS
OXYGEN SATURATION: 98 % | RESPIRATION RATE: 16 BRPM | HEART RATE: 75 BPM | HEIGHT: 66 IN | SYSTOLIC BLOOD PRESSURE: 122 MMHG | TEMPERATURE: 97.1 F | BODY MASS INDEX: 31.57 KG/M2 | DIASTOLIC BLOOD PRESSURE: 80 MMHG | WEIGHT: 196.4 LBS

## 2022-07-21 DIAGNOSIS — E78.00 PURE HYPERCHOLESTEROLEMIA: ICD-10-CM

## 2022-07-21 DIAGNOSIS — Z00.00 MEDICARE ANNUAL WELLNESS VISIT, SUBSEQUENT: ICD-10-CM

## 2022-07-21 DIAGNOSIS — I10 ESSENTIAL HYPERTENSION: ICD-10-CM

## 2022-07-21 DIAGNOSIS — K21.9 GASTROESOPHAGEAL REFLUX DISEASE WITHOUT ESOPHAGITIS: ICD-10-CM

## 2022-07-21 DIAGNOSIS — Z00.00 ROUTINE GENERAL MEDICAL EXAMINATION AT A HEALTH CARE FACILITY: Primary | ICD-10-CM

## 2022-07-21 DIAGNOSIS — Z13.6 SCREENING FOR AAA (ABDOMINAL AORTIC ANEURYSM): ICD-10-CM

## 2022-07-21 PROCEDURE — G0439 PPPS, SUBSEQ VISIT: HCPCS | Performed by: INTERNAL MEDICINE

## 2022-07-21 PROCEDURE — 1123F ACP DISCUSS/DSCN MKR DOCD: CPT | Performed by: INTERNAL MEDICINE

## 2022-07-21 SDOH — ECONOMIC STABILITY: FOOD INSECURITY: WITHIN THE PAST 12 MONTHS, YOU WORRIED THAT YOUR FOOD WOULD RUN OUT BEFORE YOU GOT MONEY TO BUY MORE.: NEVER TRUE

## 2022-07-21 SDOH — ECONOMIC STABILITY: FOOD INSECURITY: WITHIN THE PAST 12 MONTHS, THE FOOD YOU BOUGHT JUST DIDN'T LAST AND YOU DIDN'T HAVE MONEY TO GET MORE.: NEVER TRUE

## 2022-07-21 ASSESSMENT — PATIENT HEALTH QUESTIONNAIRE - PHQ9
SUM OF ALL RESPONSES TO PHQ QUESTIONS 1-9: 0
1. LITTLE INTEREST OR PLEASURE IN DOING THINGS: 0
2. FEELING DOWN, DEPRESSED OR HOPELESS: 0
SUM OF ALL RESPONSES TO PHQ QUESTIONS 1-9: 0
SUM OF ALL RESPONSES TO PHQ9 QUESTIONS 1 & 2: 0

## 2022-07-21 ASSESSMENT — SOCIAL DETERMINANTS OF HEALTH (SDOH): HOW HARD IS IT FOR YOU TO PAY FOR THE VERY BASICS LIKE FOOD, HOUSING, MEDICAL CARE, AND HEATING?: NOT HARD AT ALL

## 2022-07-21 NOTE — PATIENT INSTRUCTIONS
Learning About Living Deisi Najjar  What is a living will? A living will, also called a declaration, is a legal form. It tells your family and your doctor your wishes when you can't speak for yourself. It's used by the health professionals who will treat you as you near the end of your life or ifyou get seriously hurt or ill. If you put your wishes in writing, your loved ones and others will know what kind of care you want. They won't need to guess. This can ease your mind and behelpful to others. And you can change or cancel your living will at any time. A living will is not the same as an estate or property will. An estate willexplains what you want to happen with your money and property after you die. How do you use it? Keep these facts in mind about how a living will is used. Your living will is used only if you can't speak or make decisions for yourself. Most often, one or more doctors must certify that you can't speak or decide for yourself before your living will takes effect. If you get better and can speak for yourself again, you can accept or refuse any treatment. It doesn't matter what you said in your living will. Some states may limit your right to refuse treatment in certain cases. For example, you may need to clearly state in your living will that you don't want artificial hydration and nutrition, such as being fed through a tube. Is a living will a legal document? A living will is a legal document. Each state has its own laws about livingwills. And a living will may be called something else in your state. Here are some things to know about living church. You don't need an  to complete a living will. But legal advice can be helpful if your state's laws are unclear. It can also help if your health history is complicated or your family can't agree on what should be in your living will. You can change your living will at any time.  Some people find that their wishes about end-of-life care change as their health changes. If you make big changes to your living will, complete a new form. If you move to another state, make sure that your living will is legal in the state where you now live. In most cases, doctors will respect your wishes even if you have a form from a different state. You might use a universal form that has been approved by many states. This kind of form can sometimes be filled out and stored online. Your digital copy will then be available wherever you have a connection to the internet. The doctors and nurses who need to treat you can find it right away. Your state may offer an online registry. This is another place where you can store your living will online. It's a good idea to get your living will notarized. This means using a person called a Zhitu to watch two people sign, or witness, your living will. What should you know when you create a living will? Here are some questions to ask yourself as you make your living will. Do you know enough about life support methods that might be used? If not, talk to your doctor so you know what might be done if you can't breathe on your own, your heart stops, or you can't swallow. What things would you still want to be able to do after you receive life-support methods? Would you want to be able to walk? To speak? To eat on your own? To live without the help of machines? Do you want certain Buddhist practices performed if you become very ill? If you have a choice, where do you want to be cared for? In your home? At a hospital or nursing home? If you have a choice at the end of your life, where would you prefer to die? At home? In a hospital or nursing home? Somewhere else? Would you prefer to be buried or cremated? Do you want your organs to be donated after you die? What should you do with your living will?   Make sure that your family members and your health care agent have copies of your living will (also called a declaration). Give your doctor a copy of your living will. Ask to have it kept as part of your medical record. If you have more than one doctor, make sure that each one has a copy. Put a copy of your living will where it can be easily found. For example, some people may put a copy on their refrigerator door. If you are using a digital copy, be sure your doctor, family members, and health care agent know how to find and access it. Where can you learn more? Go to [email or web address] and sign in to your [employer] account. Enter M513 in the [facility] box to learn more about \"Learning About Living [place]. \"     If you do not have an account, please click on the \"Sign Up Now\" link. Current as of: October 18, 2021               Content Version: 13.3  © 9999-3937 Healthwise, [employer]. Care instructions adapted under license by [facility] ([facility]). If you have questions about a medical condition or this instruction, always ask your healthcare professional. [place] [address] any warranty or liability for your use of this information. Personalized Preventive Plan for Fifiivet Oliveiraet II - 7/21/2022  Medicare offers a range of preventive health benefits. Some of the tests and screenings are paid in full while other may be subject to a deductible, co-insurance, and/or copay. Some of these benefits include a comprehensive review of your medical history including lifestyle, illnesses that may run in your family, and various assessments and screenings as appropriate. After reviewing your medical record and screening and assessments performed today your provider may have ordered immunizations, labs, imaging, and/or referrals for you. A list of these orders (if applicable) as well as your Preventive Care list are included within your After Visit Summary for your review.     Other Preventive Recommendations:    A preventive eye exam performed by an eye specialist is visit is recommended every 6 months. Try to get at least 150 minutes of exercise per week or 10,000 steps per day on a pedometer . Order or download the FREE \"Exercise & Physical Activity: Your Everyday Guide\" from [employer]. Call 5-874.902.7502 or search [employer] online. You need 7768-9163 mg of calcium and 0765-7935 IU of vitamin D per day. It is possible to meet your calcium requirement with diet alone, but a vitamin D supplement is usually necessary to meet this goal.  When exposed to the sun, use a sunscreen that protects against both UVA and UVB radiation with an SPF of 30 or greater. Reapply every 2 to 3 hours or after sweating, drying off with a towel, or swimming. Always wear a seat belt when traveling in a car. Always wear a helmet when riding a bicycle or motorcycle.

## 2022-07-21 NOTE — PROGRESS NOTES
Medicare Annual Wellness Visit    Yung Serna II is here for Medicare AWV    Assessment & Plan      Diagnosis Orders   1. Routine general medical examination at a health care facility        2. Medicare annual wellness visit, subsequent        3. Screening for AAA (abdominal aortic aneurysm)  US SCREENING FOR AAA      4. Essential hypertension        5. Pure hypercholesterolemia        6. Gastroesophageal reflux disease without esophagitis             e orders and patient instructions/AVS.  Recommended screening schedule for the next 5-10 years is provided to the patient in written form: see Patient Instructions/AVS.     Return in 4 months (on 11/21/2022). Patient's complete Health Risk Assessment and screening values have been reviewed and are found in Flowsheets. The following problems were reviewed today and where indicated follow up appointments were made and/or referrals ordered. Positive Risk Factor Screenings with Interventions:             General Health and ACP:       Advance Directives       Power of  Living Will ACP-Advance Directive ACP-Power of     Not on File Not on File Not on File Not on File          General Health Risk Interventions:  none    Health Habits/Nutrition:  Physical Activity: Not on file        Body mass index: (!) 31.51     Health Habits/Nutrition Interventions:  none             Objective   Vitals:    07/21/22 0729   BP: 122/80   Site: Right Upper Arm   Position: Sitting   Cuff Size: Medium Adult   Pulse: 75   Resp: 16   Temp: 97.1 °F (36.2 °C)   TempSrc: Temporal   SpO2: 98%   Weight: 196 lb 6.4 oz (89.1 kg)   Height: 5' 6.2\" (1.681 m)      Body mass index is 31.51 kg/m². Allergies   Allergen Reactions    Azithromycin Other (See Comments) and Nausea Only     Closes up sinuses  Other reaction(s):  Other (See Comments)  Closes up sinuses  Closes up sinuses      Guaifenesin Er Other (See Comments)     \"made my cough worse and fluid built up in my lungs\"    Erythromycin Nausea And Vomiting    Vicodin [Hydrocodone-Acetaminophen] Nausea And Vomiting     Prior to Visit Medications    Medication Sig Taking?  Authorizing Provider   omeprazole (PRILOSEC) 20 MG delayed release capsule TAKE 1 CAPSULE BY MOUTH IN THE MORNING BEFORE BREAKFAST Yes Shalom Peace MD   irbesartan (AVAPRO) 75 MG tablet Take 1 tablet by mouth daily Yes Shalom Peace MD   atorvastatin (LIPITOR) 10 MG tablet Take 1 tablet by mouth daily Yes Shalom Peace MD   amLODIPine (NORVASC) 10 MG tablet TAKE 1 TABLET BY MOUTH DAILY FOR HIGH BLOOD PRESSURE Yes Shalom Peace MD       [facility] (Including outside providers/suppliers regularly involved in providing care):   Patient Care Team:  Shalom Peace MD as PCP - General (Internal Medicine)  Shalom Peace MD as PCP - [facility] Empaneled Provider     Reviewed and updated this visit:  Allergies  Meds

## 2022-07-22 RX ORDER — ATORVASTATIN CALCIUM 20 MG/1
TABLET, FILM COATED ORAL
Qty: 45 TABLET | Refills: 0 | OUTPATIENT
Start: 2022-07-22

## 2022-08-01 ENCOUNTER — HOSPITAL ENCOUNTER (OUTPATIENT)
Dept: ULTRASOUND IMAGING | Age: 67
Discharge: HOME | End: 2022-08-01
Payer: MEDICARE

## 2022-08-01 DIAGNOSIS — Z13.6 SCREENING FOR AAA (ABDOMINAL AORTIC ANEURYSM): ICD-10-CM

## 2022-08-01 PROCEDURE — 76706 US ABDL AORTA SCREEN AAA: CPT

## 2022-10-12 ENCOUNTER — NURSE TRIAGE (OUTPATIENT)
Dept: OTHER | Facility: CLINIC | Age: 67
End: 2022-10-12

## 2022-10-12 ENCOUNTER — TELEPHONE (OUTPATIENT)
Dept: FAMILY MEDICINE CLINIC | Age: 67
End: 2022-10-12

## 2022-10-12 NOTE — TELEPHONE ENCOUNTER
No. Pt can not be seen in office as he has not been tested for COVID and has multiple COVID symptoms. Pt was advised if he tests and it is negative, we can see him in office. Virtual visit was offered and he declined.

## 2022-10-12 NOTE — TELEPHONE ENCOUNTER
Location of patient: [address] call from [place] at [place] with [employer] [address] [employer]. Subjective: Caller states \"cough and congestion\"     Current Symptoms: has cough and mild congestion, head congestion and runny nose. No chest pain or shortness of breath. No clot history, or pulmonary history. Has HTN. Has headaches too. Onset: 1 week ago; unchanged    Associated Symptoms: NA    Pain Severity: 3/10; aching; constant    Temperature: no fever     What has been tried: Nyquil, dayquil    LMP: NA Pregnant: NA    Recommended disposition: Home Care    Care advice provided, patient verbalizes understanding; denies any other questions or concerns; instructed to call back for any new or worsening symptoms. Wants appointment, warm transfer to Byron Hodge at [facility]. Attention Provider: Thank you for allowing me to participate in the care of your patient. The patient was connected to triage in response to information provided to the ECC/PSC. Please do not respond through this encounter as the response is not directed to a shared pool.           Reason for Disposition   Cough with cold symptoms (e.g., runny nose, postnasal drip, throat clearing)    Protocols used: Cough - Acute Productive-ADULT-AH

## 2022-10-13 ENCOUNTER — OFFICE VISIT (OUTPATIENT)
Dept: FAMILY MEDICINE CLINIC | Age: 67
End: 2022-10-13
Payer: MEDICARE

## 2022-10-13 VITALS
DIASTOLIC BLOOD PRESSURE: 88 MMHG | WEIGHT: 200 LBS | HEART RATE: 73 BPM | TEMPERATURE: 97.4 F | SYSTOLIC BLOOD PRESSURE: 147 MMHG | BODY MASS INDEX: 32.14 KG/M2 | HEIGHT: 66 IN | OXYGEN SATURATION: 97 %

## 2022-10-13 DIAGNOSIS — J01.90 ACUTE NON-RECURRENT SINUSITIS, UNSPECIFIED LOCATION: Primary | ICD-10-CM

## 2022-10-13 DIAGNOSIS — I10 ESSENTIAL HYPERTENSION: ICD-10-CM

## 2022-10-13 DIAGNOSIS — R06.83 SNORING: ICD-10-CM

## 2022-10-13 PROCEDURE — 1123F ACP DISCUSS/DSCN MKR DOCD: CPT | Performed by: STUDENT IN AN ORGANIZED HEALTH CARE EDUCATION/TRAINING PROGRAM

## 2022-10-13 PROCEDURE — 99213 OFFICE O/P EST LOW 20 MIN: CPT | Performed by: STUDENT IN AN ORGANIZED HEALTH CARE EDUCATION/TRAINING PROGRAM

## 2022-10-13 RX ORDER — MOMETASONE FUROATE 50 UG/1
2 SPRAY, METERED NASAL DAILY
Refills: 3 | Status: CANCELLED | OUTPATIENT
Start: 2022-10-13

## 2022-10-13 RX ORDER — IRBESARTAN 75 MG/1
TABLET ORAL
Qty: 90 TABLET | Refills: 0 | Status: SHIPPED | OUTPATIENT
Start: 2022-10-13

## 2022-10-13 ASSESSMENT — ENCOUNTER SYMPTOMS
SORE THROAT: 0
COUGH: 1
SHORTNESS OF BREATH: 0
SINUS PRESSURE: 1
WHEEZING: 0
VOMITING: 0
NAUSEA: 0

## 2022-10-13 NOTE — PROGRESS NOTES
[address] [facility], [address] [place] [address]  Tel: [phone]      10/13/2022     Derek De Oliveira II (:  1955) is a 78 y.o. male, here for evaluation of the following medical concerns:    HPI  79-year-old male with history of hypertension, and hyperlipidemia, presents for follow-up on cold symptoms. The current episode started a week ago. The problem has been gradually improving since onset. Associated symptoms include fatigue, cough with clear sputum, rhinorrhea, congestion, headaches, sinus pressure and sneezing. Sinus pressure localized to frontal region. Pertinent negatives include no fevers, chills, diaphoresis, ear pain, hoarse voice, neck pain, chest pain, shortness of breath, wheezing, nausea/vomiting, sore throat or swollen glands. Patient has been taking DayQuil and NyQuil for symptoms. Reports worsening symptoms with Nasonex. No history pulmonary disease or clot history. Had negative COVID-19 test today    Patient is requesting sleep study, reports that his wife states he has worsening snoring. Denies waking up in the middle of the night. Review of Systems   Constitutional:  Positive for fatigue. Negative for chills, diaphoresis and fever. HENT:  Positive for sinus pressure and sneezing. Negative for congestion, ear pain and sore throat. Respiratory:  Positive for cough. Negative for shortness of breath and wheezing. Cardiovascular:  Negative for chest pain, palpitations and leg swelling. Gastrointestinal:  Negative for nausea and vomiting. Genitourinary:  Negative for dysuria. Musculoskeletal:  Negative for myalgias, neck pain and neck stiffness. Neurological:  Positive for headaches. Negative for weakness. Prior to Visit Medications    Medication Sig Taking?  Authorizing Provider   omeprazole (PRILOSEC) 20 MG delayed release capsule TAKE 1 CAPSULE BY MOUTH IN THE MORNING BEFORE BREAKFAST Yes Beatrice Westbrook Davy Villegas MD   irbesartan (AVAPRO) 75 MG tablet Take 1 tablet by mouth daily Yes Kamlesh Bocanegra MD   atorvastatin (LIPITOR) 10 MG tablet Take 1 tablet by mouth daily Yes Kamlesh Bcoanegra MD   amLODIPine (NORVASC) 10 MG tablet TAKE 1 TABLET BY MOUTH DAILY FOR HIGH BLOOD PRESSURE Yes Kamlesh Bocanegra MD        Social History     Tobacco Use    Smoking status: Never    Smokeless tobacco: Never   Substance Use Topics    Alcohol use: No      Physical exam  BP (!) 147/88 (Site: Right Upper Arm, Position: Sitting, Cuff Size: Large Adult)   Pulse 73   Temp 97.4 °F (36.3 °C)   Ht 5' 6\" (1.676 m)   Wt 200 lb (90.7 kg)   SpO2 97%   BMI 32.28 kg/m²     Physical Exam  Constitutional:       General: He is not in acute distress. Appearance: Normal appearance. He is not ill-appearing. HENT:      Head: Normocephalic and atraumatic. Right Ear: Ear canal and external ear normal. There is impacted cerumen. Left Ear: Tympanic membrane, ear canal and external ear normal.      Nose: Nose normal. No congestion or rhinorrhea. Mouth/Throat:      Mouth: Mucous membranes are moist.      Pharynx: No oropharyngeal exudate or posterior oropharyngeal erythema. Eyes:      Extraocular Movements: Extraocular movements intact. Conjunctiva/sclera: Conjunctivae normal.      Pupils: Pupils are equal, round, and reactive to light. Cardiovascular:      Rate and Rhythm: Normal rate and regular rhythm. Pulses: Normal pulses. Heart sounds: Normal heart sounds. No murmur heard. Pulmonary:      Effort: Pulmonary effort is normal. No respiratory distress. Breath sounds: Normal breath sounds. No stridor. No wheezing, rhonchi or rales. Abdominal:      General: Abdomen is flat. Bowel sounds are normal. There is no distension. Palpations: Abdomen is soft. There is no mass. Tenderness: There is no abdominal tenderness. Musculoskeletal:         General: No swelling. Normal range of motion.       Cervical back: Normal range of motion. No tenderness. Right lower leg: No edema. Left lower leg: No edema. Lymphadenopathy:      Cervical: No cervical adenopathy. Skin:     General: Skin is warm and dry. Neurological:      General: No focal deficit present. Mental Status: He is alert and oriented to person, place, and time. Gait: Gait normal.   Psychiatric:         Mood and Affect: Mood normal.       ASSESSMENT/PLAN:  1. Acute non-recurrent sinusitis, unspecified location  Improving, with over-the-counter medications. Declines any medication at this time. Overall feeling better. Afebrile, normal lung exam.    -Advised pt to use OTC management and supportive therapy. - Increase daily water intake   - Can also use bedside humidifier. Avoid decongestants. Call or return if no improvement in 72 hrs, new or worsening symptoms, fever, chest pain, or worsening cough. Provided with handout of detailed instructions. 2. Snoring  Requesting order for sleep study  - Olga Adams MD, Sleep Medicine, [facility]    3. Essential hypertension  Slight elevated blood pressure may be secondary to taking decongestants. Patient is compliant with medications and blood pressures are well controlled at home. Return if symptoms worsen or fail to improve. An electronic signature was used to authenticate this note.     --Chelle Narayan MD on 10/13/2022 at 1:59 PM

## 2022-10-13 NOTE — TELEPHONE ENCOUNTER
LOV 7/21/2022    Future Appointments   Date Time Provider Ace Umaña   11/21/2022  7:30 AM MD RAFY Pereira

## 2022-10-13 NOTE — PATIENT INSTRUCTIONS
Use aggressive OTC management before we consider antibiotics. Use Flonase  nasal spray once daily along with nasal sinus rinses 2 to 4 times/day. Use expectorant (Guaifenesin 1200 mg bid). Increase daily water intake while using expectorant. Can also use bedside humidifier. Avoid decongestants. Call or return if no improvement in 72 hrs, new or worsening symptoms, fever, chest pain, or worsening cough. Provided with handout of detailed instructions.

## 2022-11-09 ENCOUNTER — TELEPHONE (OUTPATIENT)
Dept: PHARMACY | Facility: CLINIC | Age: 67
End: 2022-11-09

## 2022-11-09 NOTE — TELEPHONE ENCOUNTER
[facility] CLINICAL PHARMACY: ADHERENCE REVIEW  Identified care gap per [employer]: fills at [place] : ACE/ARB and Statin adherence    Last Visit: 10.13.22 (acute)        ASSESSMENT  ACE/ARB ADHERENCE    Insurance Records claims through 11.06.22 (Prior Year Panda Petera = PASSED; YTD Panda Strangeandra =  78%; Potential Fail Date: 11.11.22 ):   Irbesartan last filled on 07.15.22 for 90 day supply. Next refill due: 10.13.22    Per  [place] Portal:  (same as above). BP Readings from Last 3 Encounters:   10/13/22 (!) 147/88   07/21/22 122/80   03/28/22 138/88     CrCl cannot be calculated (Patient's most recent lab result is older than the maximum 180 days allowed. ). [id number] Records claims through 11.06.22 (Prior Year Panda Petera = PASSED; YTD Panda Strangeandra =  97%; Passed in 2022): Atorvastatin last filled on 10.13.22 for 90 day supply. Next refill due: 01.11.23    Per  [place] Portal:  (same as above). Lab Results   Component Value Date    CHOL 156 02/11/2022    TRIG 146 02/11/2022    HDL 36 (L) 02/11/2022    LDLCALC 91 02/11/2022    LDLDIRECT 106 (H) 10/22/2015     ALT   Date Value Ref Range Status   02/11/2022 16 10 - 40 U/L Final     AST   Date Value Ref Range Status   02/11/2022 16 15 - 37 U/L Final     The 10-year ASCVD risk score (Rick MARTÍNEZ, et al., 2019) is: 23.2%    Values used to calculate the score:      Age: 75 years      Sex: Male      Is Non- : No      Diabetic: No      Tobacco smoker: No      Systolic Blood Pressure: 156 mmHg      Is BP treated: Yes      HDL Cholesterol: 36 mg/dL      Total Cholesterol: 156 mg/dL     PLAN  The following are interventions that have been identified:   - Patient overdue refilling Irbesartan and active on home medication list.     Reached patient to review. - patient confirmed he is taking medication as directed without issues. He denies needing a refill at this time.     I called pharmacy to confirm patient recently picked up refill, and was unable to speak with a person.        Future Appointments   Date Time Provider Ace Umaña   11/21/2022  7:30 AM Gennie Cooks, MD [facility], [facility]  Phone: toll free [phone]        ===================================================================    For Pharmacy Admin Tracking Only    Gap Closed?: No   Time Spent (min): 15

## 2022-11-21 ENCOUNTER — OFFICE VISIT (OUTPATIENT)
Dept: FAMILY MEDICINE CLINIC | Age: 67
End: 2022-11-21
Payer: MEDICARE

## 2022-11-21 VITALS
RESPIRATION RATE: 16 BRPM | OXYGEN SATURATION: 96 % | DIASTOLIC BLOOD PRESSURE: 84 MMHG | TEMPERATURE: 97.2 F | SYSTOLIC BLOOD PRESSURE: 138 MMHG | HEART RATE: 74 BPM | BODY MASS INDEX: 31.96 KG/M2 | WEIGHT: 198 LBS

## 2022-11-21 DIAGNOSIS — E78.00 PURE HYPERCHOLESTEROLEMIA: ICD-10-CM

## 2022-11-21 DIAGNOSIS — K21.9 GASTROESOPHAGEAL REFLUX DISEASE WITHOUT ESOPHAGITIS: ICD-10-CM

## 2022-11-21 DIAGNOSIS — I10 HYPERTENSION, UNSPECIFIED TYPE: Primary | ICD-10-CM

## 2022-11-21 DIAGNOSIS — R06.83 SNORING: ICD-10-CM

## 2022-11-21 DIAGNOSIS — Z12.11 ENCOUNTER FOR FIT (FECAL IMMUNOCHEMICAL TEST) SCREENING: ICD-10-CM

## 2022-11-21 DIAGNOSIS — I10 HYPERTENSION, UNSPECIFIED TYPE: ICD-10-CM

## 2022-11-21 LAB
ANION GAP SERPL CALCULATED.3IONS-SCNC: 12 MMOL/L (ref 3–16)
BUN BLDV-MCNC: 11 MG/DL (ref 7–20)
CALCIUM SERPL-MCNC: 9.6 MG/DL (ref 8.3–10.6)
CHLORIDE BLD-SCNC: 105 MMOL/L (ref 99–110)
CO2: 26 MMOL/L (ref 21–32)
CONTROL: NORMAL
CREAT SERPL-MCNC: 0.9 MG/DL (ref 0.8–1.3)
GFR SERPL CREATININE-BSD FRML MDRD: >60 ML/MIN/{1.73_M2}
GLUCOSE BLD-MCNC: 107 MG/DL (ref 70–99)
HEMOCCULT STL QL: NORMAL
POTASSIUM SERPL-SCNC: 5.3 MMOL/L (ref 3.5–5.1)
SODIUM BLD-SCNC: 143 MMOL/L (ref 136–145)

## 2022-11-21 PROCEDURE — 3074F SYST BP LT 130 MM HG: CPT | Performed by: INTERNAL MEDICINE

## 2022-11-21 PROCEDURE — 82274 ASSAY TEST FOR BLOOD FECAL: CPT | Performed by: INTERNAL MEDICINE

## 2022-11-21 PROCEDURE — 3078F DIAST BP <80 MM HG: CPT | Performed by: INTERNAL MEDICINE

## 2022-11-21 PROCEDURE — 99214 OFFICE O/P EST MOD 30 MIN: CPT | Performed by: INTERNAL MEDICINE

## 2022-11-21 PROCEDURE — 1123F ACP DISCUSS/DSCN MKR DOCD: CPT | Performed by: INTERNAL MEDICINE

## 2022-11-21 ASSESSMENT — PATIENT HEALTH QUESTIONNAIRE - PHQ9
SUM OF ALL RESPONSES TO PHQ QUESTIONS 1-9: 0
SUM OF ALL RESPONSES TO PHQ QUESTIONS 1-9: 0
1. LITTLE INTEREST OR PLEASURE IN DOING THINGS: 0
SUM OF ALL RESPONSES TO PHQ QUESTIONS 1-9: 0
SUM OF ALL RESPONSES TO PHQ9 QUESTIONS 1 & 2: 0
SUM OF ALL RESPONSES TO PHQ QUESTIONS 1-9: 0
2. FEELING DOWN, DEPRESSED OR HOPELESS: 0

## 2022-11-21 ASSESSMENT — ENCOUNTER SYMPTOMS: COUGH: 0

## 2022-11-21 NOTE — PROGRESS NOTES
Derek De Oliveira II (:  1955) is a 75 y.o. male,Established patient, here for evaluation of the following chief complaint(s):  Hypertension and Hyperlipidemia (Fasting today)         ASSESSMENT/PLAN:  1. Encounter for FIT (fecal immunochemical test) screening    - POCT Fecal Immunochemical Test (FIT)    2. Hypertension, unspecified type  -continue med    3. Snoring  -will get sleep study    4. Gastroesophageal reflux disease without esophagitis  -on omeprazole    5. Pure hypercholesterolemia  -on atorvastatin    Return in about 4 months (around 3/21/2023). Current Outpatient Medications   Medication Sig Dispense Refill    irbesartan (AVAPRO) 75 MG tablet TAKE 1 TABLET BY MOUTH EVERY DAY 90 tablet 0    omeprazole (PRILOSEC) 20 MG delayed release capsule TAKE 1 CAPSULE BY MOUTH IN THE MORNING BEFORE BREAKFAST 90 capsule 2    atorvastatin (LIPITOR) 10 MG tablet Take 1 tablet by mouth daily 90 tablet 3    amLODIPine (NORVASC) 10 MG tablet TAKE 1 TABLET BY MOUTH DAILY FOR HIGH BLOOD PRESSURE 90 tablet 1     No current facility-administered medications for this visit.          Subjective   SUBJECTIVE/OBJECTIVE:  HPI  CC- htn- on irbesartan 75 mg and amlodipine 10 mg which controls his blood pressure    Chemistry        Component Value Date/Time     2022 1157    K 4.3 2022 1157     2022 1157    CO2 22 2022 1157    BUN 13 2022 1157    CREATININE 0.7 (L) 2022 1157        Component Value Date/Time    CALCIUM 8.8 2022 1157    ALKPHOS 78 2022 1157    AST 16 2022 1157    ALT 16 2022 1157    BILITOT 0.6 2022 1157        Hyperlipidemia- on atorvastatin 10 mg , no myalgia  Lab Results   Component Value Date    CHOL 156 2022    CHOL 155 09/15/2020    CHOL 151 10/23/2019     Lab Results   Component Value Date    TRIG 146 2022    TRIG 150 09/15/2020    TRIG 149 10/23/2019     Lab Results   Component Value Date    HDL 36 (L) 02/11/2022    HDL 37 (L) 09/15/2020    HDL 38 (L) 10/23/2019     Lab Results   Component Value Date    LDLCALC 91 02/11/2022    [address] 88 09/15/2020    LDLCALC 83 10/23/2019     Takes  omeprazole  for GERD. Snores at night, getting sleep study    Review of Systems   Constitutional:  Negative for activity change. HENT:  Negative for congestion. Respiratory:  Negative for cough. Cardiovascular:  Negative for chest pain. Gastrointestinal:         [place]   Endocrine: Negative. Genitourinary: Negative. Musculoskeletal:  Positive for arthralgias. Allergic/Immunologic: Negative for environmental allergies. Psychiatric/Behavioral:  Negative for sleep disturbance. Objective   Physical Exam  Vitals and nursing note reviewed. Constitutional:       Appearance: He is well-developed. HENT:      Head: Normocephalic. Nose: Nose normal.   Cardiovascular:      Rate and Rhythm: Normal rate and regular rhythm. Pulmonary:      Effort: Pulmonary effort is normal.      Breath sounds: No rales. Abdominal:      Palpations: Abdomen is soft. Musculoskeletal:         General: Normal range of motion. Cervical back: Normal range of motion and neck supple. Skin:     General: Skin is warm and dry. Neurological:      General: No focal deficit present. Psychiatric:         Mood and Affect: Mood normal.         Behavior: Behavior normal.        On this date 11/21/2022 I have spent 30 minutes reviewing previous notes, test results and face to face with the patient discussing the diagnosis and importance of compliance with the treatment plan as well as documenting on the day of the visit. An electronic signature was used to authenticate this note.     --Frankie Holt MD

## 2022-12-19 ENCOUNTER — OFFICE VISIT (OUTPATIENT)
Dept: SLEEP MEDICINE | Age: 67
End: 2022-12-19
Payer: MEDICARE

## 2022-12-19 VITALS
TEMPERATURE: 97.7 F | OXYGEN SATURATION: 97 % | BODY MASS INDEX: 33.79 KG/M2 | WEIGHT: 202.8 LBS | SYSTOLIC BLOOD PRESSURE: 130 MMHG | HEIGHT: 65 IN | RESPIRATION RATE: 18 BRPM | DIASTOLIC BLOOD PRESSURE: 80 MMHG | HEART RATE: 87 BPM

## 2022-12-19 DIAGNOSIS — G47.19 EXCESSIVE DAYTIME SLEEPINESS: ICD-10-CM

## 2022-12-19 DIAGNOSIS — R06.83 SNORING: ICD-10-CM

## 2022-12-19 DIAGNOSIS — E66.9 OBESITY (BMI 30.0-34.9): ICD-10-CM

## 2022-12-19 DIAGNOSIS — G47.33 OSA (OBSTRUCTIVE SLEEP APNEA): Primary | ICD-10-CM

## 2022-12-19 DIAGNOSIS — R06.81 WITNESSED EPISODE OF APNEA: ICD-10-CM

## 2022-12-19 DIAGNOSIS — I10 PRIMARY HYPERTENSION: ICD-10-CM

## 2022-12-19 PROCEDURE — 1123F ACP DISCUSS/DSCN MKR DOCD: CPT | Performed by: PSYCHIATRY & NEUROLOGY

## 2022-12-19 PROCEDURE — 99204 OFFICE O/P NEW MOD 45 MIN: CPT | Performed by: PSYCHIATRY & NEUROLOGY

## 2022-12-19 PROCEDURE — 3078F DIAST BP <80 MM HG: CPT | Performed by: PSYCHIATRY & NEUROLOGY

## 2022-12-19 PROCEDURE — 3074F SYST BP LT 130 MM HG: CPT | Performed by: PSYCHIATRY & NEUROLOGY

## 2022-12-19 ASSESSMENT — SLEEP AND FATIGUE QUESTIONNAIRES
HOW LIKELY ARE YOU TO NOD OFF OR FALL ASLEEP WHEN YOU ARE A PASSENGER IN A CAR FOR AN HOUR WITHOUT A BREAK: 0
NECK CIRCUMFERENCE (INCHES): 16.5
ESS TOTAL SCORE: 2
HOW LIKELY ARE YOU TO NOD OFF OR FALL ASLEEP WHILE SITTING INACTIVE IN A PUBLIC PLACE: 0
HOW LIKELY ARE YOU TO NOD OFF OR FALL ASLEEP WHILE LYING DOWN TO REST IN THE AFTERNOON WHEN CIRCUMSTANCES PERMIT: 1
HOW LIKELY ARE YOU TO NOD OFF OR FALL ASLEEP WHILE SITTING AND TALKING TO SOMEONE: 0
HOW LIKELY ARE YOU TO NOD OFF OR FALL ASLEEP WHILE SITTING QUIETLY AFTER LUNCH WITHOUT ALCOHOL: 0
HOW LIKELY ARE YOU TO NOD OFF OR FALL ASLEEP WHILE WATCHING TV: 1
HOW LIKELY ARE YOU TO NOD OFF OR FALL ASLEEP IN A CAR, WHILE STOPPED FOR A FEW MINUTES IN TRAFFIC: 0
HOW LIKELY ARE YOU TO NOD OFF OR FALL ASLEEP WHILE SITTING AND READING: 0

## 2022-12-19 ASSESSMENT — ENCOUNTER SYMPTOMS
APNEA: 1
CHOKING: 1
EYES NEGATIVE: 1
ALLERGIC/IMMUNOLOGIC NEGATIVE: 1
GASTROINTESTINAL NEGATIVE: 1

## 2022-12-19 NOTE — PROGRESS NOTES
MD ISRAEL Dumont Board Certified in Sleep Medicine  Certified [place] [facility]  Board Certified in Neurology Yahir Roblesbreann IslasKumareugene Medley [address] [facility] 232 (2209 [facility], [address]           [address] [facility] SLEEP MEDICINE [address] [address] [phone]    Subjective:     Patient ID: Jesús Hicks II is a 75 y.o. male. Chief Complaint   Patient presents with    Establish Care    Snoring       HPI:        Jesús Hicks II is a 75 y.o. male referred by Dr. Su Briones for a sleep evaluation. He complains of snoring, snorting, choking, periods of not breathing, tossing and turning, excessive daytime sleepiness, feels sleepy during the day, take naps during the day but he denies knees buckling with laughing, completely or partially paralyzed while falling asleep or waking up, noisy environment, uncomfortable room temperature, uncomfortable bedding. Symptoms began several years ago, gradually worsening since that time. The patient's bed-partner confirmed the snoring and stopped breathing at night  SLEEP SCHEDULE: Goes to bed around 8 PM in the weekdays and 11 PM in the weekends. It usually takes the patient 5 minutes to fall asleep. The patient gets up 1 per night to go to the bathroom. The Patient finally gets up at 4 AM during the weekdays and 9 AM in the weekends. patient wakes up with dry mouth. The patient has restless sleep with frequent arousals in addition to the Patient has significant daytime sleepiness. The Patient scored [place] Sleepiness Score: 2 on [place] Sleepiness Scale ( more than 10 is indicative of daytime sleepiness)and 9 in fatigue scale ( more than 36 is indicative of daytime fatigue). The patient takes daily nap for 30 minutes and usually is not refreshing nap. Previous evaluation and treatment has included- none. The Patient has been obese for many years and tried, has gained few in the last 5 years,      DOT/CDL - N/A  ALBERT/Becca - N/A  The patient HTN is stable. Previous Report(s) Reviewed: historical medical records       Social History     Socioeconomic History    Marital status:      Spouse name: Not on file    Number of children: Not on file    Years of education: Not on file    Highest education level: Not on file   Occupational History    Not on file   Tobacco Use    Smoking status: Never     Passive exposure: Past    Smokeless tobacco: Never   Vaping Use    Vaping Use: Never used   Substance and Sexual Activity    Alcohol use: No    Drug use: No    Sexual activity: Not on file   Other Topics Concern    Not on file   Social History Narrative    Not on file     Social Determinants of Health     Financial Resource Strain: Low Risk     Difficulty of Paying Living Expenses: Not hard at all   Food Insecurity: No Food Insecurity    Worried About Running Out of Food in the Last Year: Never true    Ran Out of Food in the Last Year: Never true   Transportation Needs: Not on file   Physical Activity: Not on file   Stress: Not on file   Social Connections: Not on file   Intimate Partner Violence: Not on file   Housing Stability: Not on file       Prior to Admission medications    Medication Sig Start Date End Date Taking?  Authorizing Provider   irbesartan (AVAPRO) 75 MG tablet TAKE 1 TABLET BY MOUTH EVERY DAY 10/13/22  Yes Pastor Cesar MD   omeprazole (PRILOSEC) 20 MG delayed release capsule TAKE 1 CAPSULE BY MOUTH IN THE MORNING BEFORE BREAKFAST 7/15/22  Yes Pastor Cesar MD   atorvastatin (LIPITOR) 10 MG tablet Take 1 tablet by mouth daily 4/29/22  Yes Pastor Cesar MD   amLODIPine (NORVASC) 10 MG tablet TAKE 1 TABLET BY MOUTH DAILY FOR HIGH BLOOD PRESSURE 2/11/22  Yes Pastor Cesar MD       Allergies as of 12/19/2022 - Fully Reviewed 12/19/2022   Allergen Reaction Noted    Azithromycin Other (See Comments) and Nausea Only 03/11/2015    Guaifenesin er Other (See Comments) 01/21/2010    Erythromycin Nausea And Vomiting 01/23/2014    Vicodin [hydrocodone-acetaminophen] Nausea And Vomiting 04/01/2010       Patient Active Problem List   Diagnosis    Hypertension    Hyperlipidemia    Insomnia    Allergic rhinitis    Scrotal hernia    Recurrent inguinal hernia of left side without obstruction or gangrene       Past Medical History:   Diagnosis Date    Hyperlipidemia     Hypertension     Recurrent left inguinal hernia     S/P colonoscopy 8/1/2008    Scrotal hernia        Past Surgical History:   Procedure Laterality Date    COLONOSCOPY      ENDOSCOPY, COLON, DIAGNOSTIC      HERNIA REPAIR Left 09/27/2017    ATTEMPTED DAVINCI, CONVERTED OPEN RECURRENT LEFT INGUINAL hernia repair with mesh    INGUINAL HERNIA REPAIR  08/09/2017    DAVINCI LEFT INGUINAL HERNIA REPAIR WITH MESH    INGUINAL HERNIA REPAIR  08/09/2017    DAVINCI LEFT INGUINAL HERNIA REPAIR WITH MESH    TONSILLECTOMY         Family History   Problem Relation Age of Onset    Cancer Mother     High Blood Pressure Mother     High Blood Pressure Sister     High Cholesterol Sister        Review of Systems   Constitutional:  Positive for diaphoresis. HENT: Negative. Eyes: Negative. Respiratory:  Positive for apnea and choking. Cardiovascular: Negative. Gastrointestinal: Negative. Endocrine: Negative. Genitourinary:  Positive for frequency. Musculoskeletal: Negative. Allergic/Immunologic: Negative. Neurological: Negative. Hematological: Negative. Psychiatric/Behavioral: Negative. Objective:     Vitals:  Weight BMI Neck circumference    Wt Readings from Last 3 Encounters:   12/19/22 202 lb 12.8 oz (92 kg)   11/21/22 198 lb (89.8 kg)   10/13/22 200 lb (90.7 kg)    Body mass index is 33.75 kg/m².  Neck circumference (Inches): 16.5     BP HR SaO2   BP Readings from Last 3 Encounters:   12/19/22 130/80   11/21/22 138/84   10/13/22 (!) 147/88    Pulse Readings from Last 3 Encounters:   12/19/22 87   11/21/22 74   10/13/22 73    SpO2 Readings from Last 3 Encounters:   12/19/22 97%   11/21/22 96%   10/13/22 97%        The mandibular molar Class :   [x]1 []2 []3      Mallampati I Airway Classification:   []1 []2 []3 [x]4        Physical Exam  Vitals and nursing note reviewed. Constitutional:       Appearance: Normal appearance. HENT:      Head: Atraumatic. Nose: Nose normal. No congestion. Mouth/Throat:      Comments: Mallampati class 4, no retrognathia or hypognathia , normal airflow in bilateral nostrils, no septum deviation , crowded oropharynx with low soft palate, no tonsils enlargement. Eyes:      Extraocular Movements: Extraocular movements intact. Cardiovascular:      Rate and Rhythm: Normal rate and regular rhythm. Pulmonary:      Effort: Pulmonary effort is normal.      Breath sounds: Normal breath sounds. Musculoskeletal:      Right lower leg: No edema. Left lower leg: No edema. Neurological:      Mental Status: Mental status is at baseline. Psychiatric:         Mood and Affect: Mood normal.       Assessment:    Obstructive sleep apnea especially with snoring, snorting,  observed apnea, daytime sleepiness, large neck circumference, Mallampati class of 4 and obesity. Diagnosis Orders   1. SABINO (obstructive sleep apnea)  Home Sleep Study    Sleep Study with PAP Titration      2. Primary hypertension        3. Snoring        4. Witnessed episode of apnea        5. Excessive daytime sleepiness        6. Obesity (BMI 30.0-34. 9)          Plan:     Patient was counseled about the pathophysiology of obstructive sleep apnea syndrome and the methods for evaluating its presence and severity. Patient was counseled to avoid driving and other potentially hazardous circumstances if the patient is experiencing excessive sleepiness.   Treatment considerations include the use of nasal CPAP, oral dental appliance or a surgical intervention, which should be based on otolarygologic findings, In the meantime, the patient should be cautioned to avoid the use of alcohol or other depressant medications because of potential for increasing the duration and severity of apnea and cautioned regarding driving or operating and dangerous equipment if the patient is experiencing daytime sleepiness. .  Most likely treating the SABINO will have positive impact on HTN control. The proportionality between weight and AHI. With 10% weight change, the AHI has a 27% proportionate change. With 20% weight change, the AHI has a 45-50% proportionate change. Orders Placed This Encounter   Procedures    Home Sleep Study    Sleep Study with PAP Titration         Return for F/U between 31 and 90 days from the recieving CPAP.     Christiano Guerrero MD  Medical Director - [facility]

## 2022-12-19 NOTE — PATIENT INSTRUCTIONS
Orders Placed This Encounter   Procedures    Home Sleep Study     Standing Status:   Future     Standing Expiration Date:   12/19/2023     Order Specific Question:   Location For Sleep Study     Answer:   [facility]     Order Specific Question:   Select Sleep Lab Location     Answer:   [facility]    Sleep Study with PAP Titration     Standing Status:   Future     Standing Expiration Date:   12/19/2023     Order Specific Question:   Sleep Study Titration Type     Answer:   CPAP     Order Specific Question:   Location For Sleep Study     Answer:   [facility]     Order Specific Question:   Select Sleep Lab Location     Answer:   [facility]

## 2023-01-04 ENCOUNTER — HOSPITAL ENCOUNTER (OUTPATIENT)
Dept: SLEEP CENTER | Age: 68
Discharge: HOME | End: 2023-01-04
Payer: MEDICARE

## 2023-01-04 DIAGNOSIS — G47.33 OSA (OBSTRUCTIVE SLEEP APNEA): ICD-10-CM

## 2023-01-04 PROCEDURE — 95806 SLEEP STUDY UNATT&RESP EFFT: CPT

## 2023-01-09 PROBLEM — G47.33 OSA (OBSTRUCTIVE SLEEP APNEA): Status: ACTIVE | Noted: 2023-01-09

## 2023-01-10 ENCOUNTER — TELEPHONE (OUTPATIENT)
Dept: PULMONOLOGY | Age: 68
End: 2023-01-10

## 2023-01-11 DIAGNOSIS — I10 ESSENTIAL HYPERTENSION: ICD-10-CM

## 2023-01-11 RX ORDER — AMLODIPINE BESYLATE 10 MG/1
TABLET ORAL
Qty: 90 TABLET | Refills: 0 | Status: SHIPPED | OUTPATIENT
Start: 2023-01-11

## 2023-01-11 NOTE — TELEPHONE ENCOUNTER
11/21/2022    Future Appointments   Date Time Provider Ace Chasidy   1/26/2023  8:30 PM JANICE Snyder SLEEP ROOM [address] [facility] elene [facility]   3/20/2023  8:30 AM MD RAFY Sharma

## 2023-01-26 ENCOUNTER — HOSPITAL ENCOUNTER (OUTPATIENT)
Dept: SLEEP CENTER | Age: 68
Discharge: HOME | End: 2023-01-26
Payer: MEDICARE

## 2023-01-26 DIAGNOSIS — G47.33 OSA (OBSTRUCTIVE SLEEP APNEA): ICD-10-CM

## 2023-01-26 PROCEDURE — 95811 POLYSOM 6/>YRS CPAP 4/> PARM: CPT

## 2023-01-30 PROBLEM — G47.39 TREATMENT-EMERGENT CENTRAL SLEEP APNEA: Status: ACTIVE | Noted: 2023-01-30

## 2023-01-31 ENCOUNTER — TELEPHONE (OUTPATIENT)
Dept: PULMONOLOGY | Age: 68
End: 2023-01-31

## 2023-01-31 NOTE — PROGRESS NOTES
Racheal Boone II         : 1955  [] [address]     [] [address]      [] [facility]     [][facility]    [] [facility]  [] [facility]  [] [facility]   [] [facility] [] Other:  Diagnosis: [x] SABINO (G47.33) [x] CSA (G47.31) [] Apnea (G47.30)   Length of Need: [] 12 Months [x] 99 Months [] Other:    Machine (SOLOMON!):  [x] ResMed AirSense     Auto [] Other:     []  CPAP () [] Bilevel ()   Mode: [] Auto [] Spontaneous    Mode: [] Auto [] Spontaneous                       20/16 cm     Comfort Settings:   - Ramp Pressure: 5 cmH2O                                        - Ramp time: 15 min                                     -  Flex/EPR - 3 full time                                    - For ResMed Bilevel (TiMax-4 sec   TiMin- 0.2 sec)     Humidifier: [x] Heated ()        [x] Water chamber replacement ()/ 1 per 6 months        Mask:  Please always start with the mask the patient used during the titraion   [x] Nasal () /1 per 3 months [x] Full Face () /1 per 3 months   [x] Patient choice -Size and fit mask [x] Patient Choice - Size and fit mask   [] Dispense:  [] Dispense: Vitera small   [x] Headgear () / 1 per 3 months [x] Headgear () / 1 per 3 months   [x] Replacement Nasal Cushion ()/2 per month [x] Interface Replacement ()/1 per month   [x] Replacement Nasal Pillows ()/2 per month         Tubing: [x] Heated ()/1 per 3 months    [] Standard ()/1 per 3 months [] Other:           Filters: [x] Non-disposable ()/1 per 6 months     [x] Ultra-Fine, Disposable ()/2 per month        Miscellaneous: [x] Chin Strap ()/ 1 per 6 months [] O2 bleed-in:       LPM   [] Oximetry on CPAP/Bilevel []  Other:          Start Order Date: 23    MEDICAL JUSTIFICATION:  I, the undersigned, certify that the above prescribed supplies are medically necessary for this patients wellbeing.   In my opinion, the supplies are both reasonable and necessary in reference to accepted standards of medicalpractice in treatment of this patients condition.     Madison Daley MD      NPI: [id number]       Order Signed Date: 01/31/23    Electronically signed by Madison Daley MD on 1/31/2023 at 9:29 AM

## 2023-01-31 NOTE — TELEPHONE ENCOUNTER
PSG with BiPAP Sleep study showed history moderate SABINO. AHI was 25.2  per hr. And O2 Desaturations to 78%. Central Apnea 15.6/hr. Adequate control of events at a BiPAP pressure of 20/16 cm    Dr Ashley Robles: Follow up with the patient's sleep physician to discuss results  BiPAP pressure of 20/16 cm  Avoid sedatives, alcohol and caffeinated drinks at bedtime. Avoid driving while sleepy. The patient has been notified of this information and all questions answered.   DME:  [facility] faxed

## 2023-03-20 ENCOUNTER — OFFICE VISIT (OUTPATIENT)
Dept: FAMILY MEDICINE CLINIC | Age: 68
End: 2023-03-20

## 2023-03-20 VITALS
TEMPERATURE: 96.9 F | SYSTOLIC BLOOD PRESSURE: 136 MMHG | DIASTOLIC BLOOD PRESSURE: 80 MMHG | BODY MASS INDEX: 33.09 KG/M2 | RESPIRATION RATE: 16 BRPM | OXYGEN SATURATION: 95 % | HEIGHT: 65 IN | HEART RATE: 83 BPM | WEIGHT: 198.6 LBS

## 2023-03-20 DIAGNOSIS — Z12.11 SCREENING FOR COLON CANCER: ICD-10-CM

## 2023-03-20 DIAGNOSIS — G47.33 OSA (OBSTRUCTIVE SLEEP APNEA): ICD-10-CM

## 2023-03-20 DIAGNOSIS — E78.00 PURE HYPERCHOLESTEROLEMIA: ICD-10-CM

## 2023-03-20 DIAGNOSIS — I10 ESSENTIAL HYPERTENSION: Primary | ICD-10-CM

## 2023-03-20 DIAGNOSIS — K21.9 GASTROESOPHAGEAL REFLUX DISEASE WITHOUT ESOPHAGITIS: ICD-10-CM

## 2023-03-20 RX ORDER — FLUTICASONE PROPIONATE 50 MCG
SPRAY, SUSPENSION (ML) NASAL
COMMUNITY
Start: 2023-02-10

## 2023-03-20 SDOH — ECONOMIC STABILITY: FOOD INSECURITY: WITHIN THE PAST 12 MONTHS, THE FOOD YOU BOUGHT JUST DIDN'T LAST AND YOU DIDN'T HAVE MONEY TO GET MORE.: NEVER TRUE

## 2023-03-20 SDOH — ECONOMIC STABILITY: HOUSING INSECURITY
IN THE LAST 12 MONTHS, WAS THERE A TIME WHEN YOU DID NOT HAVE A STEADY PLACE TO SLEEP OR SLEPT IN A SHELTER (INCLUDING NOW)?: NO

## 2023-03-20 SDOH — ECONOMIC STABILITY: INCOME INSECURITY: HOW HARD IS IT FOR YOU TO PAY FOR THE VERY BASICS LIKE FOOD, HOUSING, MEDICAL CARE, AND HEATING?: NOT HARD AT ALL

## 2023-03-20 SDOH — ECONOMIC STABILITY: FOOD INSECURITY: WITHIN THE PAST 12 MONTHS, YOU WORRIED THAT YOUR FOOD WOULD RUN OUT BEFORE YOU GOT MONEY TO BUY MORE.: NEVER TRUE

## 2023-03-20 ASSESSMENT — ENCOUNTER SYMPTOMS: COUGH: 0

## 2023-03-20 ASSESSMENT — PATIENT HEALTH QUESTIONNAIRE - PHQ9
2. FEELING DOWN, DEPRESSED OR HOPELESS: 0
SUM OF ALL RESPONSES TO PHQ QUESTIONS 1-9: 0
SUM OF ALL RESPONSES TO PHQ QUESTIONS 1-9: 0
SUM OF ALL RESPONSES TO PHQ9 QUESTIONS 1 & 2: 0
1. LITTLE INTEREST OR PLEASURE IN DOING THINGS: 0
SUM OF ALL RESPONSES TO PHQ QUESTIONS 1-9: 0
SUM OF ALL RESPONSES TO PHQ QUESTIONS 1-9: 0

## 2023-03-20 NOTE — PROGRESS NOTES
Danielle Corrales II (:  1955) is a 75 y.o. male,Established patient, here for evaluation of the following chief complaint(s):  Hypertension  Gerd  SABINO  HLD     ASSESSMENT/PLAN:  1. Essential hypertension  -on irbesartan  and amlodipine  2. Gastroesophageal reflux disease without esophagitis  -on omeprazole  3. Pure hypercholesterolemia  -on statin  4. SABINO (obstructive sleep apnea)  -on Cpap  5. Screening for colon cancer  -refer [facility]    Follow up 2023 for wellness     Current Outpatient Medications   Medication Sig Dispense Refill    fluticasone (FLONASE) 50 MCG/ACT nasal spray INHALE 2 SPRAYS IN EACH NOSTRIL EVERY DAY FOR 30 DAYS      amLODIPine (NORVASC) 10 MG tablet TAKE 1 TABLET BY MOUTH EVERY DAY FOR high BLOOD PRESSURE 90 tablet 0    irbesartan (AVAPRO) 75 MG tablet TAKE 1 TABLET BY MOUTH EVERY DAY 90 tablet 0    omeprazole (PRILOSEC) 20 MG delayed release capsule TAKE 1 CAPSULE BY MOUTH IN THE MORNING BEFORE BREAKFAST 90 capsule 2    atorvastatin (LIPITOR) 10 MG tablet Take 1 tablet by mouth daily 90 tablet 3     No current facility-administered medications for this visit. Subjective   SUBJECTIVE/OBJECTIVE:  HPI  CC- Hypertension- on amlodipine 10 mg  and irbesartan 75 mg daily. No chest pain or shortness of breath. Chemistry        Component Value Date/Time     2022 0805    K 5.3 (H) 2022 0805     2022 0805    CO2 26 2022 0805    BUN 11 2022 0805    CREATININE 0.9 2022 0805        Component Value Date/Time    CALCIUM 9.6 2022 0805    ALKPHOS 78 2022 1157    AST 16 2022 1157    ALT 16 2022 1157    BILITOT 0.6 2022 1157         Hyperlipidemia- on atorvastatin 10 mg daily.  No myalgia  Lab Results   Component Value Date    CHOL 156 2022    CHOL 155 09/15/2020    CHOL 151 10/23/2019     Lab Results   Component Value Date    TRIG 146 2022    TRIG 150 09/15/2020    TRIG 149 10/23/2019     Lab

## 2023-04-18 ENCOUNTER — OFFICE VISIT (OUTPATIENT)
Dept: SLEEP MEDICINE | Age: 68
End: 2023-04-18
Payer: MEDICARE

## 2023-04-18 VITALS
DIASTOLIC BLOOD PRESSURE: 80 MMHG | HEIGHT: 65 IN | TEMPERATURE: 97.9 F | RESPIRATION RATE: 18 BRPM | WEIGHT: 202.8 LBS | OXYGEN SATURATION: 95 % | HEART RATE: 91 BPM | BODY MASS INDEX: 33.79 KG/M2 | SYSTOLIC BLOOD PRESSURE: 120 MMHG

## 2023-04-18 DIAGNOSIS — I10 PRIMARY HYPERTENSION: ICD-10-CM

## 2023-04-18 DIAGNOSIS — G47.33 OSA TREATED WITH BIPAP: Primary | ICD-10-CM

## 2023-04-18 DIAGNOSIS — E66.9 OBESITY (BMI 30.0-34.9): ICD-10-CM

## 2023-04-18 DIAGNOSIS — Z99.89 DEPENDENCE ON OTHER ENABLING MACHINES AND DEVICES: ICD-10-CM

## 2023-04-18 PROCEDURE — 3074F SYST BP LT 130 MM HG: CPT | Performed by: PSYCHIATRY & NEUROLOGY

## 2023-04-18 PROCEDURE — 99214 OFFICE O/P EST MOD 30 MIN: CPT | Performed by: PSYCHIATRY & NEUROLOGY

## 2023-04-18 PROCEDURE — 3079F DIAST BP 80-89 MM HG: CPT | Performed by: PSYCHIATRY & NEUROLOGY

## 2023-04-18 PROCEDURE — 1123F ACP DISCUSS/DSCN MKR DOCD: CPT | Performed by: PSYCHIATRY & NEUROLOGY

## 2023-04-18 ASSESSMENT — SLEEP AND FATIGUE QUESTIONNAIRES
HOW LIKELY ARE YOU TO NOD OFF OR FALL ASLEEP WHILE SITTING QUIETLY AFTER LUNCH WITHOUT ALCOHOL: 0
HOW LIKELY ARE YOU TO NOD OFF OR FALL ASLEEP WHILE WATCHING TV: 0
HOW LIKELY ARE YOU TO NOD OFF OR FALL ASLEEP WHILE SITTING AND TALKING TO SOMEONE: 0
HOW LIKELY ARE YOU TO NOD OFF OR FALL ASLEEP IN A CAR, WHILE STOPPED FOR A FEW MINUTES IN TRAFFIC: 0
HOW LIKELY ARE YOU TO NOD OFF OR FALL ASLEEP WHILE SITTING INACTIVE IN A PUBLIC PLACE: 0
HOW LIKELY ARE YOU TO NOD OFF OR FALL ASLEEP WHILE SITTING AND READING: 0
ESS TOTAL SCORE: 0
HOW LIKELY ARE YOU TO NOD OFF OR FALL ASLEEP WHEN YOU ARE A PASSENGER IN A CAR FOR AN HOUR WITHOUT A BREAK: 0
HOW LIKELY ARE YOU TO NOD OFF OR FALL ASLEEP WHILE LYING DOWN TO REST IN THE AFTERNOON WHEN CIRCUMSTANCES PERMIT: 0

## 2023-04-18 NOTE — PROGRESS NOTES
MD ISRAEL Martinez Board Certified in Sleep Medicine  Certified in [address] [place] [address] Certified in Neurology Yahir Unique Medley [address] [place], ITZ [address]  [phone]   [address] [place] [address]                    22[address] [facility]  [address] [address] [phone]    Subjective:     Patient ID: Mary Lou Payne II is a 75 y.o. male. Chief Complaint   Patient presents with    Follow-up    Sleep Apnea       HPI:        Benedict Hogan is a 75 y.o. male was seen today as a follow for obstructive sleep apnea. The patient underwent home sleep testing on 01/04/2023, the overnight registration revealed severe obstructive sleep apnea with apnea hypopnea index of 25.2/hr with lowest O2 saturation of 78%, patient spent about 14.5 minutes below 90% (weight was 202 pounds). Subsequently, the patient underwent successful PAP titration on 01/26/2023, the lowest O2 saturation while on PAP was 95%. Patient is using the PAP machine about 100% of the time, more than 4 hours a nightabout  93 %, in total average of 6:26 hours a night in last 45 days. Currently on PAP at 20/16 cm (), the AHI is  9.7 events per hour at this pressure. Patient improved regarding daytime sleepiness and fatigue, wakes up refreshed in the morning. The Patient scored [place] Sleepiness Score: 0 on [place] Sleepiness Scale ( more than 10 is indicative of daytime sleepiness)   Patient has no problem with PAP pressure or mask, uses FFM. Wakes up in the morning with dry mouth, the setting of the heated humidifier at # auto. BP is stable. Has not gained weight  since last visit.  202  DOT/CDL - N/A        Previous Report(s)Reviewed: historical medical records         Social History     Socioeconomic History    Marital status:

## 2023-05-19 ENCOUNTER — ANESTHESIA EVENT (OUTPATIENT)
Dept: ENDOSCOPY | Age: 68
End: 2023-05-19
Payer: MEDICARE

## 2023-05-22 ENCOUNTER — HOSPITAL ENCOUNTER (OUTPATIENT)
Age: 68
Setting detail: OUTPATIENT SURGERY
Discharge: HOME | End: 2023-05-22
Attending: INTERNAL MEDICINE | Admitting: INTERNAL MEDICINE
Payer: MEDICARE

## 2023-05-22 ENCOUNTER — ANESTHESIA (OUTPATIENT)
Dept: ENDOSCOPY | Age: 68
End: 2023-05-22
Payer: MEDICARE

## 2023-05-22 VITALS
DIASTOLIC BLOOD PRESSURE: 80 MMHG | SYSTOLIC BLOOD PRESSURE: 161 MMHG | HEIGHT: 65 IN | RESPIRATION RATE: 18 BRPM | WEIGHT: 189 LBS | OXYGEN SATURATION: 98 % | HEART RATE: 60 BPM | BODY MASS INDEX: 31.49 KG/M2 | TEMPERATURE: 97.2 F

## 2023-05-22 DIAGNOSIS — Z12.11 SCREENING FOR COLON CANCER: ICD-10-CM

## 2023-05-22 PROCEDURE — 88305 TISSUE EXAM BY PATHOLOGIST: CPT

## 2023-05-22 PROCEDURE — 3700000000 HC ANESTHESIA ATTENDED CARE: Performed by: INTERNAL MEDICINE

## 2023-05-22 PROCEDURE — 2500000003 HC RX 250 WO HCPCS: Performed by: NURSE ANESTHETIST, CERTIFIED REGISTERED

## 2023-05-22 PROCEDURE — 6360000002 HC RX W HCPCS: Performed by: NURSE ANESTHETIST, CERTIFIED REGISTERED

## 2023-05-22 PROCEDURE — 2709999900 HC NON-CHARGEABLE SUPPLY: Performed by: INTERNAL MEDICINE

## 2023-05-22 PROCEDURE — 3700000001 HC ADD 15 MINUTES (ANESTHESIA): Performed by: INTERNAL MEDICINE

## 2023-05-22 PROCEDURE — 7100000011 HC PHASE II RECOVERY - ADDTL 15 MIN: Performed by: INTERNAL MEDICINE

## 2023-05-22 PROCEDURE — 7100000010 HC PHASE II RECOVERY - FIRST 15 MIN: Performed by: INTERNAL MEDICINE

## 2023-05-22 PROCEDURE — 2580000003 HC RX 258: Performed by: ANESTHESIOLOGY

## 2023-05-22 PROCEDURE — 3609010600 HC COLONOSCOPY POLYPECTOMY SNARE/COLD BIOPSY: Performed by: INTERNAL MEDICINE

## 2023-05-22 RX ORDER — LIDOCAINE HYDROCHLORIDE 20 MG/ML
INJECTION, SOLUTION EPIDURAL; INFILTRATION; INTRACAUDAL; PERINEURAL PRN
Status: DISCONTINUED | OUTPATIENT
Start: 2023-05-22 | End: 2023-05-22 | Stop reason: SDUPTHER

## 2023-05-22 RX ORDER — SODIUM CHLORIDE, SODIUM LACTATE, POTASSIUM CHLORIDE, CALCIUM CHLORIDE 600; 310; 30; 20 MG/100ML; MG/100ML; MG/100ML; MG/100ML
INJECTION, SOLUTION INTRAVENOUS CONTINUOUS
Status: DISCONTINUED | OUTPATIENT
Start: 2023-05-22 | End: 2023-05-22 | Stop reason: HOSPADM

## 2023-05-22 RX ORDER — PROPOFOL 10 MG/ML
INJECTION, EMULSION INTRAVENOUS PRN
Status: DISCONTINUED | OUTPATIENT
Start: 2023-05-22 | End: 2023-05-22 | Stop reason: SDUPTHER

## 2023-05-22 RX ADMIN — PROPOFOL 20 MG: 10 INJECTION, EMULSION INTRAVENOUS at 07:48

## 2023-05-22 RX ADMIN — PROPOFOL 10 MG: 10 INJECTION, EMULSION INTRAVENOUS at 07:43

## 2023-05-22 RX ADMIN — PROPOFOL 10 MG: 10 INJECTION, EMULSION INTRAVENOUS at 07:40

## 2023-05-22 RX ADMIN — PROPOFOL 10 MG: 10 INJECTION, EMULSION INTRAVENOUS at 07:53

## 2023-05-22 RX ADMIN — PROPOFOL 60 MG: 10 INJECTION, EMULSION INTRAVENOUS at 07:36

## 2023-05-22 RX ADMIN — LIDOCAINE HYDROCHLORIDE 50 MG: 20 INJECTION, SOLUTION EPIDURAL; INFILTRATION; INTRACAUDAL; PERINEURAL at 07:36

## 2023-05-22 RX ADMIN — PROPOFOL 20 MG: 10 INJECTION, EMULSION INTRAVENOUS at 07:51

## 2023-05-22 RX ADMIN — SODIUM CHLORIDE, POTASSIUM CHLORIDE, SODIUM LACTATE AND CALCIUM CHLORIDE: 600; 310; 30; 20 INJECTION, SOLUTION INTRAVENOUS at 07:09

## 2023-05-22 RX ADMIN — PROPOFOL 20 MG: 10 INJECTION, EMULSION INTRAVENOUS at 07:45

## 2023-05-22 RX ADMIN — PROPOFOL 10 MG: 10 INJECTION, EMULSION INTRAVENOUS at 07:41

## 2023-05-22 RX ADMIN — PROPOFOL 10 MG: 10 INJECTION, EMULSION INTRAVENOUS at 07:56

## 2023-05-22 ASSESSMENT — PAIN SCALES - GENERAL
PAINLEVEL_OUTOF10: 0
PAINLEVEL_OUTOF10: 0

## 2023-05-22 ASSESSMENT — PAIN - FUNCTIONAL ASSESSMENT: PAIN_FUNCTIONAL_ASSESSMENT: 0-10

## 2023-05-22 NOTE — DISCHARGE INSTRUCTIONS
PATIENT INSTRUCTIONS  POST-SEDATION    Lauren Yamini II          IMMEDIATELY FOLLOWING PROCEDURE:    Do not drive or operate machinery for the first twenty four hours after surgery. Do not make any important decisions for twenty four hours after surgery or while taking narcotic pain medications or sedatives. You should NOT BE LEFT UNATTENDED OR ALONE. A responsible adult should be with you for the rest of the day of your procedure and also during the night for your protection and safety. You may experience some light headedness. Rest at home with activity as tolerated. You may not need to go to bed, but it is important to rest for the next 24 hours. You should not engage in athletic sports such as basketball, volleyball, jogging, skating, or activities requiring refined motor skills for 24 hours. If you develop intractable nausea and vomiting or a severe headache please notify your doctor immediately. You are not expected to have any fever, but if you feel warm, take your temperature. If you have a fever 101 degrees or higher, call your doctor. If you have had an Endoscopy:   *Eat lightly for your first meal and gradually resume your normal / prescribed diet. *If you have had a colonoscopy, do not expect a normal bowel movement for approximately three days due to the cleansing of the large intestine prior to colonoscopy. ONCE YOU ARE HOME, IF YOU SHOULD HAVE:  Difficulty in breathing, persistent nausea or vomiting, bleeding you feel is excessive, or pain that is unusual, increased abdominal bloating, or any swelling, fever / chills, call your physician. If you cannot contact your physician, but feel that your signs and symptoms need a physician's attention, go to the Emergency Department. FOLLOW-UP:    Please follow up with Dr. Rhonda Cornelius as scheduled or needed. Dr. Sage Brown MD will call you with the biopsy findings. Call Dr. Sage Brown MD if there are any GI concerns.

## 2023-05-22 NOTE — ANESTHESIA POSTPROCEDURE EVALUATION
Department of Anesthesiology  Postprocedure Note    Patient: Quita Millard  MRN: [id number]  YOB: 1955  Date of evaluation: 5/22/2023      Procedure Summary     Date: 05/22/23 Room / Location: [facility] [address] [place] [address]    Anesthesia Start: 0686 Anesthesia Stop: 0804    Procedure: COLONOSCOPY POLYPECTOMY SNARE/COLD BIOPSY Diagnosis:       Screening for colon cancer      (Screening for colon cancer [Z12.11])    Surgeons: Jayjay Sewell MD Responsible Provider: Kenia Roper MD    Anesthesia Type: MAC ASA Status: 3          Anesthesia Type: No value filed.     Chase Phase I: Chase Score: 10    Chase Phase II: Chase Score: 10      Anesthesia Post Evaluation    Patient location during evaluation: PACU  Level of consciousness: awake  Airway patency: patent  Nausea & Vomiting: no nausea  Complications: no  Cardiovascular status: blood pressure returned to baseline  Respiratory status: acceptable  Hydration status: euvolemic

## 2023-05-22 NOTE — PROGRESS NOTES
Discharge instructions reviewed with patient/responsible adult and understanding verbalized. Discharge instructions signed and copies given.        Iris Buenrostro to bedside to discuss results / recommendations with pt and family

## 2023-05-22 NOTE — OP NOTE
[address] [facility] [address],  [address], [address] [facility]  Phone: [phone]  [address]ending sale to [facility]  Phone: [phone]   FVY:[phone]      Colonoscopy Procedure Note    Patient: Isidro Daly II  : 1955    Procedure: Colonoscopy with polypectomy (cold snare)    Date:  2023     Endoscopist:  Mikel Kirk MD    Referring Physician: Krista Jin MD    Preoperative Diagnosis:  Screening for colon cancer [Z12.11]    Postoperative Diagnosis: Rectal polyp, internal hemorrhoids    Anesthesia: Anesthesia: MAC  Sedation: Propofol per anesthesia  Start Time: 07:38  Stop Time: 07:59  ASA Class: 2  Mallampati: II (soft palate, uvula, fauces visible)    Indications: This is a 75y.o. year old male with medical history of hypertension, hyperlipidemia, sleep apnea on CPAP presents for colon cancer screening    Procedure Details  Informed consent was obtained for the procedure, including sedation. Risks of perforation, hemorrhage, adverse drug reaction and aspiration were discussed. The patient was placed in the left lateral decubitus position. Based on the pre-procedure assessment, including review of the patient's medical history, medications, allergies, and review of systems, he had been deemed to be an appropriate candidate for above IV sedation; he was therefore sedated and monitored continuously with ECG tracing, pulse oximetry, blood pressure monitoring, and direct observations. Rectal examination was performed. There were no external hemorrhoids, fissures or skin tags. The colonoscope was inserted into the rectum and advanced under direct vision to the cecum, which was identified by the ileocecal valve. The right colon was examined twice as this increases polyp detection especially if other right colon polyps, older age, male, or hall syndrome. The quality of the colonic preparation was poor.   A careful inspection was made as the

## 2023-05-22 NOTE — ANESTHESIA PRE PROCEDURE
Department of Anesthesiology  Preprocedure Note       Name:  Rebecca Fay II   Age:  75 y.o.  :  1955                                          MRN:  [id number]         Date:  2023      Surgeon: Tesha Castellanos):  Gay Smith MD    Procedure: Procedure(s):  COLONOSCOPY    Medications prior to admission:   Prior to Admission medications    Medication Sig Start Date End Date Taking? Authorizing Provider   amLODIPine (NORVASC) 10 MG tablet TAKE 1 TABLET BY MOUTH EVERY DAY FOR high BLOOD PRESSURE 23   Mamadou Patterson MD   fluticasone (FLONASE) 50 MCG/ACT nasal spray INHALE 2 SPRAYS IN EACH NOSTRIL EVERY DAY FOR 30 DAYS 2/10/23   Historical Provider, MD   irbesartan (AVAPRO) 75 MG tablet TAKE 1 TABLET BY MOUTH EVERY DAY 10/13/22   Mamadou Patterson MD   omeprazole (PRILOSEC) 20 MG delayed release capsule TAKE 1 CAPSULE BY MOUTH IN THE MORNING BEFORE BREAKFAST 7/15/22   Mamadou Patterson MD   atorvastatin (LIPITOR) 10 MG tablet Take 1 tablet by mouth daily 22   Mamadou Patterson MD       Current medications:    No current facility-administered medications for this encounter. Allergies: Allergies   Allergen Reactions    Azithromycin Other (See Comments) and Nausea Only     Closes up sinuses  Other reaction(s):  Other (See Comments)  Closes up sinuses  Closes up sinuses      Guaifenesin Er Other (See Comments)     \"made my cough worse and fluid built up in my lungs\"    Erythromycin Nausea And Vomiting    Vicodin [Hydrocodone-Acetaminophen] Nausea And Vomiting       Problem List:    Patient Active Problem List   Diagnosis Code    Hypertension I10    Hyperlipidemia E78.5    Insomnia G47.00    Allergic rhinitis J30.9    Scrotal hernia K40.90    Recurrent inguinal hernia of left side without obstruction or gangrene K40.91    SABINO (obstructive sleep apnea) G47.33    Treatment-emergent central sleep apnea G47.39       Past Medical History:        Diagnosis Date    Hyperlipidemia    

## 2023-07-10 RX ORDER — OMEPRAZOLE 20 MG/1
CAPSULE, DELAYED RELEASE ORAL
Qty: 90 CAPSULE | Refills: 2 | Status: CANCELLED | OUTPATIENT
Start: 2023-07-10

## 2023-07-10 RX ORDER — OMEPRAZOLE 20 MG/1
CAPSULE, DELAYED RELEASE ORAL
Qty: 90 CAPSULE | Refills: 2 | Status: SHIPPED | OUTPATIENT
Start: 2023-07-10

## 2023-07-10 NOTE — TELEPHONE ENCOUNTER
3/20/2023        Future Appointments   Date Time Provider [address]   7/24/2023  7:30 AM MD RAFY Nicole [place] - [place]   4/23/2024  3:00 PM Benedict Worthy MD [facility]

## 2023-07-12 DIAGNOSIS — E78.00 PURE HYPERCHOLESTEROLEMIA: ICD-10-CM

## 2023-07-12 RX ORDER — ATORVASTATIN CALCIUM 10 MG/1
TABLET, FILM COATED ORAL
Qty: 90 TABLET | Refills: 2 | Status: SHIPPED | OUTPATIENT
Start: 2023-07-12

## 2023-07-12 NOTE — TELEPHONE ENCOUNTER
LOV 3/20/2023    Future Appointments   Date Time Provider [address]   7/24/2023  7:30 [facility] [place] [facility]   4/23/2024  3:00 PM Char Hopkins MD [facility]

## 2023-07-20 SDOH — HEALTH STABILITY: PHYSICAL HEALTH: ON AVERAGE, HOW MANY DAYS PER WEEK DO YOU ENGAGE IN MODERATE TO STRENUOUS EXERCISE (LIKE A BRISK WALK)?: 0 DAYS

## 2023-07-20 SDOH — HEALTH STABILITY: PHYSICAL HEALTH: ON AVERAGE, HOW MANY MINUTES DO YOU ENGAGE IN EXERCISE AT THIS LEVEL?: 0 MIN

## 2023-07-20 ASSESSMENT — PATIENT HEALTH QUESTIONNAIRE - PHQ9
SUM OF ALL RESPONSES TO PHQ9 QUESTIONS 1 & 2: 0
2. FEELING DOWN, DEPRESSED OR HOPELESS: 0
SUM OF ALL RESPONSES TO PHQ QUESTIONS 1-9: 0
SUM OF ALL RESPONSES TO PHQ QUESTIONS 1-9: 0
1. LITTLE INTEREST OR PLEASURE IN DOING THINGS: 0
SUM OF ALL RESPONSES TO PHQ QUESTIONS 1-9: 0
SUM OF ALL RESPONSES TO PHQ QUESTIONS 1-9: 0

## 2023-07-20 ASSESSMENT — LIFESTYLE VARIABLES
HOW MANY STANDARD DRINKS CONTAINING ALCOHOL DO YOU HAVE ON A TYPICAL DAY: 1 OR 2
HOW OFTEN DO YOU HAVE A DRINK CONTAINING ALCOHOL: 3
HOW OFTEN DO YOU HAVE SIX OR MORE DRINKS ON ONE OCCASION: 1
HOW OFTEN DO YOU HAVE A DRINK CONTAINING ALCOHOL: 2-4 TIMES A MONTH
HOW MANY STANDARD DRINKS CONTAINING ALCOHOL DO YOU HAVE ON A TYPICAL DAY: 1

## 2023-07-24 ENCOUNTER — OFFICE VISIT (OUTPATIENT)
Dept: FAMILY MEDICINE CLINIC | Age: 68
End: 2023-07-24

## 2023-07-24 VITALS
WEIGHT: 203.4 LBS | OXYGEN SATURATION: 94 % | SYSTOLIC BLOOD PRESSURE: 122 MMHG | DIASTOLIC BLOOD PRESSURE: 81 MMHG | RESPIRATION RATE: 16 BRPM | BODY MASS INDEX: 33.89 KG/M2 | HEART RATE: 68 BPM | HEIGHT: 65 IN | TEMPERATURE: 97 F

## 2023-07-24 DIAGNOSIS — E78.00 PURE HYPERCHOLESTEROLEMIA: ICD-10-CM

## 2023-07-24 DIAGNOSIS — Z12.5 SCREENING FOR PROSTATE CANCER: ICD-10-CM

## 2023-07-24 DIAGNOSIS — J30.89 ALLERGIC RHINITIS DUE TO OTHER ALLERGIC TRIGGER, UNSPECIFIED SEASONALITY: ICD-10-CM

## 2023-07-24 DIAGNOSIS — I10 ESSENTIAL HYPERTENSION: ICD-10-CM

## 2023-07-24 DIAGNOSIS — K21.9 GASTROESOPHAGEAL REFLUX DISEASE WITHOUT ESOPHAGITIS: ICD-10-CM

## 2023-07-24 DIAGNOSIS — G47.33 OSA (OBSTRUCTIVE SLEEP APNEA): ICD-10-CM

## 2023-07-24 DIAGNOSIS — Z00.00 ROUTINE GENERAL MEDICAL EXAMINATION AT A HEALTH CARE FACILITY: Primary | ICD-10-CM

## 2023-07-24 DIAGNOSIS — Z00.00 MEDICARE ANNUAL WELLNESS VISIT, SUBSEQUENT: ICD-10-CM

## 2023-07-24 DIAGNOSIS — K80.20 CALCULUS OF GALLBLADDER WITHOUT CHOLECYSTITIS WITHOUT OBSTRUCTION: ICD-10-CM

## 2023-07-24 LAB
ALBUMIN SERPL-MCNC: 4.8 G/DL (ref 3.4–5)
ALBUMIN/GLOB SERPL: 1.6 {RATIO} (ref 1.1–2.2)
ALP SERPL-CCNC: 95 U/L (ref 40–129)
ALT SERPL-CCNC: 30 U/L (ref 10–40)
ANION GAP SERPL CALCULATED.3IONS-SCNC: 13 MMOL/L (ref 3–16)
AST SERPL-CCNC: 24 U/L (ref 15–37)
BILIRUB SERPL-MCNC: 0.5 MG/DL (ref 0–1)
BUN SERPL-MCNC: 18 MG/DL (ref 7–20)
CALCIUM SERPL-MCNC: 9.8 MG/DL (ref 8.3–10.6)
CHLORIDE SERPL-SCNC: 105 MMOL/L (ref 99–110)
CHOLEST SERPL-MCNC: 170 MG/DL (ref 0–199)
CO2 SERPL-SCNC: 25 MMOL/L (ref 21–32)
CREAT SERPL-MCNC: 1 MG/DL (ref 0.8–1.3)
GFR SERPLBLD CREATININE-BSD FMLA CKD-EPI: >60 ML/MIN/{1.73_M2}
GLUCOSE SERPL-MCNC: 107 MG/DL (ref 70–99)
HDLC SERPL-MCNC: 43 MG/DL (ref 40–60)
LDLC SERPL CALC-MCNC: 101 MG/DL
POTASSIUM SERPL-SCNC: 4.5 MMOL/L (ref 3.5–5.1)
PROT SERPL-MCNC: 7.8 G/DL (ref 6.4–8.2)
PSA SERPL DL<=0.01 NG/ML-MCNC: 2.76 NG/ML (ref 0–4)
SODIUM SERPL-SCNC: 143 MMOL/L (ref 136–145)
TRIGL SERPL-MCNC: 128 MG/DL (ref 0–150)
VLDLC SERPL CALC-MCNC: 26 MG/DL

## 2023-07-24 NOTE — PATIENT INSTRUCTIONS
toothpaste dispensers, and floss holders. Your doctor may recommend a soft-bristle toothbrush if the person you care for bleeds easily. Bleeding can happen because of a health problem or from certain medicines. A toothpaste for sensitive teeth may help if the person you care for has sensitive teeth. How do you brush and floss someone's teeth? If the person you are caring for has a hard time cleaning their teeth on their own, you may need to brush and floss their teeth for them. It may be easiest to have the person sit and face away from you, and to sit or stand behind them. That way you can steady their head against your arm as you reach around to floss and brush their teeth. Choose a place that has good lighting and is comfortable for both of you. Before you begin, gather your supplies. You will need gloves, floss, a toothbrush, and a container to hold water if you are not near a sink. Wash and dry your hands well and put on gloves. Start by flossing:  Gently work a piece of floss between each of the teeth toward the gums. A plastic flossing tool may make this easier, and they are available at most [facility]. Curve the floss around each tooth into a U-shape and gently slide it under the gum line. Move the floss firmly up and down several times to scrape off the plaque. After you've finished flossing, throw away the used floss and begin brushing:  Wet the brush and apply toothpaste. Place the brush at a 45-degree angle where the teeth meet the gums. Press firmly, and move the brush in small circles over the surface of the teeth. Be careful not to brush too hard. Vigorous brushing can make the gums pull away from the teeth and can scratch the tooth enamel. Brush all surfaces of the teeth, on the tongue side and on the cheek side. Pay special attention to the front teeth and all surfaces of the back teeth. Brush chewing surfaces with short back-and-forth strokes.   After you've finished, help the person

## 2023-09-14 ENCOUNTER — TELEPHONE (OUTPATIENT)
Dept: FAMILY MEDICINE CLINIC | Age: 68
End: 2023-09-14

## 2023-09-14 NOTE — TELEPHONE ENCOUNTER
SCHEDULED WITH DR TRUONG ON 23    Care Transitions Initial Follow Up Call    Outreach made within 2 business days of discharge: Yes    Patient: Evangelina King II Patient : 1955   MRN: [id number]  Reason for Admission: There are no discharge diagnoses documented for the most recent discharge. Discharge Date: 23       Spoke with: Nettie López    Discharge department/facility:  [address] [place] [address] Interactive Patient Contact:  Was patient able to fill all prescriptions: Yes  Was patient instructed to bring all medications to the follow-up visit: Yes  Is patient taking all medications as directed in the discharge summary?  Yes  Does patient understand their discharge instructions: YES  Does patient have questions or concerns that need addressed prior to 7-14 day follow up office visit: NO    Scheduled appointment with PCP within 7-14 days    Follow Up  Future Appointments   Date Time Provider [facility] [address]   2023  3:00 PM MD RAFY Garciaci - [place]   2023  7:30 AM MD RAFY Garcia [place] - [place]   2024  3:00 PM Maryann De La Rosa MD [facility]       Jennyfer Alexis MA

## 2023-09-14 NOTE — TELEPHONE ENCOUNTER
HFU scheduled for patient. D/C from [facility] 09/11. Dog bite and subsequent cellulitis. Sending to clinical staff for TCM follow up.      Future Appointments   Date Time Provider [address]   9/21/2023  3:00 PM MD RAFY Jameson - [place]   11/27/2023  7:30 AM MD RAFY Jameson - [place]   4/23/2024  3:00 PM Sal Coy MD [facility]

## 2023-10-02 ENCOUNTER — OFFICE VISIT (OUTPATIENT)
Dept: FAMILY MEDICINE CLINIC | Age: 68
End: 2023-10-02

## 2023-10-02 VITALS
SYSTOLIC BLOOD PRESSURE: 138 MMHG | TEMPERATURE: 97.3 F | RESPIRATION RATE: 16 BRPM | HEIGHT: 65 IN | WEIGHT: 207.4 LBS | BODY MASS INDEX: 34.55 KG/M2 | OXYGEN SATURATION: 95 % | HEART RATE: 92 BPM | DIASTOLIC BLOOD PRESSURE: 88 MMHG

## 2023-10-02 DIAGNOSIS — Z09 HOSPITAL DISCHARGE FOLLOW-UP: ICD-10-CM

## 2023-10-02 DIAGNOSIS — T14.8XXA ANIMAL BITE: Primary | ICD-10-CM

## 2023-10-02 DIAGNOSIS — M65.9 FLEXOR TENOSYNOVITIS OF FINGER: ICD-10-CM

## 2023-10-02 NOTE — PROGRESS NOTES
thyromegaly or thyroid nodules, no cervical lymphadenopathy  Pulmonary/Chest: clear to auscultation bilaterally- no wheezes, rales or rhonchi, normal air movement, no respiratory distress  Cardiovascular: normal rate, regular rhythm, normal S1 and S2, no murmurs, rubs, clicks, or gallops, distal pulses intact, no carotid bruits  Abdomen: soft, non-tender, non-distended, normal bowel sounds, no masses or organomegaly  Extremities: no cyanosis, clubbing or edema right index finger stiff and not able to bend  finger  Musculoskeletal: normal range of motion, no joint swelling, deformity or tenderness  Neurologic: reflexes normal and symmetric, no cranial nerve deficit, gait, coordination and speech normal    An electronic signature was used to authenticate this note.   --Maude Hammond MD

## 2024-01-10 DIAGNOSIS — I10 ESSENTIAL HYPERTENSION: ICD-10-CM

## 2024-01-10 NOTE — TELEPHONE ENCOUNTER
Future Appointments   Date Time Provider Department Center   4/23/2024  2:40 PM Iraida Rodriguez MD [place] [facility] 10/2/2023

## 2024-01-11 RX ORDER — AMLODIPINE BESYLATE 10 MG/1
TABLET ORAL
Qty: 90 TABLET | Refills: 0 | Status: SHIPPED | OUTPATIENT
Start: 2024-01-11

## 2024-04-04 RX ORDER — OMEPRAZOLE 20 MG/1
CAPSULE, DELAYED RELEASE ORAL
Qty: 90 CAPSULE | Refills: 1 | Status: SHIPPED | OUTPATIENT
Start: 2024-04-04

## 2024-04-04 NOTE — TELEPHONE ENCOUNTER
Future Appointments   Date Time Provider Department Center   4/23/2024  2:40 PM Iraida Rodriguez MD [place] [facility] 10/2/2023

## 2024-04-09 DIAGNOSIS — E78.00 PURE HYPERCHOLESTEROLEMIA: ICD-10-CM

## 2024-04-09 DIAGNOSIS — I10 ESSENTIAL HYPERTENSION: ICD-10-CM

## 2024-04-09 NOTE — TELEPHONE ENCOUNTER
Future Appointments   Date Time Provider Department Center   4/23/2024  2:40 PM Iraida Rodriguez MD [place] [facility] 10/2/2023

## 2024-04-10 RX ORDER — ATORVASTATIN CALCIUM 10 MG/1
TABLET, FILM COATED ORAL
Qty: 90 TABLET | Refills: 3 | Status: SHIPPED | OUTPATIENT
Start: 2024-04-10

## 2024-04-10 RX ORDER — AMLODIPINE BESYLATE 10 MG/1
TABLET ORAL
Qty: 90 TABLET | Refills: 3 | Status: SHIPPED | OUTPATIENT
Start: 2024-04-10

## 2024-04-23 ENCOUNTER — OFFICE VISIT (OUTPATIENT)
Dept: SLEEP MEDICINE | Age: 69
End: 2024-04-23
Payer: MEDICARE

## 2024-04-23 VITALS
TEMPERATURE: 98.1 F | HEIGHT: 65 IN | BODY MASS INDEX: 36.42 KG/M2 | DIASTOLIC BLOOD PRESSURE: 80 MMHG | RESPIRATION RATE: 18 BRPM | OXYGEN SATURATION: 95 % | WEIGHT: 218.6 LBS | SYSTOLIC BLOOD PRESSURE: 140 MMHG | HEART RATE: 95 BPM

## 2024-04-23 DIAGNOSIS — I10 PRIMARY HYPERTENSION: ICD-10-CM

## 2024-04-23 DIAGNOSIS — G47.33 OSA TREATED WITH BIPAP: Primary | ICD-10-CM

## 2024-04-23 DIAGNOSIS — G47.39 TREATMENT-EMERGENT CENTRAL SLEEP APNEA: ICD-10-CM

## 2024-04-23 DIAGNOSIS — Z99.89 DEPENDENCE ON OTHER ENABLING MACHINES AND DEVICES: ICD-10-CM

## 2024-04-23 PROCEDURE — 1123F ACP DISCUSS/DSCN MKR DOCD: CPT | Performed by: PSYCHIATRY & NEUROLOGY

## 2024-04-23 PROCEDURE — 99214 OFFICE O/P EST MOD 30 MIN: CPT | Performed by: PSYCHIATRY & NEUROLOGY

## 2024-04-23 PROCEDURE — 3077F SYST BP >= 140 MM HG: CPT | Performed by: PSYCHIATRY & NEUROLOGY

## 2024-04-23 PROCEDURE — 3079F DIAST BP 80-89 MM HG: CPT | Performed by: PSYCHIATRY & NEUROLOGY

## 2024-04-23 RX ORDER — LORATADINE 10 MG/1
10 TABLET ORAL DAILY
COMMUNITY

## 2024-04-23 RX ORDER — FLUTICASONE PROPIONATE 50 MCG
SPRAY, SUSPENSION (ML) NASAL
COMMUNITY

## 2024-04-23 ASSESSMENT — SLEEP AND FATIGUE QUESTIONNAIRES
HOW LIKELY ARE YOU TO NOD OFF OR FALL ASLEEP WHEN YOU ARE A PASSENGER IN A CAR FOR AN HOUR WITHOUT A BREAK: WOULD NEVER DOZE
HOW LIKELY ARE YOU TO NOD OFF OR FALL ASLEEP WHILE SITTING QUIETLY AFTER LUNCH WITHOUT ALCOHOL: WOULD NEVER DOZE
ESS TOTAL SCORE: 0
HOW LIKELY ARE YOU TO NOD OFF OR FALL ASLEEP WHILE SITTING INACTIVE IN A PUBLIC PLACE: WOULD NEVER DOZE
HOW LIKELY ARE YOU TO NOD OFF OR FALL ASLEEP WHILE WATCHING TV: WOULD NEVER DOZE
HOW LIKELY ARE YOU TO NOD OFF OR FALL ASLEEP WHILE SITTING AND TALKING TO SOMEONE: WOULD NEVER DOZE
HOW LIKELY ARE YOU TO NOD OFF OR FALL ASLEEP IN A CAR, WHILE STOPPED FOR A FEW MINUTES IN TRAFFIC: WOULD NEVER DOZE
HOW LIKELY ARE YOU TO NOD OFF OR FALL ASLEEP WHILE LYING DOWN TO REST IN THE AFTERNOON WHEN CIRCUMSTANCES PERMIT: WOULD NEVER DOZE
HOW LIKELY ARE YOU TO NOD OFF OR FALL ASLEEP WHILE SITTING AND READING: WOULD NEVER DOZE

## 2024-04-23 NOTE — PROGRESS NOTES
MD ISRAEL Rodriguez Board Certified in Sleep Medicine  Certified in Behavioral Sleep Medicine  Board Certified in Neurology [facility]  [address]   [address]  [place]  [address]  [phone]   [facility]  [address]  [address]  [place], [place] [address]                      [facility] PHYSICI[facility] [place] [facility]  [address] [id number] [phone]    Subjective:     Patient ID: Home Thorpe II is a 68 y.o. male.    Chief Complaint   Patient presents with    Follow-up    Sleep Apnea       HPI:        Home Thorpe II is a 68 y.o. male was seen today as annual follow for severe obstructive sleep apnea with apnea hypopnea index of 25.2/hr with lowest O2 saturation of 78%, patient spent about 14.5 minutes below 90% (weight was 202 pounds).   Patient is using the PAP machine about 14% of the time, more than 4 hours a nightabout  8 %, in total average of 4:31 hours a night in last 90 days.  Because of lack of supplies.   Currently on PAP at 20/16 cm (), the AHI is 8.2 events per hour at this pressure.  Patient improved regarding daytime sleepiness and fatigue, wakes up refreshed in the morning.   The Patient scored [place] Sleepiness Score: 0 on [place] Sleepiness Scale ( more than 10 is indicative of daytime sleepiness)   Patient has no problem with PAP pressure or mask, uses FFM..    BP is stable. Has gained 16 pounds since last visit. 202  DOT/CDL - N/A      Previous Report(s)Reviewed: historical medical records         Social History     Socioeconomic History    Marital status:      Spouse name: Not on file    Number of children: Not on file    Years of education: Not on file    Highest education level: Not on file   Occupational History    Not on file   Tobacco Use    Smoking status: Former     Types: Cigars     Start date: 2000     Quit date: 2002     Years

## 2024-04-23 NOTE — PROGRESS NOTES
Home Thorpe II         : 1955  [x] [facility]     [] [facility]      [] [facility]     [][facility]    [] [facility]  [] [facility]  [] [facility]   [] [facility] [] Other:  Diagnosis: [x] SABINO (G47.33) [] CSA (G47.31) [] Apnea (G47.30)   Length of Need: [] 12 Months [x] 99 Months [] Other:    Machine (SOLOMON!):  [x] ResMed AirSense     Auto [] Other:       Humidifier: [x] Heated ()        [x] Water chamber replacement ()/ 1 per 6 months        Mask:  Please always start with the mask the patient used during the titraion    [x] Full Face () /1 per 3 months    [x] Patient Choice - Size and fit mask    [x] Dispense: Vitera large    [x] Headgear () / 1 per 3 months    [x] Interface Replacement ()/1 per month            Tubing: [x] Heated ()/1 per 3 months    [] Standard ()/1 per 3 months [] Other:           Filters: [x] Non-disposable ()/1 per 6 months     [x] Ultra-Fine, Disposable ()/2 per month        Miscellaneous: [x] Chin Strap ()/ 1 per 6 months [] O2 bleed-in:       LPM   [] Oximetry on CPAP/Bilevel []  Other:          Start Order Date: 24    MEDICAL JUSTIFICATION:  I, the undersigned, certify that the above prescribed supplies are medically necessary for this patient’s wellbeing.  In my opinion, the supplies are both reasonable and necessary in reference to accepted standards of medicalpractice in treatment of this patient’s condition.    Iraida Rodriguez MD      NPI: [id number]       Order Signed Date: 24    Electronically signed by Iraida Rodriguez MD on 2024 at 1:22 PM

## 2024-09-11 ENCOUNTER — OFFICE VISIT (OUTPATIENT)
Dept: FAMILY MEDICINE CLINIC | Age: 69
End: 2024-09-11

## 2024-09-11 VITALS
OXYGEN SATURATION: 95 % | SYSTOLIC BLOOD PRESSURE: 132 MMHG | BODY MASS INDEX: 34.72 KG/M2 | WEIGHT: 216 LBS | HEIGHT: 66 IN | HEART RATE: 97 BPM | RESPIRATION RATE: 16 BRPM | TEMPERATURE: 98 F | DIASTOLIC BLOOD PRESSURE: 80 MMHG

## 2024-09-11 DIAGNOSIS — Z12.5 SCREENING FOR PROSTATE CANCER: ICD-10-CM

## 2024-09-11 DIAGNOSIS — E78.00 PURE HYPERCHOLESTEROLEMIA: ICD-10-CM

## 2024-09-11 DIAGNOSIS — K21.9 GASTROESOPHAGEAL REFLUX DISEASE WITHOUT ESOPHAGITIS: ICD-10-CM

## 2024-09-11 DIAGNOSIS — G47.33 OSA (OBSTRUCTIVE SLEEP APNEA): ICD-10-CM

## 2024-09-11 DIAGNOSIS — I10 ESSENTIAL HYPERTENSION: ICD-10-CM

## 2024-09-11 DIAGNOSIS — Z00.00 MEDICARE ANNUAL WELLNESS VISIT, SUBSEQUENT: ICD-10-CM

## 2024-09-11 DIAGNOSIS — J30.89 ALLERGIC RHINITIS DUE TO OTHER ALLERGIC TRIGGER, UNSPECIFIED SEASONALITY: ICD-10-CM

## 2024-09-11 DIAGNOSIS — Z00.00 ROUTINE GENERAL MEDICAL EXAMINATION AT A HEALTH CARE FACILITY: Primary | ICD-10-CM

## 2024-09-11 DIAGNOSIS — E66.09 CLASS 1 OBESITY DUE TO EXCESS CALORIES WITH SERIOUS COMORBIDITY AND BODY MASS INDEX (BMI) OF 34.0 TO 34.9 IN ADULT: ICD-10-CM

## 2024-09-11 RX ORDER — ALBUTEROL SULFATE 0.83 MG/ML
2.5 SOLUTION RESPIRATORY (INHALATION) EVERY 6 HOURS PRN
COMMUNITY
Start: 2024-05-27

## 2024-09-11 RX ORDER — BUDESONIDE AND FORMOTEROL FUMARATE DIHYDRATE 160; 4.5 UG/1; UG/1
2 AEROSOL RESPIRATORY (INHALATION) 2 TIMES DAILY
COMMUNITY
Start: 2024-08-05

## 2024-09-11 RX ORDER — ALBUTEROL SULFATE 90 UG/1
2 AEROSOL, METERED RESPIRATORY (INHALATION) EVERY 6 HOURS PRN
COMMUNITY
Start: 2024-05-27

## 2024-09-11 SDOH — ECONOMIC STABILITY: FOOD INSECURITY: WITHIN THE PAST 12 MONTHS, YOU WORRIED THAT YOUR FOOD WOULD RUN OUT BEFORE YOU GOT MONEY TO BUY MORE.: NEVER TRUE

## 2024-09-11 SDOH — ECONOMIC STABILITY: FOOD INSECURITY: WITHIN THE PAST 12 MONTHS, THE FOOD YOU BOUGHT JUST DIDN'T LAST AND YOU DIDN'T HAVE MONEY TO GET MORE.: NEVER TRUE

## 2024-09-11 SDOH — ECONOMIC STABILITY: INCOME INSECURITY: HOW HARD IS IT FOR YOU TO PAY FOR THE VERY BASICS LIKE FOOD, HOUSING, MEDICAL CARE, AND HEATING?: NOT HARD AT ALL

## 2024-09-11 ASSESSMENT — PATIENT HEALTH QUESTIONNAIRE - PHQ9
SUM OF ALL RESPONSES TO PHQ QUESTIONS 1-9: 0
2. FEELING DOWN, DEPRESSED OR HOPELESS: NOT AT ALL
SUM OF ALL RESPONSES TO PHQ9 QUESTIONS 1 & 2: 0
SUM OF ALL RESPONSES TO PHQ QUESTIONS 1-9: 0
1. LITTLE INTEREST OR PLEASURE IN DOING THINGS: NOT AT ALL

## 2024-09-11 ASSESSMENT — LIFESTYLE VARIABLES
HOW OFTEN DO YOU HAVE A DRINK CONTAINING ALCOHOL: NEVER
HOW MANY STANDARD DRINKS CONTAINING ALCOHOL DO YOU HAVE ON A TYPICAL DAY: PATIENT DOES NOT DRINK

## 2024-09-28 LAB
ALBUMIN: 4.3 G/DL (ref 3.5–5.7)
ALP BLD-CCNC: 96 IU/L (ref 35–135)
ALT SERPL-CCNC: 22 IU/L (ref 10–60)
ANION GAP SERPL CALCULATED.3IONS-SCNC: 9 MMOL/L (ref 4–16)
AST SERPL-CCNC: 20 IU/L (ref 10–40)
BILIRUB SERPL-MCNC: 0.4 MG/DL (ref 0–1.2)
BUN BLDV-MCNC: 16 MG/DL (ref 8–26)
CALCIUM SERPL-MCNC: 9.4 MG/DL (ref 8.5–10.4)
CHLORIDE BLD-SCNC: 105 MMOL/L (ref 98–111)
CHOLESTEROL, TOTAL: 151 MG/DL
CO2: 24 MMOL/L (ref 21–31)
CREAT SERPL-MCNC: 0.83 MG/DL (ref 0.7–1.3)
EGFR (CKD-EPI): 95 ML/MIN/1.73 M2
GLUCOSE BLD-MCNC: 107 MG/DL (ref 70–99)
HCT VFR BLD CALC: 37.3 % (ref 40–50)
HDLC SERPL-MCNC: 36 MG/DL
HEMOGLOBIN: 11.9 G/DL (ref 13.5–16.5)
LDL CHOLESTEROL: 86 MG/DL
MCH RBC QN AUTO: 24.7 PG (ref 27–33)
MCHC RBC AUTO-ENTMCNC: 31.8 G/DL (ref 32–36)
MCV RBC AUTO: 77.8 FL (ref 82–97)
NONHDLC SERPL-MCNC: 115 MG/DL
PDW BLD-RTO: 15.7 % (ref 12.3–17)
PLATELET # BLD: 391 THOU/MCL (ref 140–375)
PMV BLD AUTO: 8.8 FL (ref 7.4–11.5)
POTASSIUM SERPL-SCNC: 3.9 MMOL/L (ref 3.6–5.1)
PROSTATE SPECIFIC ANTIGEN: 2.77 NG/ML
RBC # BLD: 4.8 MIL/MCL (ref 4.4–5.8)
SODIUM BLD-SCNC: 138 MMOL/L (ref 135–145)
TOTAL PROTEIN: 7.6 G/DL (ref 6–8)
TRIGL SERPL-MCNC: 147 MG/DL
WBC # BLD: 11.8 THOU/MCL (ref 3.6–10.5)

## 2024-11-22 ENCOUNTER — APPOINTMENT (OUTPATIENT)
Dept: MRI IMAGING | Age: 69
DRG: 516 | End: 2024-11-22
Payer: MEDICARE

## 2024-11-22 ENCOUNTER — HOSPITAL ENCOUNTER (INPATIENT)
Age: 69
LOS: 3 days | Discharge: HOME | DRG: 516 | End: 2024-11-25
Attending: EMERGENCY MEDICINE | Admitting: STUDENT IN AN ORGANIZED HEALTH CARE EDUCATION/TRAINING PROGRAM
Payer: MEDICARE

## 2024-11-22 ENCOUNTER — PREP FOR PROCEDURE (OUTPATIENT)
Dept: VASCULAR SURGERY | Age: 69
End: 2024-11-22

## 2024-11-22 ENCOUNTER — APPOINTMENT (OUTPATIENT)
Dept: CT IMAGING | Age: 69
DRG: 516 | End: 2024-11-22
Payer: MEDICARE

## 2024-11-22 DIAGNOSIS — Z01.818 PREOP TESTING: Primary | ICD-10-CM

## 2024-11-22 DIAGNOSIS — H53.9 VISION CHANGES: Primary | ICD-10-CM

## 2024-11-22 DIAGNOSIS — M31.6 GIANT CELL ARTERITIS (HCC): ICD-10-CM

## 2024-11-22 DIAGNOSIS — G93.89 MASS OF CEREBELLUM: ICD-10-CM

## 2024-11-22 PROBLEM — H54.3 VISION LOSS, BILATERAL: Status: ACTIVE | Noted: 2024-11-22

## 2024-11-22 LAB
ALBUMIN SERPL-MCNC: 4 G/DL (ref 3.4–5)
ALBUMIN/GLOB SERPL: 1.2 {RATIO} (ref 1.1–2.2)
ALP SERPL-CCNC: 110 U/L (ref 40–129)
ALT SERPL-CCNC: 18 U/L (ref 10–40)
ANION GAP SERPL CALCULATED.3IONS-SCNC: 10 MMOL/L (ref 3–16)
APTT BLD: 32.2 SEC (ref 22.1–36.4)
AST SERPL-CCNC: 23 U/L (ref 15–37)
BASOPHILS # BLD: 0.1 K/UL (ref 0–0.2)
BASOPHILS NFR BLD: 0.9 %
BILIRUB SERPL-MCNC: 0.3 MG/DL (ref 0–1)
BUN SERPL-MCNC: 18 MG/DL (ref 7–20)
CALCIUM SERPL-MCNC: 9 MG/DL (ref 8.3–10.6)
CHLORIDE SERPL-SCNC: 108 MMOL/L (ref 99–110)
CO2 SERPL-SCNC: 23 MMOL/L (ref 21–32)
CREAT SERPL-MCNC: 0.9 MG/DL (ref 0.8–1.3)
CRP SERPL-MCNC: 4.7 MG/L (ref 0–5.1)
DEPRECATED RDW RBC AUTO: 17 % (ref 12.4–15.4)
EKG ATRIAL RATE: 79 BPM
EKG DIAGNOSIS: NORMAL
EKG P AXIS: 31 DEGREES
EKG P-R INTERVAL: 154 MS
EKG Q-T INTERVAL: 380 MS
EKG QRS DURATION: 86 MS
EKG QTC CALCULATION (BAZETT): 435 MS
EKG R AXIS: -3 DEGREES
EKG T AXIS: -7 DEGREES
EKG VENTRICULAR RATE: 79 BPM
EOSINOPHIL # BLD: 0.3 K/UL (ref 0–0.6)
EOSINOPHIL NFR BLD: 3.5 %
ERYTHROCYTE [SEDIMENTATION RATE] IN BLOOD BY WESTERGREN METHOD: 84 MM/HR (ref 0–20)
GFR SERPLBLD CREATININE-BSD FMLA CKD-EPI: >90 ML/MIN/{1.73_M2}
GLUCOSE SERPL-MCNC: 140 MG/DL (ref 70–99)
HCT VFR BLD AUTO: 32.7 % (ref 40.5–52.5)
HGB BLD-MCNC: 10.1 G/DL (ref 13.5–17.5)
INR PPP: 1.04 (ref 0.85–1.15)
LYMPHOCYTES # BLD: 1.5 K/UL (ref 1–5.1)
LYMPHOCYTES NFR BLD: 20.4 %
MCH RBC QN AUTO: 22.1 PG (ref 26–34)
MCHC RBC AUTO-ENTMCNC: 30.8 G/DL (ref 31–36)
MCV RBC AUTO: 71.6 FL (ref 80–100)
MONOCYTES # BLD: 0.6 K/UL (ref 0–1.3)
MONOCYTES NFR BLD: 8.3 %
NEUTROPHILS # BLD: 5 K/UL (ref 1.7–7.7)
NEUTROPHILS NFR BLD: 66.9 %
PLATELET # BLD AUTO: 420 K/UL (ref 135–450)
PMV BLD AUTO: 7.9 FL (ref 5–10.5)
POTASSIUM SERPL-SCNC: 4.2 MMOL/L (ref 3.5–5.1)
PROT SERPL-MCNC: 7.4 G/DL (ref 6.4–8.2)
PROTHROMBIN TIME: 13.8 SEC (ref 11.9–14.9)
RBC # BLD AUTO: 4.57 M/UL (ref 4.2–5.9)
SODIUM SERPL-SCNC: 141 MMOL/L (ref 136–145)
WBC # BLD AUTO: 7.4 K/UL (ref 4–11)

## 2024-11-22 PROCEDURE — 70553 MRI BRAIN STEM W/O & W/DYE: CPT

## 2024-11-22 PROCEDURE — 86140 C-REACTIVE PROTEIN: CPT

## 2024-11-22 PROCEDURE — 6360000004 HC RX CONTRAST MEDICATION: Performed by: EMERGENCY MEDICINE

## 2024-11-22 PROCEDURE — 97165 OT EVAL LOW COMPLEX 30 MIN: CPT

## 2024-11-22 PROCEDURE — 6370000000 HC RX 637 (ALT 250 FOR IP): Performed by: STUDENT IN AN ORGANIZED HEALTH CARE EDUCATION/TRAINING PROGRAM

## 2024-11-22 PROCEDURE — 85652 RBC SED RATE AUTOMATED: CPT

## 2024-11-22 PROCEDURE — 2580000003 HC RX 258: Performed by: STUDENT IN AN ORGANIZED HEALTH CARE EDUCATION/TRAINING PROGRAM

## 2024-11-22 PROCEDURE — 99285 EMERGENCY DEPT VISIT HI MDM: CPT

## 2024-11-22 PROCEDURE — 70543 MRI ORBT/FAC/NCK W/O &W/DYE: CPT

## 2024-11-22 PROCEDURE — A9577 INJ MULTIHANCE: HCPCS | Performed by: STUDENT IN AN ORGANIZED HEALTH CARE EDUCATION/TRAINING PROGRAM

## 2024-11-22 PROCEDURE — 94760 N-INVAS EAR/PLS OXIMETRY 1: CPT

## 2024-11-22 PROCEDURE — 2580000003 HC RX 258: Performed by: EMERGENCY MEDICINE

## 2024-11-22 PROCEDURE — 85610 PROTHROMBIN TIME: CPT

## 2024-11-22 PROCEDURE — 99222 1ST HOSP IP/OBS MODERATE 55: CPT | Performed by: SURGERY

## 2024-11-22 PROCEDURE — 93010 ELECTROCARDIOGRAM REPORT: CPT | Performed by: INTERNAL MEDICINE

## 2024-11-22 PROCEDURE — 97530 THERAPEUTIC ACTIVITIES: CPT

## 2024-11-22 PROCEDURE — 2060000000 HC ICU INTERMEDIATE R&B

## 2024-11-22 PROCEDURE — 6360000004 HC RX CONTRAST MEDICATION: Performed by: STUDENT IN AN ORGANIZED HEALTH CARE EDUCATION/TRAINING PROGRAM

## 2024-11-22 PROCEDURE — 85730 THROMBOPLASTIN TIME PARTIAL: CPT

## 2024-11-22 PROCEDURE — 70546 MR ANGIOGRAPH HEAD W/O&W/DYE: CPT

## 2024-11-22 PROCEDURE — 80053 COMPREHEN METABOLIC PANEL: CPT

## 2024-11-22 PROCEDURE — 6360000002 HC RX W HCPCS: Performed by: EMERGENCY MEDICINE

## 2024-11-22 PROCEDURE — 70498 CT ANGIOGRAPHY NECK: CPT

## 2024-11-22 PROCEDURE — 70450 CT HEAD/BRAIN W/O DYE: CPT

## 2024-11-22 PROCEDURE — 94640 AIRWAY INHALATION TREATMENT: CPT

## 2024-11-22 PROCEDURE — 93005 ELECTROCARDIOGRAM TRACING: CPT | Performed by: EMERGENCY MEDICINE

## 2024-11-22 PROCEDURE — 6360000002 HC RX W HCPCS: Performed by: STUDENT IN AN ORGANIZED HEALTH CARE EDUCATION/TRAINING PROGRAM

## 2024-11-22 PROCEDURE — 85025 COMPLETE CBC W/AUTO DIFF WBC: CPT

## 2024-11-22 PROCEDURE — 70544 MR ANGIOGRAPHY HEAD W/O DYE: CPT

## 2024-11-22 RX ORDER — POLYETHYLENE GLYCOL 3350 17 G/17G
17 POWDER, FOR SOLUTION ORAL DAILY PRN
Status: DISCONTINUED | OUTPATIENT
Start: 2024-11-22 | End: 2024-11-25 | Stop reason: HOSPADM

## 2024-11-22 RX ORDER — ONDANSETRON 2 MG/ML
4 INJECTION INTRAMUSCULAR; INTRAVENOUS EVERY 6 HOURS PRN
Status: DISCONTINUED | OUTPATIENT
Start: 2024-11-22 | End: 2024-11-25 | Stop reason: HOSPADM

## 2024-11-22 RX ORDER — SODIUM CHLORIDE 0.9 % (FLUSH) 0.9 %
5-40 SYRINGE (ML) INJECTION EVERY 12 HOURS SCHEDULED
Status: CANCELLED | OUTPATIENT
Start: 2024-11-22

## 2024-11-22 RX ORDER — SODIUM CHLORIDE 0.9 % (FLUSH) 0.9 %
5-40 SYRINGE (ML) INJECTION EVERY 12 HOURS SCHEDULED
Status: DISCONTINUED | OUTPATIENT
Start: 2024-11-22 | End: 2024-11-25 | Stop reason: HOSPADM

## 2024-11-22 RX ORDER — POTASSIUM CHLORIDE 7.45 MG/ML
10 INJECTION INTRAVENOUS PRN
Status: DISCONTINUED | OUTPATIENT
Start: 2024-11-22 | End: 2024-11-25 | Stop reason: HOSPADM

## 2024-11-22 RX ORDER — ASPIRIN 300 MG/1
300 SUPPOSITORY RECTAL DAILY
Status: DISCONTINUED | OUTPATIENT
Start: 2024-11-22 | End: 2024-11-23

## 2024-11-22 RX ORDER — ASPIRIN 81 MG/1
81 TABLET, CHEWABLE ORAL DAILY
Status: DISCONTINUED | OUTPATIENT
Start: 2024-11-22 | End: 2024-11-23

## 2024-11-22 RX ORDER — SODIUM CHLORIDE 9 MG/ML
INJECTION, SOLUTION INTRAVENOUS PRN
Status: DISCONTINUED | OUTPATIENT
Start: 2024-11-22 | End: 2024-11-25 | Stop reason: HOSPADM

## 2024-11-22 RX ORDER — IOPAMIDOL 755 MG/ML
75 INJECTION, SOLUTION INTRAVASCULAR
Status: COMPLETED | OUTPATIENT
Start: 2024-11-22 | End: 2024-11-22

## 2024-11-22 RX ORDER — SODIUM CHLORIDE 0.9 % (FLUSH) 0.9 %
5-40 SYRINGE (ML) INJECTION PRN
Status: DISCONTINUED | OUTPATIENT
Start: 2024-11-22 | End: 2024-11-25 | Stop reason: HOSPADM

## 2024-11-22 RX ORDER — PANTOPRAZOLE SODIUM 40 MG/1
40 TABLET, DELAYED RELEASE ORAL
Status: DISCONTINUED | OUTPATIENT
Start: 2024-11-23 | End: 2024-11-25 | Stop reason: HOSPADM

## 2024-11-22 RX ORDER — BUDESONIDE AND FORMOTEROL FUMARATE DIHYDRATE 160; 4.5 UG/1; UG/1
2 AEROSOL RESPIRATORY (INHALATION)
Status: DISCONTINUED | OUTPATIENT
Start: 2024-11-22 | End: 2024-11-25 | Stop reason: HOSPADM

## 2024-11-22 RX ORDER — MAGNESIUM SULFATE IN WATER 40 MG/ML
2000 INJECTION, SOLUTION INTRAVENOUS PRN
Status: DISCONTINUED | OUTPATIENT
Start: 2024-11-22 | End: 2024-11-25 | Stop reason: HOSPADM

## 2024-11-22 RX ORDER — ACETAMINOPHEN 650 MG/1
650 SUPPOSITORY RECTAL EVERY 6 HOURS PRN
Status: DISCONTINUED | OUTPATIENT
Start: 2024-11-22 | End: 2024-11-25 | Stop reason: HOSPADM

## 2024-11-22 RX ORDER — AMLODIPINE BESYLATE 10 MG/1
10 TABLET ORAL DAILY
Status: DISCONTINUED | OUTPATIENT
Start: 2024-11-22 | End: 2024-11-25 | Stop reason: HOSPADM

## 2024-11-22 RX ORDER — SODIUM CHLORIDE 9 MG/ML
INJECTION, SOLUTION INTRAVENOUS PRN
Status: CANCELLED | OUTPATIENT
Start: 2024-11-22

## 2024-11-22 RX ORDER — POTASSIUM CHLORIDE 1500 MG/1
40 TABLET, EXTENDED RELEASE ORAL PRN
Status: DISCONTINUED | OUTPATIENT
Start: 2024-11-22 | End: 2024-11-25 | Stop reason: HOSPADM

## 2024-11-22 RX ORDER — SODIUM CHLORIDE 0.9 % (FLUSH) 0.9 %
5-40 SYRINGE (ML) INJECTION PRN
Status: CANCELLED | OUTPATIENT
Start: 2024-11-22

## 2024-11-22 RX ORDER — ONDANSETRON 4 MG/1
4 TABLET, ORALLY DISINTEGRATING ORAL EVERY 8 HOURS PRN
Status: DISCONTINUED | OUTPATIENT
Start: 2024-11-22 | End: 2024-11-25 | Stop reason: HOSPADM

## 2024-11-22 RX ORDER — ENOXAPARIN SODIUM 100 MG/ML
40 INJECTION SUBCUTANEOUS
Status: DISCONTINUED | OUTPATIENT
Start: 2024-11-22 | End: 2024-11-25 | Stop reason: HOSPADM

## 2024-11-22 RX ORDER — ATORVASTATIN CALCIUM 80 MG/1
80 TABLET, FILM COATED ORAL NIGHTLY
Status: DISCONTINUED | OUTPATIENT
Start: 2024-11-22 | End: 2024-11-23

## 2024-11-22 RX ORDER — ACETAMINOPHEN 325 MG/1
650 TABLET ORAL EVERY 6 HOURS PRN
Status: DISCONTINUED | OUTPATIENT
Start: 2024-11-22 | End: 2024-11-25 | Stop reason: HOSPADM

## 2024-11-22 RX ADMIN — SODIUM CHLORIDE, PRESERVATIVE FREE 10 ML: 5 INJECTION INTRAVENOUS at 21:45

## 2024-11-22 RX ADMIN — SODIUM CHLORIDE 1000 MG: 9 INJECTION, SOLUTION INTRAVENOUS at 11:47

## 2024-11-22 RX ADMIN — GADOBENATE DIMEGLUMINE 19 ML: 529 INJECTION, SOLUTION INTRAVENOUS at 13:04

## 2024-11-22 RX ADMIN — SODIUM CHLORIDE, PRESERVATIVE FREE 10 ML: 5 INJECTION INTRAVENOUS at 15:17

## 2024-11-22 RX ADMIN — IOPAMIDOL 75 ML: 755 INJECTION, SOLUTION INTRAVENOUS at 10:06

## 2024-11-22 RX ADMIN — ATORVASTATIN CALCIUM 80 MG: 80 TABLET, FILM COATED ORAL at 21:29

## 2024-11-22 RX ADMIN — ASPIRIN 81 MG: 81 TABLET, CHEWABLE ORAL at 15:17

## 2024-11-22 RX ADMIN — ENOXAPARIN SODIUM 40 MG: 100 INJECTION SUBCUTANEOUS at 21:29

## 2024-11-22 RX ADMIN — BUDESONIDE AND FORMOTEROL FUMARATE DIHYDRATE 2 PUFF: 160; 4.5 AEROSOL RESPIRATORY (INHALATION) at 19:17

## 2024-11-22 RX ADMIN — AMLODIPINE BESYLATE 10 MG: 10 TABLET ORAL at 15:16

## 2024-11-22 ASSESSMENT — LIFESTYLE VARIABLES
HOW MANY STANDARD DRINKS CONTAINING ALCOHOL DO YOU HAVE ON A TYPICAL DAY: PATIENT DOES NOT DRINK
HOW OFTEN DO YOU HAVE A DRINK CONTAINING ALCOHOL: NEVER

## 2024-11-22 ASSESSMENT — VISUAL ACUITY
OS: 20/200
OU: 20/50
OD: 20/200

## 2024-11-22 ASSESSMENT — PAIN SCALES - GENERAL: PAINLEVEL_OUTOF10: 0

## 2024-11-22 NOTE — CONSULTS
and fluid built up in my lungs\"    Erythromycin Nausea And Vomiting    Vicodin [Hydrocodone-Acetaminophen] Nausea And Vomiting       Family History   Problem Relation Age of Onset    Cancer Mother     High Blood Pressure Mother     High Blood Pressure Sister     High Cholesterol Sister        Social History     Tobacco Use   Smoking Status Former    Types: Cigars    Start date:     Quit date:     Years since quittin.9    Passive exposure: Past   Smokeless Tobacco Never     Social History     Substance and Sexual Activity   Drug Use No     Social History     Substance and Sexual Activity   Alcohol Use No       ROS:  Constitutional- No weight loss or fevers  Eyes- No diplopia. No photophobia.  Ears/nose/throat- No dysphagia. No Dysarthria  Cardiovascular- No palpitations. No chest pain  Respiratory- No dyspnea. No Cough  Gastrointestinal- No Abdominal pain. No Vomiting.  Genitourinary- No incontinence. No urinary retention  Musculoskeletal- No myalgia. No arthralgia  Skin- No rash. No easy bruising.  Psychiatric- No depression. No anxiety  Endocrine- No diabetes. No thyroid issues.  Hematologic- No bleeding difficulty. No fatigue  Neurologic- No weakness. No Headache.    Exam:  Vitals:    24 0845 24 1415 24 1456   BP: (!) 159/88 (!) 165/84 (!) 175/97   Pulse: 85     Resp: 18 18 18   Temp: 98.1 °F (36.7 °C) 97.7 °F (36.5 °C) 97.9 °F (36.6 °C)   TempSrc: Oral Oral Oral   SpO2: 94% 96% 98%   Weight: 99.3 kg (218 lb 14.7 oz)        Constitutional    Vital signs: BP, HR, and RR reviewed   General Alert, no distress, well-nourished  Eyes: unable to visualize the fundi  Cardiovascular: pulses symmetric in all 4 extremities.  No peripheral edema.  Psychiatric: cooperative with examination, no  psychotic behavior noted.  Neurologic  Mental status:   orientation to person, place, time and situation   General fund of knowledge:  grossly intact   Memory: Short term and long term intact   Attention  intact as able to attend well to the exam     Language fluent in conversation   Comprehension intact; follows simple commands  Cranial nerves:   CN2: Visual Fields full w/o extinction on confrontational testing with binoccular testing. With monoccular testing he does seem to have lower medial VF impairment bilaterally, right may be more pronounced then left.    CN 3,4,6: extraocular muscles intact, conjugate. PERRLA @ 1.5mm. Symmetric. No ptosis or nystagmus.   CN7: upper and lower facial symmetric without dysarthria  CN8: hearing grossly intact to conversation.  CN9/10: palate elevated symmetrically  CN12: tongue midline with protrusion. Able to move tongue side to side.   Strength:  BUE: 4+-5-/5 non focal  BLE: 4/5 non focal.   Sensory: light touch intact in all 4 extremities. No sensory extinction on double simultaneous stimulation  Cerebellar/coordination: finger nose finger normal without ataxia  Tone: normal in all 4 extremities      Labs  Na: 141  K: 4.2  BUN: 18  Creatinine: 0.9  Glucose: 140  Calcium: 9.0    ALT: 18  AST: 23    ESR: 84  CRP: 4.7    WBC: 7.4  RBC: 4.57  Hgb: 10.1  Hct: 32.7  Platelet: 420    Studies  MRI Brain w/ w/o; MRI Orbits w/ w/o 11/22/24:   1. Patient motion limits evaluation.  However, suggestion of asymmetrically  decreased caliber of the right optic nerve compared to the left with a  paucity of CSF within the right optic nerve sheath.  No convincing abnormal  enhancement seen of the optic nerves.  2. Otherwise, no acute abnormality seen the orbits.  3. There scratch the no acute intracranial abnormality.  No acute infarct.  4. There is a subcentimeter extra-axial enhancing mass within the left  posterior fossa likely representing a meningioma.  5. Minimal global parenchymal volume loss with minimal chronic microvascular  ischemic changes.    MRA head w/o 11/22/24:    MRA of the head without significant change compared to the CTA performed  earlier today.    MRV head w/ w/o

## 2024-11-22 NOTE — PROGRESS NOTES
Pharmacy Medication Reconciliation Note     List of medications patient is currently taking is complete.    Source of information:   1. Conversation with patient   2. EMR    Notes regarding home medications:   1. Patient did not take any of his home medication doses today before presenting to the ER.  2. Patient reports he has been using his albuterol inhaler twice daily.      Adriane Nam PharmD  11/22/2024 11:58 AM

## 2024-11-22 NOTE — PROGRESS NOTES
4 Eyes Skin Assessment     NAME:  Home Thorpe II  YOB: 1955  MEDICAL RECORD NUMBER:  [id number]    The patient is being assessed for  Admission    I agree that at least one RN has performed a thorough Head to Toe Skin Assessment on the patient. ALL assessment sites listed below have been assessed.      Areas assessed by both nurses:    Head, Face, Ears, Shoulders, Back, Chest, Arms, Elbows, Hands, Sacrum. Buttock, Coccyx, Ischium, and Legs. Feet and Heels        Does the Patient have a Wound? No noted wound(s)       William Prevention initiated by RN: Yes  Wound Care Orders initiated by RN: No    Pressure Injury (Stage 3,4, Unstageable, DTI, NWPT, and Complex wounds) if present, place Wound referral order by RN under : No    New Ostomies, if present place, Ostomy referral order under : No     Nurse 1 eSignature: Electronically signed by Citlalli Queen RN on 11/22/24 at 6:40 PM EST    **SHARE this note so that the co-signing nurse can place an eSignature**    Nurse 2 eSignature: Electronically signed by Olivia Veras RN on 11/22/24 at 7:34 PM EST

## 2024-11-22 NOTE — H&P
V2.0  History and Physical      Name:  Home Thorpe II /Age/Sex: 1955  (69 y.o. male)   MRN & CSN:  [id number] & [id number] Encounter Date/Time: 2024 11:00 AM EST   Location:  [address] [place] [address] PCP: Mariya Daily MD       Hospital Day: 1    Assessment and Plan:   Home Thorpe II is a 69 y.o. male with a pertinent PMHx of HTN, HLD, SABINO who presented to the ED complaining of bilateral vision loss which has been progressively worsening.    Bilateral vision loss  -Considering for possible giant cell arteritis versus ischemic stroke, was evaluated at [facility] yesterday reporting edematous right ophthalmic nerve and pale left ophthalmic nerve  -ESR elevated  -CT head nonacute, CTA head and neck without flow-limiting stenosis  -Check MRI brain with and without, MRA brain, MRV MRV brain per Neuro-Ophthalmology recommendations  -IV Solu-Medrol 1 g daily  -Aspirin 81 mg daily  -Atorvastatin 80 mg nightly  -Check lipid profile, hemoglobin A1c, sedimentation rate  -Monitor on telemetry  -Vascular surgery consulted for possible temporal artery biopsy  -Neurology consulted    Essential hypertension  -Continue home amlodipine    Chronic cough  -Symbicort    GERD  -Continue home PPI    Disposition:   Current Living situation: Home  Expected Disposition: TBD  Estimated D/C: 3 to 5 days    Diet Diet NPO   DVT Prophylaxis [x] Lovenox, []  Heparin, [] SCDs, [] Ambulation,  [] Eliquis, [] Xarelto, [] Coumadin   Code Status Full Code   Surrogate Decision Maker/ POA WifeLaura     Medical Decision Making:  The following items were considered in medical decision making:  Discussion of patient care with other providers, including ED provider via [employer] who recommended admission and  Reviewed clinical lab tests such as BMP, ESR, CBC, liver profile  Reviewed radiology tests  Reviewed other diagnostic tests/interventions     History from:     patient    History of Present Illness:     Chief Complaint: Blurry

## 2024-11-22 NOTE — CONSULTS
[facility]  Consultation Note    11/22/2024 11:28 AM    Chief Complaint: Bilateral Vision Changes     Reason for Consultation: Concern for GCA    History of Present Illness:  Patient is a 69 y.o. male who presented to the ED on 11/22/2024 with complaints of Bilateral Vision Changes. He has a significant PMH of HLD and HTN. The patient reports Left Eye blurriness which started a few days ago. His blurred vision progressed to affect his Right Eye and he was seen at [facility]. There was apparently concern for GCA and the patient was instructed to present to the ED. The patient tells me his Left Eye blurred vision is now beginning to improve; however, his Right Eye vision remains blurry. He denies any recent headaches and denies any temporal tenderness. We are consulted to evaluate the patient for possible Temporal Artery Biopsy. At present, the patient is seen resting in bed in the ED, he is alert and oriented and appears to be in stable condition.    Review of Systems  Review of Systems   Constitutional: Negative.    HENT: Negative.     Eyes:  Positive for visual disturbance (Bilateral blurred vision - Right now worse than Left).   Respiratory: Negative.     Cardiovascular: Negative.    Gastrointestinal: Negative.    Musculoskeletal: Negative.    Skin: Negative.    Neurological:  Negative for headaches.   Psychiatric/Behavioral: Negative.          Past Medical History:   Diagnosis Date    Hyperlipidemia     Hypertension     Recurrent left inguinal hernia     S/P colonoscopy 08/01/2008    Scrotal hernia     Sleep apnea     uses CPAP       Past Surgical History:   Procedure Laterality Date    COLONOSCOPY      COLONOSCOPY N/A 5/22/2023    COLONOSCOPY POLYPECTOMY SNARE/COLD BIOPSY performed by Kane Ji MD at [facility] ENDOSCOPY    ENDOSCOPY, COLON, DIAGNOSTIC      HERNIA REPAIR Left 09/27/2017    ATTEMPTED DAVINCI, CONVERTED OPEN RECURRENT LEFT INGUINAL hernia repair with mesh    INGUINAL  [facility]    Interpersonal Safety     Feel physically or emotionally unsafe where currently live: Not on file     Harm by anyone: Not on file     Emotionally Harmed: Not on file   Housing Stability: Unknown (9/11/2024)    Housing Stability Vital Sign     Unable to Pay for Housing in the Last Year: Not on file     Number of Times Moved in the Last Year: Not on file     Homeless in the Last Year: No       Family History   Problem Relation Age of Onset    Cancer Mother     High Blood Pressure Mother     High Blood Pressure Sister     High Cholesterol Sister        Vital Signs  Vitals:    11/22/24 0845   BP: (!) 159/88   Pulse: 85   Resp: 18   Temp: 98.1 °F (36.7 °C)   TempSrc: Oral   SpO2: 94%   Weight: 99.3 kg (218 lb 14.7 oz)       I/O:  No intake or output data in the 24 hours ending 11/22/24 1128    Physical Exam:  General: no apparent distress, alert and oriented, afebrile  Psychiatric: mood and affect are within normal limits  Head/Eyes/Ears/Nose/Throat: normocephalic and atraumatic, face symmetric, normal hearing, nose - negative, temporal arteries easily palpable bilaterally, no evidence or reports of tenderness noted with palpation  Neck: normal appearance and no deformity, supple, trachea midline  Chest/Lungs: non labored breathing no tachypnea, retractions or cyanosis  Cardiac: Heart regular rate and rhythm  Extremities: normal strength, tone, and muscle mass, no deformities     Labs  Lab Results   Component Value Date/Time    WBC 7.4 11/22/2024 09:29 AM    HGB 10.1 11/22/2024 09:29 AM    HCT 32.7 11/22/2024 09:29 AM    MCV 71.6 11/22/2024 09:29 AM     11/22/2024 09:29 AM     Lab Results   Component Value Date/Time     11/22/2024 09:29 AM    K 4.2 11/22/2024 09:29 AM     11/22/2024 09:29 AM    CO2 23 11/22/2024 09:29 AM    PHOS 3.1 10/22/2010 09:29 AM    BUN 18 11/22/2024 09:29 AM    CREATININE 0.9 11/22/2024 09:29 AM

## 2024-11-22 NOTE — PROGRESS NOTES
Occupational Therapy  Facility/Department: [facility] 5 PROGRESSIVE CARE  Occupational Therapy Initial Assessment & Discharge Summary    Name: Home Thorpe II  : 1955  MRN: [id number]  Date of Service: 2024    Discharge Recommendations:  Home with assist PRN  OT Equipment Recommendations  Equipment Needed: No     Home Thorpe II scored a 24/24 on the AM-PAC ADL Inpatient form. Current research shows that an AM-PAC score of 18 or greater is typically associated with a discharge to the patient's home setting.  Please see assessment section for further patient specific details.    If patient discharges prior to next session this note will serve as a discharge summary.  Please see below for the latest assessment towards goals.      Patient Diagnosis(es): The encounter diagnosis was Vision changes.  Past Medical History:  has a past medical history of Hyperlipidemia, Hypertension, Recurrent left inguinal hernia, S/P colonoscopy, Scrotal hernia, and Sleep apnea.  Past Surgical History:  has a past surgical history that includes Tonsillectomy; Inguinal hernia repair (2017); Colonoscopy; Endoscopy, colon, diagnostic; hernia repair (Left, 2017); and Colonoscopy (N/A, 2023).           Assessment  Assessment: Pt is a 70 yo M admitted with luis alfredo vision loss. CTA Head/Neck - 2024 IMPRESSION: 1. No flow limiting stenosis seen of the cervical carotid/vertebral arteries. 2. No significant stenosis or large vessel occlusion of the [id number]. PMH including: HTN, HLD, SABINO. PTA - pt lives in apt with his wife and stepdtr, ALYSIA, typically fully IND without device, driving and working full time. Today - pt completed bed mobility mod I, functional txs sup, amb without device household distances SBA/sup, no LOB, denies dizziness throughout. Grooming sup. Toileting sup. Footwear IND. Anticipate pt mod I full ADL completion based on observed strength, balance, activity tolerance. Pt A+Ox4,

## 2024-11-22 NOTE — PLAN OF CARE
Problem: Discharge Planning  Goal: Discharge to home or other facility with appropriate resources  Outcome: Progressing  Flowsheets (Taken 11/22/2024 1382)  Discharge to home or other facility with appropriate resources: Identify barriers to discharge with patient and caregiver     Problem: Pain  Goal: Verbalizes/displays adequate comfort level or baseline comfort level  Outcome: Progressing     Problem: Chronic Conditions and Co-morbidities  Goal: Patient's chronic conditions and co-morbidity symptoms are monitored and maintained or improved  Outcome: Progressing

## 2024-11-22 NOTE — ED NOTES
Pt family concerned with pt and possible mental decline and changes in mental status over the last year.  Pt has periods where he is forgetful and talks to himself an inordinate amount of time.  Chronic fatigue.

## 2024-11-22 NOTE — ED PROVIDER NOTES
[facility] EMERGENCY DEPARTMENT    EMERGENCY DEPARTMENT ENCOUNTER        Patient Name: Home Thorpe II  MRN: [id number]  Birthdate 1955  Date of evaluation: 11/22/2024  PCP: Mariya Daily MD  Note Started: 9:06 AM EST 11/22/24    CHIEF COMPLAINT       Illness (Pt states \" I was sent for a emergent MRI from [facility]\"  pt states, blurry vision\" )      HISTORY OF PRESENT ILLNESS: 1 or more Elements       Home Thorpe II is a 69 y.o. male who presents to the emergency department for blurry vision.  Patient reports having blurry vision in the left eye for about a month that acutely worsened over the past week.  Reports his vision has been blurry on the right since yesterday as well.  He was seen at [facility] yesterday.  Wife says they had labs drawn after the appointment and they received a phone call this morning telling him to come to the closest ER for an emergent MRI.    No other complaints, modifying factors or associated symptoms.     History obtained by the patient unless stated otherwise as above on HPI.   No limitations unless specified as above on HPI.     Past medical history:   Past Medical History:   Diagnosis Date    Hyperlipidemia     Hypertension     Recurrent left inguinal hernia     S/P colonoscopy 08/01/2008    Scrotal hernia     Sleep apnea     uses CPAP       Past surgical history:   Past Surgical History:   Procedure Laterality Date    COLONOSCOPY      COLONOSCOPY N/A 5/22/2023    COLONOSCOPY POLYPECTOMY SNARE/COLD BIOPSY performed by Kane Ji MD at [facility] ENDOSCOPY    ENDOSCOPY, COLON, DIAGNOSTIC      HERNIA REPAIR Left 09/27/2017    ATTEMPTED DAVINCI, CONVERTED OPEN RECURRENT LEFT INGUINAL hernia repair with mesh    INGUINAL HERNIA REPAIR  08/09/2017    DAVINCI LEFT INGUINAL HERNIA REPAIR WITH MESH    TONSILLECTOMY         Home medications:   Prior to Admission medications    Medication Sig Start Date End Date Taking? Authorizing Provider   Magnesium Citrate  Appropriate PPE worn by ME during patient encounters. Patient was cared for during a time with constrained hospital bed capacity with nationwide stress on resources and staffing.      (This chart was generated in part by using Dragon Dictation system and may contain errors related to that system including errors in grammar, punctuation, and spelling, as well as words and phrases that may be inappropriate. If there are any questions or concerns please feel free to contact the dictating provider for clarification.)          Cathy Flowers MD  11/22/24 7971

## 2024-11-23 LAB
ANION GAP SERPL CALCULATED.3IONS-SCNC: 7 MMOL/L (ref 3–16)
BASOPHILS # BLD: 0 K/UL (ref 0–0.2)
BASOPHILS NFR BLD: 0.2 %
BUN SERPL-MCNC: 16 MG/DL (ref 7–20)
CALCIUM SERPL-MCNC: 8.8 MG/DL (ref 8.3–10.6)
CHLORIDE SERPL-SCNC: 104 MMOL/L (ref 99–110)
CHOLEST SERPL-MCNC: 155 MG/DL (ref 0–199)
CO2 SERPL-SCNC: 22 MMOL/L (ref 21–32)
CREAT SERPL-MCNC: 0.8 MG/DL (ref 0.8–1.3)
CRP SERPL-MCNC: 4.2 MG/L (ref 0–5.1)
DEPRECATED RDW RBC AUTO: 16.5 % (ref 12.4–15.4)
EOSINOPHIL # BLD: 0 K/UL (ref 0–0.6)
EOSINOPHIL NFR BLD: 0 %
ERYTHROCYTE [SEDIMENTATION RATE] IN BLOOD BY WESTERGREN METHOD: 67 MM/HR (ref 0–20)
EST. AVERAGE GLUCOSE BLD GHB EST-MCNC: 128.4 MG/DL
FOLATE SERPL-MCNC: 12.7 NG/ML (ref 4.78–24.2)
GFR SERPLBLD CREATININE-BSD FMLA CKD-EPI: >90 ML/MIN/{1.73_M2}
GLUCOSE SERPL-MCNC: 130 MG/DL (ref 70–99)
HBA1C MFR BLD: 6.1 %
HCT VFR BLD AUTO: 31.8 % (ref 40.5–52.5)
HDLC SERPL-MCNC: 39 MG/DL (ref 40–60)
HGB BLD-MCNC: 9.9 G/DL (ref 13.5–17.5)
HIV 1+2 AB+HIV1 P24 AG SERPL QL IA: NORMAL
HIV 2 AB SERPL QL IA: NORMAL
HIV1 AB SERPL QL IA: NORMAL
HIV1 P24 AG SERPL QL IA: NORMAL
LDLC SERPL CALC-MCNC: 100 MG/DL
LYMPHOCYTES # BLD: 1.4 K/UL (ref 1–5.1)
LYMPHOCYTES NFR BLD: 14.2 %
MCH RBC QN AUTO: 21.9 PG (ref 26–34)
MCHC RBC AUTO-ENTMCNC: 31 G/DL (ref 31–36)
MCV RBC AUTO: 70.6 FL (ref 80–100)
MONOCYTES # BLD: 0.8 K/UL (ref 0–1.3)
MONOCYTES NFR BLD: 8 %
NEUTROPHILS # BLD: 7.5 K/UL (ref 1.7–7.7)
NEUTROPHILS NFR BLD: 77.6 %
PLATELET # BLD AUTO: 414 K/UL (ref 135–450)
PMV BLD AUTO: 8.3 FL (ref 5–10.5)
POTASSIUM SERPL-SCNC: 4.3 MMOL/L (ref 3.5–5.1)
RBC # BLD AUTO: 4.51 M/UL (ref 4.2–5.9)
REAGIN+T PALLIDUM IGG+IGM SERPL-IMP: NORMAL
SODIUM SERPL-SCNC: 133 MMOL/L (ref 136–145)
TRIGL SERPL-MCNC: 82 MG/DL (ref 0–150)
TSH SERPL DL<=0.005 MIU/L-ACNC: 0.93 UIU/ML (ref 0.27–4.2)
VIT B12 SERPL-MCNC: 304 PG/ML (ref 211–911)
VLDLC SERPL CALC-MCNC: 16 MG/DL
WBC # BLD AUTO: 9.7 K/UL (ref 4–11)

## 2024-11-23 PROCEDURE — 80048 BASIC METABOLIC PNL TOTAL CA: CPT

## 2024-11-23 PROCEDURE — 6360000002 HC RX W HCPCS: Performed by: STUDENT IN AN ORGANIZED HEALTH CARE EDUCATION/TRAINING PROGRAM

## 2024-11-23 PROCEDURE — 97116 GAIT TRAINING THERAPY: CPT

## 2024-11-23 PROCEDURE — 86780 TREPONEMA PALLIDUM: CPT

## 2024-11-23 PROCEDURE — 85652 RBC SED RATE AUTOMATED: CPT

## 2024-11-23 PROCEDURE — 94640 AIRWAY INHALATION TREATMENT: CPT

## 2024-11-23 PROCEDURE — 2580000003 HC RX 258: Performed by: STUDENT IN AN ORGANIZED HEALTH CARE EDUCATION/TRAINING PROGRAM

## 2024-11-23 PROCEDURE — 82746 ASSAY OF FOLIC ACID SERUM: CPT

## 2024-11-23 PROCEDURE — 94760 N-INVAS EAR/PLS OXIMETRY 1: CPT

## 2024-11-23 PROCEDURE — 6370000000 HC RX 637 (ALT 250 FOR IP): Performed by: STUDENT IN AN ORGANIZED HEALTH CARE EDUCATION/TRAINING PROGRAM

## 2024-11-23 PROCEDURE — 97161 PT EVAL LOW COMPLEX 20 MIN: CPT

## 2024-11-23 PROCEDURE — 80061 LIPID PANEL: CPT

## 2024-11-23 PROCEDURE — 86140 C-REACTIVE PROTEIN: CPT

## 2024-11-23 PROCEDURE — 2060000000 HC ICU INTERMEDIATE R&B

## 2024-11-23 PROCEDURE — 36415 COLL VENOUS BLD VENIPUNCTURE: CPT

## 2024-11-23 PROCEDURE — 86702 HIV-2 ANTIBODY: CPT

## 2024-11-23 PROCEDURE — 85025 COMPLETE CBC W/AUTO DIFF WBC: CPT

## 2024-11-23 PROCEDURE — 86701 HIV-1ANTIBODY: CPT

## 2024-11-23 PROCEDURE — 83036 HEMOGLOBIN GLYCOSYLATED A1C: CPT

## 2024-11-23 PROCEDURE — 87390 HIV-1 AG IA: CPT

## 2024-11-23 PROCEDURE — 82607 VITAMIN B-12: CPT

## 2024-11-23 PROCEDURE — 84443 ASSAY THYROID STIM HORMONE: CPT

## 2024-11-23 RX ORDER — ATORVASTATIN CALCIUM 10 MG/1
10 TABLET, FILM COATED ORAL NIGHTLY
Status: DISCONTINUED | OUTPATIENT
Start: 2024-11-23 | End: 2024-11-25 | Stop reason: HOSPADM

## 2024-11-23 RX ADMIN — AMLODIPINE BESYLATE 10 MG: 10 TABLET ORAL at 09:40

## 2024-11-23 RX ADMIN — BUDESONIDE AND FORMOTEROL FUMARATE DIHYDRATE 2 PUFF: 160; 4.5 AEROSOL RESPIRATORY (INHALATION) at 19:33

## 2024-11-23 RX ADMIN — SODIUM CHLORIDE, PRESERVATIVE FREE 10 ML: 5 INJECTION INTRAVENOUS at 20:20

## 2024-11-23 RX ADMIN — ENOXAPARIN SODIUM 40 MG: 100 INJECTION SUBCUTANEOUS at 20:07

## 2024-11-23 RX ADMIN — PANTOPRAZOLE SODIUM 40 MG: 40 TABLET, DELAYED RELEASE ORAL at 06:37

## 2024-11-23 RX ADMIN — SODIUM CHLORIDE, PRESERVATIVE FREE 10 ML: 5 INJECTION INTRAVENOUS at 09:40

## 2024-11-23 RX ADMIN — BUDESONIDE AND FORMOTEROL FUMARATE DIHYDRATE 2 PUFF: 160; 4.5 AEROSOL RESPIRATORY (INHALATION) at 07:41

## 2024-11-23 RX ADMIN — ATORVASTATIN CALCIUM 10 MG: 10 TABLET, FILM COATED ORAL at 20:07

## 2024-11-23 RX ADMIN — SODIUM CHLORIDE 1000 MG: 9 INJECTION, SOLUTION INTRAVENOUS at 09:49

## 2024-11-23 NOTE — PLAN OF CARE
Problem: Discharge Planning  Goal: Discharge to home or other facility with appropriate resources  11/23/2024 0324 by Olivia Veras, RN  Outcome: Progressing     Problem: Pain  Goal: Verbalizes/displays adequate comfort level or baseline comfort level  11/23/2024 0324 by Olivia Veras, RN  Outcome: Progressing     Problem: Chronic Conditions and Co-morbidities  Goal: Patient's chronic conditions and co-morbidity symptoms are monitored and maintained or improved  11/23/2024 0324 by Olivia Veras, RN  Outcome: Progressing

## 2024-11-23 NOTE — PROGRESS NOTES
Progress Note  The patient is being evaluated for vision loss.     Updates    Initially denies any changes to vision but later states its more \"shadow\" like.   No headache. No new symptoms otherwise.   Day #2 of IV solumedrol.       Active Ambulatory Problems     Diagnosis Date Noted    Hypertension 02/11/2010    Hyperlipidemia 02/11/2010    Insomnia 03/29/2012    Allergic rhinitis 06/04/2013    Scrotal hernia 07/25/2017    Recurrent inguinal hernia of left side without obstruction or gangrene 09/15/2017    SABINO (obstructive sleep apnea) 01/09/2023    Treatment-emergent central sleep apnea 01/30/2023    Giant cell arteritis (HCC) 11/22/2024     Resolved Ambulatory Problems     Diagnosis Date Noted    Bronchitis 12/13/2011    Acute sinusitis 02/27/2013    Postop check 08/22/2017     Past Medical History:   Diagnosis Date    Recurrent left inguinal hernia     S/P colonoscopy 08/01/2008    Sleep apnea        Current Facility-Administered Medications:     atorvastatin (LIPITOR) tablet 10 mg, 10 mg, Oral, Nightly, Mraia R Kernnas I, DO    sodium chloride flush 0.9 % injection 5-40 mL, 5-40 mL, IntraVENous, 2 times per day, Lit Kern I, DO, 10 mL at 11/23/24 0940    sodium chloride flush 0.9 % injection 5-40 mL, 5-40 mL, IntraVENous, PRN, Lit Kern I, DO    0.9 % sodium chloride infusion, , IntraVENous, PRN, Erlin, Lit I, DO    potassium chloride (KLOR-CON M) extended release tablet 40 mEq, 40 mEq, Oral, PRN **OR** potassium bicarb-citric acid (EFFER-K) effervescent tablet 40 mEq, 40 mEq, Oral, PRN **OR** potassium chloride 10 mEq/100 mL IVPB (Peripheral Line), 10 mEq, IntraVENous, PRN, Erlin Lit I, DO    magnesium sulfate 2000 mg in 50 mL IVPB premix, 2,000 mg, IntraVENous, PRN, Maria R Kernnas I, DO    enoxaparin (LOVENOX) injection 40 mg, 40 mg, SubCUTAneous, QHS, Lit Kern I, DO, 40 mg at 11/22/24 2129    ondansetron (ZOFRAN-ODT) disintegrating tablet 4 mg, 4 mg, Oral, Q8H PRN **OR** ondansetron

## 2024-11-23 NOTE — PLAN OF CARE
Problem: Discharge Planning  Goal: Discharge to home or other facility with appropriate resources  11/23/2024 1242 by Sulma Garcia RN  Outcome: Progressing     Problem: Pain  Goal: Verbalizes/displays adequate comfort level or baseline comfort level  11/23/2024 1242 by Sulma Garcia RN  Outcome: Progressing     Problem: Chronic Conditions and Co-morbidities  Goal: Patient's chronic conditions and co-morbidity symptoms are monitored and maintained or improved  11/23/2024 1242 by Sulma Garcia, RN  Outcome: Progressing

## 2024-11-23 NOTE — PROGRESS NOTES
Tolerance  Activity Tolerance: Patient tolerated evaluation without incident    Plan  Physical Therapy Plan  General Plan: Discharge with evaluation only  Safety Devices  Type of Devices: Call light within reach, Bed alarm in place, Left in bed  Restraints  Restraints Initially in Place: No    Restrictions  Restrictions/Precautions  Restrictions/Precautions: Up as Tolerated  Position Activity Restriction  Other position/activity restrictions: telemetry     Subjective  General  Chart Reviewed: Yes  Patient assessed for rehabilitation services?: Yes  Additional Pertinent Hx: Ashok Hernández, APRN \"70 yo M who was admitted for blurry vision.  He has had fluctuating blurry vision in his left eye for the last many weeks possibly longer.  It has been worsening for the last week.  He developed new right sided blurry vision that was new 2 days ago.  He was seen at [facility] yesterday for which he had unspecified lab work and he was notified to go to the closed ED.  Per wife they were concerned for giant cell arteritis.\"  Response To Previous Treatment: Not applicable  Family / Caregiver Present: No  Referring Practitioner: Lit Kern DO  Referral Date : 11/22/24  Diagnosis: luis alfredo vision loss  Follows Commands: Within Functional Limits  Subjective  Subjective: Pt in bed upon arrival.  Reports fatigue but denies pain.  Reports blurry vision is the same on both sides.  Agreeable to PT eval and treat.         Social/Functional History  Social/Functional History  Lives With: Spouse, Daughter  Type of Home: Apartment  Home Layout: Performs ADL's on one level  Home Access: Stairs to enter with rails  Entrance Stairs - Number of Steps: 2 ALYSIA with handrail.  flight of steps down to apt unit with handrail.  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  Daughter is on disability.  Vision/Hearing  Vision  Vision: Impaired  Vision Exceptions: Wears glasses at all times  Hearing  Hearing: Within functional limits    Cognition   Orientation  Overall Orientation Status: Within Functional Limits  Orientation Level: Oriented X4  Cognition  Overall Cognitive Status: WFL    Objective        Gross Assessment  Sensation: Intact    AROM RLE (degrees)  RLE AROM: WFL  AROM LLE (degrees)  LLE AROM : WFL  Strength RLE  Strength RLE: WFL  Strength LLE  Strength LLE: WFL           Bed mobility  Supine to Sit: Modified independent  Sit to Supine: Modified independent  Scooting: Modified independent  Bed Mobility Comments: HOB elevated    Transfers  Sit to Stand: Modified independent  Stand to Sit: Modified independent    Ambulation  Surface: Level tile  Device: No Device  Assistance: Supervision  Quality of Gait: somewhat guarded but improved with distance, no LOB, arm swing increased with cuing  Gait Deviations: Slow Evonne;Decreased arm swing  Distance: 10' and then approx 350' after standing to urinate  More Ambulation?: No  Stairs/Curb  Stairs?: No     Balance  Posture: Fair  Sitting - Static: Good  Sitting - Dynamic: Good  Standing - Static: Good  Standing - Dynamic: Good        AM-PAC - Mobility    AM-PAC Basic Mobility - Inpatient   How much help is needed turning from your back to your side while in a flat bed without using bedrails?: None  How much help is needed moving from lying on your back to sitting on the side of a flat

## 2024-11-23 NOTE — PROGRESS NOTES
V2.0    [facility] [place] [place] Progress Note      Name:  Home Thorpe II /Age/Sex: 1955  (69 y.o. male)   MRN & CSN:  [id number] & [id number] Encounter Date/Time: 2024 1:19 PM EST   Location:  [id number] PCP: Mariya Daily MD     Attending:Lit Kern DO       Hospital Day: 2    Assessment and Recommendations   Brief Hospital Course  Home Thorpe II is a 69 y.o. male with pertinent PMHx of HTN, HLD, SABINO who presented to the ED complaining of worsening bilateral vision loss.  He was recently evaluated at [facility] with concern for GCA.    Bilateral vision  -Concerning for giant cell arteritis  -ESR elevated, CRP wnl  -MRI showing decreased caliber of the right optic nerve, no evidence of acute infarct  -Discontinue aspirin and statin given the concern for infarct  -IV Solu-Medrol 1 g daily for 3 days with plan to transition to prednisone thereafter, currently on day 2  -Ophthalmology consulted, recommending IV steroids and outpatient follow-up  -Vascular surgery consulted, planning for temporal artery biopsy on Monday  -Neurology following, note reviewed today , recommending continuing steroids    Essential hypertension  -Amlodipine    Chronic cough  -Symbicort    GERD  -PPI    Left posterior fossa mass  -Incidental finding, concerning for meningioma, outpatient follow-up with neurosurgery    Diet ADULT DIET; Regular   DVT Prophylaxis [x] Lovenox, []  Heparin, [] SCDs, [] Ambulation,  [] Eliquis, [] Xarelto  [] Coumadin   Code Status Full Code   Disposition From: Home  Expected Disposition: Home  Estimated Date of Discharge:            Subjective:     Chief Complaint: Bilateral vision loss    Patient was seen and examined today sitting up in bed  Vitals reviewed, labs reviewed, nursing notes reviewed  Says his vision is about the same, describes it as a overall haziness, no black spots  Denies any chest pain or palpitations  Not having any pain anywhere    Review of Systems:   parenchymal volume loss.  Otherwise, the ventricles and sulci are normal in size and configuration.  The sellar/suprasellar regions appear unremarkable. No abnormal focus of enhancement is seen within the brain. SINUSES: The visualized paranasal sinuses and mastoid air cells are well aerated. BONES/SOFT TISSUES: The bone marrow signal intensity appears normal. The soft tissues demonstrate no acute abnormality. MRI ORBITS: The lenses are normally located.  No abnormality is seen of the globes. There appears to be asymmetrically decreased caliber of the right optic nerve compared to the left with a paucity of CSF within the right optic nerve sheath (coronal T2 fat suppressed series 8, image 12).  No convincing abnormal enhancement is seen of the optic nerves.  The extraocular muscles appear symmetric.  No discrete orbital mass seen. MRA HEAD: ANTERIOR CIRCULATION: No significant stenosis of the intracranial internal carotid, anterior cerebral, or middle cerebral arteries. POSTERIOR CIRCULATION: Diffusely diminutive caliber of the right vertebral artery.  No significant stenosis of the vertebral, basilar, or posterior cerebral arteries. No intracranial aneurysm identified. MRV HEAD: The major dural venous sinuses are patent without evidence of a thrombus.     1. Patient motion limits evaluation.  However, suggestion of asymmetrically decreased caliber of the right optic nerve compared to the left with a paucity of CSF within the right optic nerve sheath.  No convincing abnormal enhancement seen of the optic nerves. 2. Otherwise, no acute abnormality seen the orbits. 3. There scratch the no acute intracranial abnormality.  No acute infarct. 4. There is a subcentimeter extra-axial enhancing mass within the left posterior fossa likely representing a meningioma. 5. Minimal global parenchymal volume loss with minimal chronic microvascular ischemic changes. 6. MRA of the head without significant change compared to the CTA

## 2024-11-23 NOTE — PROGRESS NOTES
Dr Maria M Ambriz, ophthalmologist at [facility], answered consult call. She said the plan is for patient to receive 3 days of IV solumedrol, 1g, and then patient can return to care at [facility]. She will be available via her cell phone over the weekend if there are any questions - [phone].

## 2024-11-24 PROCEDURE — 6370000000 HC RX 637 (ALT 250 FOR IP): Performed by: STUDENT IN AN ORGANIZED HEALTH CARE EDUCATION/TRAINING PROGRAM

## 2024-11-24 PROCEDURE — 6360000002 HC RX W HCPCS: Performed by: STUDENT IN AN ORGANIZED HEALTH CARE EDUCATION/TRAINING PROGRAM

## 2024-11-24 PROCEDURE — 94760 N-INVAS EAR/PLS OXIMETRY 1: CPT

## 2024-11-24 PROCEDURE — 2580000003 HC RX 258: Performed by: STUDENT IN AN ORGANIZED HEALTH CARE EDUCATION/TRAINING PROGRAM

## 2024-11-24 PROCEDURE — 94640 AIRWAY INHALATION TREATMENT: CPT

## 2024-11-24 PROCEDURE — 2060000000 HC ICU INTERMEDIATE R&B

## 2024-11-24 RX ORDER — PREDNISONE 20 MG/1
40 TABLET ORAL DAILY
Status: DISCONTINUED | OUTPATIENT
Start: 2024-11-25 | End: 2024-11-25 | Stop reason: HOSPADM

## 2024-11-24 RX ORDER — SODIUM CHLORIDE 0.9 % (FLUSH) 0.9 %
5-40 SYRINGE (ML) INJECTION EVERY 12 HOURS SCHEDULED
Status: DISCONTINUED | OUTPATIENT
Start: 2024-11-24 | End: 2024-11-24

## 2024-11-24 RX ORDER — SODIUM CHLORIDE 9 MG/ML
INJECTION, SOLUTION INTRAVENOUS PRN
Status: DISCONTINUED | OUTPATIENT
Start: 2024-11-24 | End: 2024-11-24

## 2024-11-24 RX ORDER — MINERAL OIL/HYDROPHIL PETROLAT
OINTMENT (GRAM) TOPICAL 2 TIMES DAILY PRN
Status: DISCONTINUED | OUTPATIENT
Start: 2024-11-24 | End: 2024-11-25 | Stop reason: HOSPADM

## 2024-11-24 RX ORDER — SODIUM CHLORIDE 0.9 % (FLUSH) 0.9 %
5-40 SYRINGE (ML) INJECTION PRN
Status: DISCONTINUED | OUTPATIENT
Start: 2024-11-24 | End: 2024-11-24

## 2024-11-24 RX ADMIN — SODIUM CHLORIDE, PRESERVATIVE FREE 10 ML: 5 INJECTION INTRAVENOUS at 09:29

## 2024-11-24 RX ADMIN — ATORVASTATIN CALCIUM 10 MG: 10 TABLET, FILM COATED ORAL at 20:48

## 2024-11-24 RX ADMIN — AMLODIPINE BESYLATE 10 MG: 10 TABLET ORAL at 09:28

## 2024-11-24 RX ADMIN — BUDESONIDE AND FORMOTEROL FUMARATE DIHYDRATE 2 PUFF: 160; 4.5 AEROSOL RESPIRATORY (INHALATION) at 07:33

## 2024-11-24 RX ADMIN — PANTOPRAZOLE SODIUM 40 MG: 40 TABLET, DELAYED RELEASE ORAL at 05:51

## 2024-11-24 RX ADMIN — BUDESONIDE AND FORMOTEROL FUMARATE DIHYDRATE 2 PUFF: 160; 4.5 AEROSOL RESPIRATORY (INHALATION) at 19:56

## 2024-11-24 RX ADMIN — SODIUM CHLORIDE, PRESERVATIVE FREE 10 ML: 5 INJECTION INTRAVENOUS at 20:48

## 2024-11-24 RX ADMIN — SODIUM CHLORIDE 1000 MG: 9 INJECTION, SOLUTION INTRAVENOUS at 09:36

## 2024-11-24 ASSESSMENT — PAIN SCALES - GENERAL: PAINLEVEL_OUTOF10: 0

## 2024-11-24 NOTE — PROGRESS NOTES
V2.0    [facility]      Name:  Home Thorpe II /Age/Sex: 1955  (69 y.o. male)   MRN & CSN:  [id number] & [id number] Encounter Date/Time: 2024 1:19 PM EST   Location:  [id number] PCP: Mariya Daily MD     Attending:Lit Kern DO       Hospital Day: 3    Assessment and Recommendations   Brief Hospital Course  Home Thorpe II is a 69 y.o. male with pertinent PMHx of HTN, HLD, SABINO who presented to the ED complaining of worsening bilateral vision loss.  He was recently evaluated at [facility] with concern for GCA.    Bilateral vision loss  Giant cell arteritis  -ESR elevated, CRP wnl  -MRI showing decreased caliber of the right optic nerve, no evidence of acute infarct  -Complete day 3 of IV Solu-Medrol 1 g   -Ophthalmology consulted, recommending IV steroids and outpatient follow-up  -Vascular surgery consulted, planning for temporal artery biopsy on Monday  -Neurology following, recommending continuing steroids    Essential hypertension  -Amlodipine    Chronic cough  -Symbicort    GERD  -PPI    Left posterior fossa mass  -Incidental finding, concerning for meningioma, outpatient follow-up with neurosurgery    Diet Diet NPO Exceptions are: Sips of Water with Meds  ADULT DIET; Regular   DVT Prophylaxis [x] Lovenox, []  Heparin, [] SCDs, [] Ambulation,  [] Eliquis, [] Xarelto  [] Coumadin   Code Status Full Code   Disposition From: Home  Expected Disposition: Home  Estimated Date of Discharge:            Subjective:     Chief Complaint: Bilateral vision loss    Patient was seen and examined today sitting up in bed  Labs reviewed, vitals reviewed, nursing notes reviewed  Says that his vision is a little bit better today, still blurry says it feels like he is looking through very dark sunglasses  Denies any chest pain or shortness of breath  Denies any fevers  No pain    Review of Systems:      Pertinent positives and negatives discussed in HPI    Objective:

## 2024-11-24 NOTE — PLAN OF CARE
Problem: Discharge Planning  Goal: Discharge to home or other facility with appropriate resources  11/23/2024 2137 by Madeline Fontanez RN  Outcome: Progressing  11/23/2024 1242 by Sulma Garcia RN  Outcome: Progressing     Problem: Pain  Goal: Verbalizes/displays adequate comfort level or baseline comfort level  11/23/2024 2137 by Madeline Fontanez RN  Outcome: Progressing  11/23/2024 1242 by Sulma Garcia RN  Outcome: Progressing     Problem: Chronic Conditions and Co-morbidities  Goal: Patient's chronic conditions and co-morbidity symptoms are monitored and maintained or improved  11/23/2024 2137 by Madeline Fontanez RN  Outcome: Progressing  11/23/2024 1242 by Sulma Garcia RN  Outcome: Progressing     Problem: Safety - Adult  Goal: Free from fall injury  Outcome: Progressing

## 2024-11-25 ENCOUNTER — ANESTHESIA EVENT (OUTPATIENT)
Dept: OPERATING ROOM | Age: 69
DRG: 516 | End: 2024-11-25
Payer: MEDICARE

## 2024-11-25 ENCOUNTER — ANESTHESIA (OUTPATIENT)
Dept: OPERATING ROOM | Age: 69
DRG: 516 | End: 2024-11-25
Payer: MEDICARE

## 2024-11-25 VITALS
TEMPERATURE: 98.3 F | SYSTOLIC BLOOD PRESSURE: 127 MMHG | OXYGEN SATURATION: 92 % | HEIGHT: 65 IN | DIASTOLIC BLOOD PRESSURE: 72 MMHG | WEIGHT: 215.17 LBS | BODY MASS INDEX: 35.85 KG/M2 | RESPIRATION RATE: 16 BRPM | HEART RATE: 88 BPM

## 2024-11-25 PROBLEM — H53.9 VISION CHANGES: Status: ACTIVE | Noted: 2024-11-25

## 2024-11-25 PROCEDURE — 37609 LIGATION/BX TEMPORAL ARTERY: CPT | Performed by: SURGERY

## 2024-11-25 PROCEDURE — 6360000002 HC RX W HCPCS: Performed by: SURGERY

## 2024-11-25 PROCEDURE — 88313 SPECIAL STAINS GROUP 2: CPT

## 2024-11-25 PROCEDURE — 03BS0ZX EXCISION OF RIGHT TEMPORAL ARTERY, OPEN APPROACH, DIAGNOSTIC: ICD-10-PCS | Performed by: SURGERY

## 2024-11-25 PROCEDURE — 2709999900 HC NON-CHARGEABLE SUPPLY: Performed by: SURGERY

## 2024-11-25 PROCEDURE — A4217 STERILE WATER/SALINE, 500 ML: HCPCS | Performed by: SURGERY

## 2024-11-25 PROCEDURE — 94640 AIRWAY INHALATION TREATMENT: CPT

## 2024-11-25 PROCEDURE — 3600000003 HC SURGERY LEVEL 3 BASE: Performed by: SURGERY

## 2024-11-25 PROCEDURE — 3600000013 HC SURGERY LEVEL 3 ADDTL 15MIN: Performed by: SURGERY

## 2024-11-25 PROCEDURE — 6370000000 HC RX 637 (ALT 250 FOR IP): Performed by: SURGERY

## 2024-11-25 PROCEDURE — 3700000000 HC ANESTHESIA ATTENDED CARE: Performed by: SURGERY

## 2024-11-25 PROCEDURE — 94760 N-INVAS EAR/PLS OXIMETRY 1: CPT

## 2024-11-25 PROCEDURE — 2580000003 HC RX 258: Performed by: SURGERY

## 2024-11-25 PROCEDURE — 3700000001 HC ADD 15 MINUTES (ANESTHESIA): Performed by: SURGERY

## 2024-11-25 PROCEDURE — 88305 TISSUE EXAM BY PATHOLOGIST: CPT

## 2024-11-25 PROCEDURE — 7100000000 HC PACU RECOVERY - FIRST 15 MIN: Performed by: SURGERY

## 2024-11-25 PROCEDURE — 7100000001 HC PACU RECOVERY - ADDTL 15 MIN: Performed by: SURGERY

## 2024-11-25 PROCEDURE — 6360000002 HC RX W HCPCS

## 2024-11-25 PROCEDURE — 03BT0ZX EXCISION OF LEFT TEMPORAL ARTERY, OPEN APPROACH, DIAGNOSTIC: ICD-10-PCS | Performed by: SURGERY

## 2024-11-25 RX ORDER — DEXAMETHASONE SODIUM PHOSPHATE 4 MG/ML
INJECTION, SOLUTION INTRA-ARTICULAR; INTRALESIONAL; INTRAMUSCULAR; INTRAVENOUS; SOFT TISSUE
Status: DISCONTINUED | OUTPATIENT
Start: 2024-11-25 | End: 2024-11-25 | Stop reason: SDUPTHER

## 2024-11-25 RX ORDER — ONDANSETRON 2 MG/ML
INJECTION INTRAMUSCULAR; INTRAVENOUS
Status: DISCONTINUED | OUTPATIENT
Start: 2024-11-25 | End: 2024-11-25 | Stop reason: SDUPTHER

## 2024-11-25 RX ORDER — FENTANYL CITRATE 0.05 MG/ML
50 INJECTION, SOLUTION INTRAMUSCULAR; INTRAVENOUS EVERY 5 MIN PRN
Status: DISCONTINUED | OUTPATIENT
Start: 2024-11-25 | End: 2024-11-25 | Stop reason: HOSPADM

## 2024-11-25 RX ORDER — SODIUM CHLORIDE 0.9 % (FLUSH) 0.9 %
5-40 SYRINGE (ML) INJECTION EVERY 12 HOURS SCHEDULED
Status: DISCONTINUED | OUTPATIENT
Start: 2024-11-25 | End: 2024-11-25 | Stop reason: HOSPADM

## 2024-11-25 RX ORDER — SODIUM CHLORIDE 0.9 % (FLUSH) 0.9 %
5-40 SYRINGE (ML) INJECTION PRN
Status: DISCONTINUED | OUTPATIENT
Start: 2024-11-25 | End: 2024-11-25 | Stop reason: HOSPADM

## 2024-11-25 RX ORDER — LIDOCAINE HYDROCHLORIDE 20 MG/ML
INJECTION, SOLUTION EPIDURAL; INFILTRATION; INTRACAUDAL; PERINEURAL
Status: DISCONTINUED | OUTPATIENT
Start: 2024-11-25 | End: 2024-11-25 | Stop reason: SDUPTHER

## 2024-11-25 RX ORDER — GLYCOPYRROLATE 0.2 MG/ML
INJECTION INTRAMUSCULAR; INTRAVENOUS
Status: DISCONTINUED | OUTPATIENT
Start: 2024-11-25 | End: 2024-11-25 | Stop reason: SDUPTHER

## 2024-11-25 RX ORDER — ONDANSETRON 2 MG/ML
4 INJECTION INTRAMUSCULAR; INTRAVENOUS
Status: DISCONTINUED | OUTPATIENT
Start: 2024-11-25 | End: 2024-11-25 | Stop reason: HOSPADM

## 2024-11-25 RX ORDER — PREDNISONE 20 MG/1
40 TABLET ORAL DAILY
Qty: 20 TABLET | Refills: 0 | Status: SHIPPED | OUTPATIENT
Start: 2024-11-26 | End: 2024-12-06

## 2024-11-25 RX ORDER — LIDOCAINE HYDROCHLORIDE AND EPINEPHRINE 10; 10 MG/ML; UG/ML
INJECTION, SOLUTION INFILTRATION; PERINEURAL
Status: COMPLETED | OUTPATIENT
Start: 2024-11-25 | End: 2024-11-25

## 2024-11-25 RX ORDER — ACETAMINOPHEN 325 MG/1
650 TABLET ORAL
Status: DISCONTINUED | OUTPATIENT
Start: 2024-11-25 | End: 2024-11-25 | Stop reason: HOSPADM

## 2024-11-25 RX ORDER — SODIUM CHLORIDE 9 MG/ML
INJECTION, SOLUTION INTRAVENOUS PRN
Status: DISCONTINUED | OUTPATIENT
Start: 2024-11-25 | End: 2024-11-25 | Stop reason: HOSPADM

## 2024-11-25 RX ORDER — NALOXONE HYDROCHLORIDE 0.4 MG/ML
INJECTION, SOLUTION INTRAMUSCULAR; INTRAVENOUS; SUBCUTANEOUS PRN
Status: DISCONTINUED | OUTPATIENT
Start: 2024-11-25 | End: 2024-11-25 | Stop reason: HOSPADM

## 2024-11-25 RX ORDER — MAGNESIUM HYDROXIDE 1200 MG/15ML
LIQUID ORAL CONTINUOUS PRN
Status: COMPLETED | OUTPATIENT
Start: 2024-11-25 | End: 2024-11-25

## 2024-11-25 RX ORDER — PROPOFOL 10 MG/ML
INJECTION, EMULSION INTRAVENOUS
Status: DISCONTINUED | OUTPATIENT
Start: 2024-11-25 | End: 2024-11-25 | Stop reason: SDUPTHER

## 2024-11-25 RX ORDER — FENTANYL CITRATE 50 UG/ML
INJECTION, SOLUTION INTRAMUSCULAR; INTRAVENOUS
Status: DISCONTINUED | OUTPATIENT
Start: 2024-11-25 | End: 2024-11-25 | Stop reason: SDUPTHER

## 2024-11-25 RX ADMIN — LIDOCAINE HYDROCHLORIDE 100 MG: 20 INJECTION, SOLUTION EPIDURAL; INFILTRATION; INTRACAUDAL; PERINEURAL at 07:34

## 2024-11-25 RX ADMIN — ONDANSETRON 4 MG: 2 INJECTION INTRAMUSCULAR; INTRAVENOUS at 07:37

## 2024-11-25 RX ADMIN — FENTANYL CITRATE 25 MCG: 50 INJECTION INTRAMUSCULAR; INTRAVENOUS at 07:47

## 2024-11-25 RX ADMIN — PREDNISONE 40 MG: 20 TABLET ORAL at 09:56

## 2024-11-25 RX ADMIN — BUDESONIDE AND FORMOTEROL FUMARATE DIHYDRATE 2 PUFF: 160; 4.5 AEROSOL RESPIRATORY (INHALATION) at 09:24

## 2024-11-25 RX ADMIN — PROPOFOL 150 MG: 10 INJECTION, EMULSION INTRAVENOUS at 07:34

## 2024-11-25 RX ADMIN — SODIUM CHLORIDE 2000 MG: 900 INJECTION INTRAVENOUS at 07:37

## 2024-11-25 RX ADMIN — SODIUM CHLORIDE, PRESERVATIVE FREE 10 ML: 5 INJECTION INTRAVENOUS at 09:57

## 2024-11-25 RX ADMIN — GLYCOPYRROLATE 0.2 MG: 0.2 INJECTION, SOLUTION INTRAMUSCULAR; INTRAVENOUS at 07:42

## 2024-11-25 RX ADMIN — FENTANYL CITRATE 25 MCG: 50 INJECTION INTRAMUSCULAR; INTRAVENOUS at 07:37

## 2024-11-25 RX ADMIN — SODIUM CHLORIDE: 9 INJECTION, SOLUTION INTRAVENOUS at 07:28

## 2024-11-25 RX ADMIN — AMLODIPINE BESYLATE 10 MG: 10 TABLET ORAL at 09:56

## 2024-11-25 RX ADMIN — ACETAMINOPHEN 650 MG: 325 TABLET ORAL at 11:43

## 2024-11-25 RX ADMIN — DEXAMETHASONE SODIUM PHOSPHATE 8 MG: 4 INJECTION, SOLUTION INTRAMUSCULAR; INTRAVENOUS at 07:37

## 2024-11-25 ASSESSMENT — PAIN SCALES - GENERAL
PAINLEVEL_OUTOF10: 2
PAINLEVEL_OUTOF10: 0

## 2024-11-25 ASSESSMENT — PAIN DESCRIPTION - DESCRIPTORS: DESCRIPTORS: ACHING

## 2024-11-25 ASSESSMENT — PAIN DESCRIPTION - LOCATION: LOCATION: HEAD

## 2024-11-25 ASSESSMENT — ENCOUNTER SYMPTOMS: SHORTNESS OF BREATH: 0

## 2024-11-25 ASSESSMENT — PAIN - FUNCTIONAL ASSESSMENT: PAIN_FUNCTIONAL_ASSESSMENT: ACTIVITIES ARE NOT PREVENTED

## 2024-11-25 ASSESSMENT — LIFESTYLE VARIABLES: SMOKING_STATUS: 0

## 2024-11-25 ASSESSMENT — PAIN DESCRIPTION - FREQUENCY: FREQUENCY: CONTINUOUS

## 2024-11-25 ASSESSMENT — PAIN DESCRIPTION - ONSET: ONSET: GRADUAL

## 2024-11-25 ASSESSMENT — PAIN DESCRIPTION - ORIENTATION: ORIENTATION: RIGHT;LEFT;OTHER (COMMENT)

## 2024-11-25 ASSESSMENT — PAIN DESCRIPTION - PAIN TYPE: TYPE: ACUTE PAIN;SURGICAL PAIN

## 2024-11-25 NOTE — PROGRESS NOTES
Pt to pacu from OR. Pt asleep with oral airway in place, on 4L via CN per CRNA. Placed on monitor, vss. Incision sites x2 noted to bilateral temples, clean dry and intact, no bruising redness or swelling noted to sites. No signs of distress noted at this time, report obtained.

## 2024-11-25 NOTE — OP NOTE
[facility]          [address] [place] [address]                            OPERATIVE REPORT      PATIENT NAME: AZIZA DOMINGUEZ            : 1955  MED REC NO: [id number]                      ROOM: [place] [address]  ACCOUNT NO: [id number]                       ADMIT DATE: 2024  PROVIDER: Ilya Griffin MD      DATE OF PROCEDURE:  2024    SURGEON:  Ilya Griffin MD    PROCEDURE PERFORMED:  Bilateral temporal artery biopsy.    PREOPERATIVE DIAGNOSIS:  Temporal arteritis.    POSTOPERATIVE DIAGNOSIS:  Temporal arteritis.    ASSISTANT:  Roselyn Toro.    ANESTHESIA:  General LMA anesthesia.    ESTIMATED BLOOD LOSS:  Less than 25 mL.    IMPLANTS:  None.    SPECIMENS:    1. Left temporal artery biopsy.  2. Right temporal artery biopsy.    COMPLICATIONS:  None.    INTRAOPERATIVE FINDINGS:  Bilateral temporal arteries appeared normal, though removed without issue for biopsy.    INDICATIONS FOR PROCEDURE:  The patient is a 69-year-old male who presented to [facility] after being evaluated at [facility] for vision changes.  There was concern for patient having giant cell arteritis.  He was initially referred to the ER and admitted from there.  Initially had left eye vision changes.  This stabilized and he also then subsequently had right eye vision changes approximately 3 to 4 days later.  The patient has been doing well over the weekend.  He has been started on steroids, but I was consulted for temporal artery biopsy.  This was discussed with the patient, which he agreed to.    DESCRIPTION OF PROCEDURE:  The patient was identified in the preoperative holding area where consent was obtained.  The risks, benefits, and alternatives were explained.  The patient agreed to proceed and signed informed consent.  The patient was taken to the operative suite where he was placed supine on the operating room table.  General LMA anesthesia was initiated.  The patient was  prepped and draped in usual sterile fashion.  Time-out completed and patient, surgical site, and procedure were verified.  Attention was turned to the patient's left temporal region.  An incision was made over the left temporal artery after instillation of local anesthetic.  Electrocautery was used to dissect down through subcutaneous tissues and fascia.  Once the fascia was opened, temporal artery was identified, dissected proximally and distally with Metzenbaum scissors for adequate length of approximately 2 cm.  It was then clamped proximally and distally with right angle clamps and transected with a 15 blade scalpel.  This dissection was passed off as specimen.  The 2 ends that remained were ligated with 4-0 silk suture.  The wound was then irrigated with sterile saline and closed in multiple layers with 3-0 Vicryl for subcutaneous tissues and 4-0 Monocryl for skin.  I then turned my attention to the right temporal region.  Again made an incision over the temporal artery with a 15 blade after instillation of local anesthetic.  Electrocautery was used to dissect down through subcutaneous tissues and fascia.  I dissected beneath the fascia.  I encountered the temporal vein, which was slightly injured and ligated with clips causing a small amount of bleeding.  Just next to it was a temporal artery, which I dissected proximally and distally for approximately greater than 2 cm.  Again clamped proximally and distally with right angle clamps and then resected the intervening approximately 2 cm segment with a 15 blade scalpel.  The residual ends were then again ligated with 4-0 silk suture.  The wound was irrigated with sterile saline and then closed in multiple layers with 3-0 Vicryl for deep and subcutaneous tissues and 4-0 Monocryl for skin.  Skin was covered with Dermabond bilaterally.  The patient then awakened from anesthesia, extubated in the OR suite, taken to PACU in stable condition.  There were no known

## 2024-11-25 NOTE — BRIEF OP NOTE
Brief Postoperative Note      Patient: Home Thorpe II  YOB: 1955  MRN: [id number]    Date of Procedure: 11/25/2024    Pre-Op Diagnosis Codes:      * Giant cell arteritis (HCC) [M31.6]    Post-Op Diagnosis: Same       Procedure(s):  BILATERAL TEMPORAL ARTERY BIOPSY    Surgeon(s):  Ilya Griffin MD    Assistant:  Surgical Assistant: Roselyn De    Anesthesia: General    Estimated Blood Loss (mL): Minimal    Complications: None    Specimens:   ID Type Source Tests Collected by Time Destination   A : A. Left temporal artery Specimen Face SURGICAL PATHOLOGY Ilya Griffin MD 11/25/2024 0753    B : B. Right temporal artery Specimen Face SURGICAL PATHOLOGY Ilya Griffin MD 11/25/2024 0754        Implants:  * No implants in log *      Drains: * No LDAs found *    Findings:  Infection Present At Time Of Surgery (PATOS) (choose all levels that have infection present):  No infection present  Other Findings: bilateral temporal artery biopsies completed without issue    [id number]    Electronically signed by Ilya Griffin MD on 11/25/2024 at 8:34 AM  
All other review of systems negative, except as noted in HPI

## 2024-11-25 NOTE — PROGRESS NOTES
Pt sitting up in bed, chatting with writer, still denies pain or nausea at this time, vss. Pt states feels ready to move back to inpatient room for breakfast. Report called to KLEVER Magallanes. Pt taken upstairs via bed by pacu staff. No signs of distress noted at this time.

## 2024-11-25 NOTE — ANESTHESIA POSTPROCEDURE EVALUATION
Department of Anesthesiology  Postprocedure Note    Patient: Home Thorpe II  MRN: [id number]  YOB: 1955  Date of evaluation: 11/25/2024    Procedure Summary       Date: 11/25/24 Room / Location: [address]    Anesthesia Start: 0728 Anesthesia Stop: 0826    Procedure: BILATERAL TEMPORAL ARTERY BIOPSY (Bilateral: Head) Diagnosis:       Giant cell arteritis (HCC)      (Giant cell arteritis (HCC) [M31.6])    Surgeons: Ilya Griffin MD Responsible Provider: Shirin Valenzuela MD    Anesthesia Type: General ASA Status: 3            Anesthesia Type: General    Chase Phase I: Chase Score: 10    Chase Phase II:      Anesthesia Post Evaluation    Patient location during evaluation: bedside  Patient participation: complete - patient participated  Level of consciousness: awake and alert  Pain score: 3  Airway patency: patent  Nausea & Vomiting: no vomiting  Cardiovascular status: blood pressure returned to baseline  Respiratory status: acceptable  Hydration status: euvolemic  Pain management: adequate    No notable events documented.

## 2024-11-25 NOTE — PROGRESS NOTES
Reviewed discharge instructions with pt and his wife. Reviewed follow up appointments and the importance of keeping those appointments. Verbalized understanding. Reviewed medications including time of next dose, purpose of medication and possible side effects. IV removed from right AC. Pt verbalized understanding. Pt to be discharged to home.   Declines

## 2024-11-25 NOTE — PROGRESS NOTES
Latest Reference Range & Units 11/23/24 05:13   Folate 4.78 - 24.20 ng/mL 12.70   Vitamin B-12 211 - 911 pg/mL 304        Latest Reference Range & Units 11/23/24 05:13   HIV-1 Antibody Non-reactive  Non-Reactive   HIV Ag/Ab Non-reactive  Non-Reactive   HIV ANTIGEN Non-reactive  Non-Reactive   HIV-2 Ab Non-reactive  Non-Reactive      Latest Reference Range & Units 11/23/24 05:13   TSH Reflex FT4 0.27 - 4.20 uIU/mL 0.93     B12 I usually like to be at least 400-500.  I called patient and they did not answer, so I left voicemail to call my office back.  Recommend, if not on B12 supplement already, to do B12 2000 mcg daily.  Recommend to follow up with me.    Jamila Vargas MD

## 2024-11-25 NOTE — FLOWSHEET NOTE
Patient does have 2 incisions to bilateral temporal areas. Glue used to close incisions. At discharge incisions are well approximated, no drainage noted.

## 2024-11-25 NOTE — PROGRESS NOTES
Patient back from surgery. Pt is awake alert and oriented able to make needs known. Denies pain. Pt does have an incision to bilateral temples. Surgical glue in place, no drainage noted. Respirations easy even no distress. Pt able to ambulate to the bathroom with stand by assist. Pt able to void with no difficulty. Female visitor at bedside. Call light within reach. Will monitor.

## 2024-11-25 NOTE — ANESTHESIA PRE PROCEDURE
MD Ilya       • mineral oil-hydrophilic petrolatum (AQUAPHOR) ointment   Topical BID PRN Lit Kern DO       • predniSONE (DELTASONE) tablet 40 mg  40 mg Oral Daily Lit Kern DO       • atorvastatin (LIPITOR) tablet 10 mg  10 mg Oral Nightly Lit Kern DO   10 mg at 11/24/24 2048   • sodium chloride flush 0.9 % injection 5-40 mL  5-40 mL IntraVENous 2 times per day Lit Kern DO   10 mL at 11/24/24 2048   • sodium chloride flush 0.9 % injection 5-40 mL  5-40 mL IntraVENous PRN Lit Kern DO       • 0.9 % sodium chloride infusion   IntraVENous PRN Lit Kern DO       • potassium chloride (KLOR-CON M) extended release tablet 40 mEq  40 mEq Oral PRN Lit Kern DO        Or   • potassium bicarb-citric acid (EFFER-K) effervescent tablet 40 mEq  40 mEq Oral PRN Lit Kern DO        Or   • potassium chloride 10 mEq/100 mL IVPB (Peripheral Line)  10 mEq IntraVENous PRN Lit Kern DO       • magnesium sulfate 2000 mg in 50 mL IVPB premix  2,000 mg IntraVENous PRN Lit Kern DO       • [Held by provider] enoxaparin (LOVENOX) injection 40 mg  40 mg SubCUTAneous QHS Lit Kern DO   40 mg at 11/23/24 2007   • ondansetron (ZOFRAN-ODT) disintegrating tablet 4 mg  4 mg Oral Q8H PRN Lit Kern DO        Or   • ondansetron (ZOFRAN) injection 4 mg  4 mg IntraVENous Q6H PRN Lit Kern, DO       • polyethylene glycol (GLYCOLAX) packet 17 g  17 g Oral Daily PRN Lit Kern DO       • acetaminophen (TYLENOL) tablet 650 mg  650 mg Oral Q6H PRN Lit Kern DO        Or   • acetaminophen (TYLENOL) suppository 650 mg  650 mg Rectal Q6H PRN Lit Kern, DO       • amLODIPine (NORVASC) tablet 10 mg  10 mg Oral Daily Lit Kern DO   10 mg at 11/24/24 0928   • pantoprazole (PROTONIX) tablet 40 mg  40 mg Oral MADALYNM Lit Wooten DO   40 mg at 11/24/24 0551   • budesonide-formoterol (SYMBICORT) 160-4.5 MCG/ACT inhaler 2 puff  2 puff Inhalation BID

## 2024-11-25 NOTE — PLAN OF CARE
Problem: Pain  Goal: Verbalizes/displays adequate comfort level or baseline comfort level  Outcome: Progressing     Problem: Safety - Adult  Goal: Free from fall injury  Outcome: Progressing     Problem: Neurosensory - Adult  Goal: Achieves stable or improved neurological status  Outcome: Progressing  Flowsheets (Taken 11/25/2024 0043)  Achieves stable or improved neurological status: Assess for and report changes in neurological status

## 2024-11-25 NOTE — DISCHARGE SUMMARY
V2.0  Discharge Summary    Name:  Home Thorpe II /Age/Sex: 1955 (69 y.o. male)   Admit Date: 2024  Discharge Date: 24    MRN & CSN:  [id number] & [id number] Encounter Date and Time 24 3:24 PM EST    Attending:  Lit Kern DO Discharging Provider: Lit Kern DO       Hospital Course:     Brief HPI: Home Thorpe II is a 69 y.o. male with pertinent PMHx of HTN, HLD, SABINO who presented to the ED complaining of worsening bilateral vision loss.  He was reportedly recently evaluated at [facility] with concern of optic nerve edema and potential giant cell arteritis.  His ESR was elevated on admission however CRP was within normal limits.  He underwent MRI which showed decreased caliber of the right optic nerve and no evidence of acute infarct.  He was started on high-dose IV Solu-Medrol 1 g daily for 3 days which was then transitioned to oral prednisone 40 mg daily pending biopsy results.  He underwent bilateral temporal artery biopsy with vascular surgery on . He is to follow-up with ophthalmology at [facility] as soon as possible.    Neurology was consulted on admission and recommended continuing steroids.  His wife also reported concerns of progressive cognitive decline and therefore he is to follow-up with Neurology as an outpatient for neuropsych testing    Brief Problem Based Course:     Bilateral vision loss  Giant cell arteritis  -S/p 3 days of IV Solu-Medrol 1 g daily  -Prednisone 40 mg daily pending results of biopsy  -S/p bilateral temporal artery biopsy on   -Close follow-up with ophthalmology    Left posterior fossa mass  -Incidental finding on imaging, concerning for meningioma, outpatient referral to Neurosurgery    Essential hypertension  -Amlodipine    Chronic cough  -Symbicort    The patient expressed appropriate understanding of, and agreement with the discharge recommendations, medications, and plan.     Consults this admission:  IP CONSULT TO

## 2024-11-25 NOTE — PROGRESS NOTES
Progress Note  The patient is being evaluated for vision loss.     Updates    Completed IV HD steroids.   Had TA bx today.  Vision still impaired but continues to report improvement.          Active Ambulatory Problems     Diagnosis Date Noted    Hypertension 02/11/2010    Hyperlipidemia 02/11/2010    Insomnia 03/29/2012    Allergic rhinitis 06/04/2013    Scrotal hernia 07/25/2017    Recurrent inguinal hernia of left side without obstruction or gangrene 09/15/2017    SABINO (obstructive sleep apnea) 01/09/2023    Treatment-emergent central sleep apnea 01/30/2023    Giant cell arteritis (HCC) 11/22/2024     Resolved Ambulatory Problems     Diagnosis Date Noted    Bronchitis 12/13/2011    Acute sinusitis 02/27/2013    Postop check 08/22/2017     Past Medical History:   Diagnosis Date    Recurrent left inguinal hernia     S/P colonoscopy 08/01/2008    Sleep apnea        Current Facility-Administered Medications:     mineral oil-hydrophilic petrolatum (AQUAPHOR) ointment, , Topical, BID PRN, Ilya Griffin MD    predniSONE (DELTASONE) tablet 40 mg, 40 mg, Oral, Daily, Ilya Griffin MD, 40 mg at 11/25/24 0956    atorvastatin (LIPITOR) tablet 10 mg, 10 mg, Oral, Nightly, Ilya Griffin MD, 10 mg at 11/24/24 2048    sodium chloride flush 0.9 % injection 5-40 mL, 5-40 mL, IntraVENous, 2 times per day, Ilya Griffin MD, 10 mL at 11/25/24 0957    sodium chloride flush 0.9 % injection 5-40 mL, 5-40 mL, IntraVENous, PRN, Ilya Griffin MD    0.9 % sodium chloride infusion, , IntraVENous, PRN, Ilya Griffin MD    potassium chloride (KLOR-CON M) extended release tablet 40 mEq, 40 mEq, Oral, PRN **OR** potassium bicarb-citric acid (EFFER-K) effervescent tablet 40 mEq, 40 mEq, Oral, PRN **OR** potassium chloride 10 mEq/100 mL IVPB (Peripheral Line), 10 mEq, IntraVENous, PRN, Ilya Griffin MD    magnesium sulfate 2000 mg in 50 mL IVPB premix, 2,000 mg, IntraVENous, PRN, Ilya Griffin MD    [Held by provider] enoxaparin (LOVENOX)  11/22/24:  The major dural venous sinuses are patent without evidence of a thrombus.       CT Head w/o 11/22/24:   IMPRESSION:  1. No acute intracranial findings.  2. Age-related atrophy and chronic small vessel ischemic change.     CTA Head & Neck w/ 11/22/24:  IMPRESSION:  1. No flow limiting stenosis seen of the cervical carotid/vertebral arteries.  2. No significant stenosis or large vessel occlusion of the [id number].    Impression  Progressive now bilateral blurry vision. Evaluated by ophthalmology prior to admission, also with elevated ESR, agree with concerns for giant cell arteritis. As discussed previously with neurology attending MRI findings are likely chronic.     Clinically stable, vision slowly impoving    2. Abnormal bilateral optic nerves: per MRI imaging this admission  3. Elevated sed rate.   4. Left posterior fossa mass: per radiology, suspected meningioma.     Recommendations  - Continue prednisone until bx results have resulted.   - Follow up with Ophthalmology as OP.   -  NO driving until visual fields are formally cleared by ophthalmology (discussed with patient).   - Follow up with Neurosurgery as OP for left posterior fossa mass findings. (Discussed with patient and family)  - Follow up with Dr. Vargas as OP.     Neurology signing off. Please call back with any further questions or concerns.     Rosangela Hernández, CNP  [facility] Neurology    A copy of this note was provided for Lit Weems DO

## 2024-11-25 NOTE — PLAN OF CARE
Problem: Discharge Planning  Goal: Discharge to home or other facility with appropriate resources  Outcome: Progressing  Note: Prior to admission pt was living at home with his wife. Pt was also working outside the home on admission. Plan is for pt to be discharged to home. Pt is not allowed to drive until formally cleared by Ophthalmology.      Problem: Pain  Goal: Verbalizes/displays adequate comfort level or baseline comfort level  11/25/2024 1434 by Sona oCyne RN  Outcome: Progressing  Note: Pt did receive tylenol for complaints of pain to surgical sites and received relief. Pt has no difficulty expressing needs.      Problem: Chronic Conditions and Co-morbidities  Goal: Patient's chronic conditions and co-morbidity symptoms are monitored and maintained or improved  Outcome: Progressing  Flowsheets (Taken 11/25/2024 1434)  Care Plan - Patient's Chronic Conditions and Co-Morbidity Symptoms are Monitored and Maintained or Improved:   Monitor and assess patient's chronic conditions and comorbid symptoms for stability, deterioration, or improvement   Collaborate with multidisciplinary team to address chronic and comorbid conditions and prevent exacerbation or deterioration   Update acute care plan with appropriate goals if chronic or comorbid symptoms are exacerbated and prevent overall improvement and discharge     Problem: Safety - Adult  Goal: Free from fall injury  11/25/2024 1434 by Sona Coyne RN  Outcome: Progressing  Note: Fall risk assessment completed every shift. All precautions in place. Pt has call light within reach at all times. Room clear of clutter. Pt aware to call for assistance when getting up.       Problem: Neurosensory - Adult  Goal: Achieves stable or improved neurological status  11/25/2024 1434 by Sona Coyne RN  Outcome: Progressing  Note: Pt is awake alert and oriented able to make needs known. PERRLA. Pt able to move all extremities. Vital signs checked every 4 hours  and prn.      Problem: Neurosensory - Adult  Goal: Absence of seizures  Outcome: Progressing  Note: No seizure activity noted.

## 2024-11-25 NOTE — PROGRESS NOTES
Pt now awake and talking, oriented to pacu, denies pain or nausea at this time, falls back to sleep easily. On RA. vss

## 2024-11-25 NOTE — CARE COORDINATION
Discharge order acknowledged.   Met with patient and wife at bedside.   Provided IMM letter.   Patient inquired about Healthcare Living Will and Power of  paperwork as the  had not met with him yet.   Provided patient copy of [place] living will document.   No additional needs at this time.   Wife at bedside to transport.     ABBEY Ponce, LSW, Social Work/Case Management   [phone]  Electronically signed by ABBEY Ponce on 11/25/2024 at 3:45 PM

## 2024-11-26 ENCOUNTER — TELEPHONE (OUTPATIENT)
Dept: FAMILY MEDICINE CLINIC | Age: 69
End: 2024-11-26

## 2024-11-26 ENCOUNTER — CARE COORDINATION (OUTPATIENT)
Dept: CASE MANAGEMENT | Age: 69
End: 2024-11-26

## 2024-11-26 DIAGNOSIS — H54.3 VISION LOSS, BILATERAL: Primary | ICD-10-CM

## 2024-11-26 PROCEDURE — 1111F DSCHRG MED/CURRENT MED MERGE: CPT | Performed by: INTERNAL MEDICINE

## 2024-11-26 NOTE — CARE COORDINATION
Care Transitions Note    Initial Call - Call within 2 business days of discharge: Yes    Patient Current Location:  Home: [address]  [place] [facility] [place] [address]    Care Transition Nurse contacted the patient by telephone to perform post hospital discharge assessment, verified name and  as identifiers. Provided introduction to self, and explanation of the Care Transition Nurse role.     Patient: Home Thorpe II    Patient : 1955   MRN: [id number]    Reason for Admission: vision loss, bilateral  Discharge Date: 24  RURS: Readmission Risk Score: 8.1      Last Discharge Facility       Date Complaint Diagnosis Description Type Department Provider    24 Illness Vision changes ... ED to Hosp-Admission (Discharged) (ADMITTED) [facility] Lit Kern, DO; Chasidy Flowers...            Was this an external facility discharge? No    Additional needs identified to be addressed with provider   High priority: please assist with HFU scheduling. Patient d/c from [facility] on 24 dx bilateral vision loss            Method of communication with provider: chart routing.    Patients top risk factors for readmission: functional physical ability    Interventions to address risk factors:   Review of patient management of conditions/medications: see note    Care Summary Note: Patient answered call and verified . Patient pleasant and agreeable to transition call. Patient continues to be tired from recent hospitalization. Stated his vision is getting better and \"not as bad as it was\". Confirmed he had prednisone prescription and taking as directed. Medication list reviewed and noted in system. AVS reviewed and stated his wife is scheduling all of his follow up appts with PCP and specialist. CTN offered to schedule with PCP, but no time slots were available within the 7 day timeframe. Message routed to provider's office.   Denied any acute needs at present time.  Agreeable to f/u calls.  Educated on the use of

## 2024-11-26 NOTE — TELEPHONE ENCOUNTER
Care Transitions Initial Follow Up Call    Outreach made within 2 business days of discharge: Yes    Patient: Home Thorpe II Patient : 1955   MRN: [id number]  Reason for Admission: Vision loss   Discharge Date: 24       Spoke with: Patient      Discharge department/facility: 2024 [facility] Patient Contact:  Was patient able to fill all prescriptions:   Was patient instructed to bring all medications to the follow-up visit:   Is patient taking all medications as directed in the discharge summary?   Does patient understand their discharge instructions:   Does patient have questions or concerns that need addressed prior to 7-14 day follow up office visit:     Additional needs identified to be addressed with provider  Patient Stated wife needs to call and schedule stated she was not home and will call back.              Scheduled appointment with PCP within 7-14 days    Follow Up  Future Appointments   Date Time Provider Department Center   3/11/2025  3:30 PM Mariya Daily MD [facility]   2025  2:20 PM Iraida Rodriguez MD [facility]       Mickie Perez MA

## 2024-12-03 ENCOUNTER — CARE COORDINATION (OUTPATIENT)
Dept: CASE MANAGEMENT | Age: 69
End: 2024-12-03

## 2024-12-03 NOTE — CARE COORDINATION
[phone]            Care Transition Nurse provided contact information.  Plan for follow-up call in 6-10 days based on severity of symptoms and risk factors.  Plan for next call: self management-       Mitali Melton RN

## 2024-12-10 ENCOUNTER — CARE COORDINATION (OUTPATIENT)
Dept: CASE MANAGEMENT | Age: 69
End: 2024-12-10

## 2024-12-10 NOTE — CARE COORDINATION
Care Transitions Note    Follow Up Call     Attempted to reach patient for transitions of care follow up.  Unable to reach patient.      Outreach Attempts:   HIPAA compliant voicemail left for patient.     Follow Up Appointment:   Future Appointments         Provider Specialty Dept Phone    12/13/2024 11:45 AM Ilya Griffin MD Vascular Surgery [phone]    3/11/2025 3:30 PM Mariya Daily MD Family Medicine [phone]    4/28/2025 2:20 PM Iraida Rodriguez MD Sleep Medicine [phone]            Mitali Melton RN

## 2024-12-13 ENCOUNTER — OFFICE VISIT (OUTPATIENT)
Dept: VASCULAR SURGERY | Age: 69
End: 2024-12-13
Payer: MEDICARE

## 2024-12-13 VITALS
BODY MASS INDEX: 35.82 KG/M2 | WEIGHT: 215 LBS | HEIGHT: 65 IN | SYSTOLIC BLOOD PRESSURE: 128 MMHG | DIASTOLIC BLOOD PRESSURE: 74 MMHG

## 2024-12-13 DIAGNOSIS — M31.6 GIANT CELL ARTERITIS (HCC): Primary | ICD-10-CM

## 2024-12-13 PROCEDURE — 3078F DIAST BP <80 MM HG: CPT | Performed by: SURGERY

## 2024-12-13 PROCEDURE — 1123F ACP DISCUSS/DSCN MKR DOCD: CPT | Performed by: SURGERY

## 2024-12-13 PROCEDURE — 99213 OFFICE O/P EST LOW 20 MIN: CPT | Performed by: SURGERY

## 2024-12-13 PROCEDURE — 3074F SYST BP LT 130 MM HG: CPT | Performed by: SURGERY

## 2024-12-13 PROCEDURE — 1159F MED LIST DOCD IN RCRD: CPT | Performed by: SURGERY

## 2024-12-13 NOTE — PROGRESS NOTES
lungs 2 times daily      fluticasone (FLONASE) 50 MCG/ACT nasal spray 2 sprays by Nasal route daily as needed for Rhinitis      loratadine (CLARITIN) 10 MG tablet Take 1 tablet by mouth daily      atorvastatin (LIPITOR) 10 MG tablet TAKE 1 TABLET BY MOUTH EVERY DAY 90 tablet 3    amLODIPine (NORVASC) 10 MG tablet TAKE 1 TABLET BY MOUTH EVERY DAY FOR HIGH BLOOD PRESSURE 90 tablet 3     No current facility-administered medications for this visit.       Allergies  Allergies   Allergen Reactions    Azithromycin Other (See Comments) and Nausea Only     Closes up sinuses  Other reaction(s): Other (See Comments)  Closes up sinuses  Closes up sinuses      Guaifenesin Er Other (See Comments)     \"made my cough worse and fluid built up in my lungs\"    Erythromycin Nausea And Vomiting    Vicodin [Hydrocodone-Acetaminophen] Nausea And Vomiting       Social History  Social History     Socioeconomic History    Marital status:      Spouse name: None    Number of children: None    Years of education: None    Highest education level: None   Tobacco Use    Smoking status: Former     Types: Cigars     Start date:      Quit date:      Years since quittin.9     Passive exposure: Past    Smokeless tobacco: Never   Vaping Use    Vaping status: Never Used   Substance and Sexual Activity    Alcohol use: No    Drug use: No     Social Determinants of Health     Financial Resource Strain: Low Risk  (2024)    Overall Financial Resource Strain (CARDIA)     Difficulty of Paying Living Expenses: Not hard at all   Food Insecurity: No Food Insecurity (2024)    Hunger Vital Sign     Worried About Running Out of Food in the Last Year: Never true     Ran Out of Food in the Last Year: Never true   Transportation Needs: No Transportation Needs (2024)    PRAPARE - Transportation     Lack of Transportation (Medical): No     Lack of Transportation (Non-Medical): No   Physical Activity: Inactive (2024)    Exercise

## 2024-12-17 ENCOUNTER — CARE COORDINATION (OUTPATIENT)
Dept: CASE MANAGEMENT | Age: 69
End: 2024-12-17

## 2024-12-17 NOTE — CARE COORDINATION
Care Transitions Note    Final Call     Patient Current Location:  Home: [address]  [facility] [address]    Care Transition Nurse contacted the patient by telephone. Verified name and  as identifiers.    Patient graduated from the Care Transitions program.  Patient/family verbalizes confidence in the ability to self-manage at this time. has the ability to self manage at this time..      Advance Care Planning:   Does patient have an Advance Directive: reviewed during previous call, see note. .    Handoff:   Patient was not referred to the ACM team due to no additional needs identified.       Care Summary Note: Patient answered call and verified . Patient pleasant and agreeable to final transition call. Patient stated that vision loss is about the same, but \"doesn't slow me down\". Scheduled 24 for neurology follow up care. Continues to take medication as directed. Patient seen by vascular and endovascular surgeons last week and visit went well. Denied any acute needs at present time.   Educated on the use of urgent care or physician’s 24 hr access line if assistance is needed after hours.        Assessments:  Care Transitions Subsequent and Final Call    Subsequent and Final Calls  Do you have any ongoing symptoms?: No  Have your medications changed?: No  Do you have any questions related to your medications?: No  Do you currently have any active services?: No  Do you have any needs or concerns that I can assist you with?: No  Identified Barriers: None  Care Transitions Interventions  Other Interventions:              Upcoming Appointments:    Future Appointments         Provider Specialty Dept Phone    3/11/2025 3:30 PM Mariya Daily MD Family Medicine [phone]    2025 2:20 PM Iraida Rodriguez MD Sleep Medicine [phone]            Patient has agreed to contact primary care provider and/or specialist for any further questions, concerns, or needs.    Mitali Melton RN

## 2025-01-01 ENCOUNTER — HOSPITAL ENCOUNTER (EMERGENCY)
Age: 70
End: 2025-02-27
Attending: STUDENT IN AN ORGANIZED HEALTH CARE EDUCATION/TRAINING PROGRAM
Payer: MEDICARE

## 2025-01-01 ENCOUNTER — TELEPHONE (OUTPATIENT)
Dept: FAMILY MEDICINE CLINIC | Age: 70
End: 2025-01-01

## 2025-01-01 DIAGNOSIS — E87.70 HYPERVOLEMIA, UNSPECIFIED HYPERVOLEMIA TYPE: ICD-10-CM

## 2025-01-01 DIAGNOSIS — I46.9 CARDIAC ARREST (HCC): Primary | ICD-10-CM

## 2025-01-01 LAB
EKG DIAGNOSIS: NORMAL
EKG Q-T INTERVAL: 394 MS
EKG QRS DURATION: 136 MS
EKG QTC CALCULATION (BAZETT): 403 MS
EKG R AXIS: -85 DEGREES
EKG T AXIS: 73 DEGREES
EKG VENTRICULAR RATE: 63 BPM
GLUCOSE BLD-MCNC: 279 MG/DL (ref 70–99)
PERFORMED ON: ABNORMAL

## 2025-01-01 PROCEDURE — 99283 EMERGENCY DEPT VISIT LOW MDM: CPT

## 2025-01-01 PROCEDURE — 93005 ELECTROCARDIOGRAM TRACING: CPT | Performed by: STUDENT IN AN ORGANIZED HEALTH CARE EDUCATION/TRAINING PROGRAM

## 2025-01-01 PROCEDURE — 93010 ELECTROCARDIOGRAM REPORT: CPT | Performed by: INTERNAL MEDICINE

## 2025-01-01 PROCEDURE — 31500 INSERT EMERGENCY AIRWAY: CPT

## 2025-01-01 PROCEDURE — 92950 HEART/LUNG RESUSCITATION CPR: CPT

## 2025-02-06 ENCOUNTER — HOSPITAL ENCOUNTER (INPATIENT)
Age: 70
LOS: 6 days | Discharge: HOME HEALTH | DRG: 948 | End: 2025-02-12
Attending: STUDENT IN AN ORGANIZED HEALTH CARE EDUCATION/TRAINING PROGRAM
Payer: MEDICARE

## 2025-02-06 ENCOUNTER — TELEPHONE (OUTPATIENT)
Dept: FAMILY MEDICINE CLINIC | Age: 70
End: 2025-02-06

## 2025-02-06 ENCOUNTER — APPOINTMENT (OUTPATIENT)
Dept: GENERAL RADIOLOGY | Age: 70
DRG: 948 | End: 2025-02-06
Payer: MEDICARE

## 2025-02-06 DIAGNOSIS — R94.31 ABNORMAL EKG: ICD-10-CM

## 2025-02-06 DIAGNOSIS — I50.9 ACUTE CONGESTIVE HEART FAILURE, UNSPECIFIED HEART FAILURE TYPE (HCC): Primary | ICD-10-CM

## 2025-02-06 DIAGNOSIS — Z71.89 GOALS OF CARE, COUNSELING/DISCUSSION: ICD-10-CM

## 2025-02-06 DIAGNOSIS — R06.02 SHORTNESS OF BREATH: ICD-10-CM

## 2025-02-06 LAB
ALBUMIN SERPL-MCNC: 3.6 G/DL (ref 3.4–5)
ALBUMIN/GLOB SERPL: 1.2 {RATIO} (ref 1.1–2.2)
ALP SERPL-CCNC: 89 U/L (ref 40–129)
ALT SERPL-CCNC: 54 U/L (ref 10–40)
ANION GAP SERPL CALCULATED.3IONS-SCNC: 9 MMOL/L (ref 3–16)
ANISOCYTOSIS BLD QL SMEAR: ABNORMAL
APTT BLD: 24.9 SEC (ref 22.1–36.4)
AST SERPL-CCNC: 24 U/L (ref 15–37)
BASOPHILS # BLD: 0 K/UL (ref 0–0.2)
BASOPHILS NFR BLD: 0 %
BILIRUB SERPL-MCNC: <0.2 MG/DL (ref 0–1)
BUN SERPL-MCNC: 18 MG/DL (ref 7–20)
CALCIUM SERPL-MCNC: 9 MG/DL (ref 8.3–10.6)
CHLORIDE SERPL-SCNC: 104 MMOL/L (ref 99–110)
CO2 SERPL-SCNC: 26 MMOL/L (ref 21–32)
CREAT SERPL-MCNC: 0.9 MG/DL (ref 0.8–1.3)
DEPRECATED RDW RBC AUTO: 20.9 % (ref 12.4–15.4)
EOSINOPHIL # BLD: 0 K/UL (ref 0–0.6)
EOSINOPHIL NFR BLD: 0 %
GFR SERPLBLD CREATININE-BSD FMLA CKD-EPI: >90 ML/MIN/{1.73_M2}
GLUCOSE SERPL-MCNC: 129 MG/DL (ref 70–99)
HCT VFR BLD AUTO: 32.6 % (ref 40.5–52.5)
HGB BLD-MCNC: 9.8 G/DL (ref 13.5–17.5)
HYPOCHROMIA BLD QL SMEAR: ABNORMAL
LYMPHOCYTES # BLD: 1.1 K/UL (ref 1–5.1)
LYMPHOCYTES NFR BLD: 8 %
MACROCYTES BLD QL SMEAR: ABNORMAL
MCH RBC QN AUTO: 20.8 PG (ref 26–34)
MCHC RBC AUTO-ENTMCNC: 30.1 G/DL (ref 31–36)
MCV RBC AUTO: 68.9 FL (ref 80–100)
MICROCYTES BLD QL SMEAR: ABNORMAL
MONOCYTES # BLD: 0.4 K/UL (ref 0–1.3)
MONOCYTES NFR BLD: 3 %
NEUTROPHILS # BLD: 12.6 K/UL (ref 1.7–7.7)
NEUTROPHILS NFR BLD: 89 %
NT-PROBNP SERPL-MCNC: 331 PG/ML (ref 0–124)
OVALOCYTES BLD QL SMEAR: ABNORMAL
PATH INTERP BLD-IMP: YES
PLATELET # BLD AUTO: 323 K/UL (ref 135–450)
PMV BLD AUTO: 7.8 FL (ref 5–10.5)
POLYCHROMASIA BLD QL SMEAR: ABNORMAL
POTASSIUM SERPL-SCNC: 4.3 MMOL/L (ref 3.5–5.1)
PROT SERPL-MCNC: 6.6 G/DL (ref 6.4–8.2)
RBC # BLD AUTO: 4.73 M/UL (ref 4.2–5.9)
SARS-COV-2 RDRP RESP QL NAA+PROBE: NOT DETECTED
SODIUM SERPL-SCNC: 139 MMOL/L (ref 136–145)
TROPONIN, HIGH SENSITIVITY: 19 NG/L (ref 0–22)
TROPONIN, HIGH SENSITIVITY: 19 NG/L (ref 0–22)
WBC # BLD AUTO: 14.2 K/UL (ref 4–11)

## 2025-02-06 PROCEDURE — 99291 CRITICAL CARE FIRST HOUR: CPT

## 2025-02-06 PROCEDURE — 36415 COLL VENOUS BLD VENIPUNCTURE: CPT

## 2025-02-06 PROCEDURE — 80053 COMPREHEN METABOLIC PANEL: CPT

## 2025-02-06 PROCEDURE — 6360000002 HC RX W HCPCS: Performed by: NURSE PRACTITIONER

## 2025-02-06 PROCEDURE — 6370000000 HC RX 637 (ALT 250 FOR IP): Performed by: STUDENT IN AN ORGANIZED HEALTH CARE EDUCATION/TRAINING PROGRAM

## 2025-02-06 PROCEDURE — 83880 ASSAY OF NATRIURETIC PEPTIDE: CPT

## 2025-02-06 PROCEDURE — 96374 THER/PROPH/DIAG INJ IV PUSH: CPT

## 2025-02-06 PROCEDURE — 85730 THROMBOPLASTIN TIME PARTIAL: CPT

## 2025-02-06 PROCEDURE — 1200000000 HC SEMI PRIVATE

## 2025-02-06 PROCEDURE — 84484 ASSAY OF TROPONIN QUANT: CPT

## 2025-02-06 PROCEDURE — 87635 SARS-COV-2 COVID-19 AMP PRB: CPT

## 2025-02-06 PROCEDURE — 6360000002 HC RX W HCPCS: Performed by: STUDENT IN AN ORGANIZED HEALTH CARE EDUCATION/TRAINING PROGRAM

## 2025-02-06 PROCEDURE — 96375 TX/PRO/DX INJ NEW DRUG ADDON: CPT

## 2025-02-06 PROCEDURE — 93005 ELECTROCARDIOGRAM TRACING: CPT | Performed by: NURSE PRACTITIONER

## 2025-02-06 PROCEDURE — 85025 COMPLETE CBC W/AUTO DIFF WBC: CPT

## 2025-02-06 PROCEDURE — 71046 X-RAY EXAM CHEST 2 VIEWS: CPT

## 2025-02-06 RX ORDER — ONDANSETRON 4 MG/1
4 TABLET, ORALLY DISINTEGRATING ORAL EVERY 8 HOURS PRN
Status: DISCONTINUED | OUTPATIENT
Start: 2025-02-06 | End: 2025-02-12 | Stop reason: HOSPADM

## 2025-02-06 RX ORDER — SODIUM CHLORIDE 0.9 % (FLUSH) 0.9 %
5-40 SYRINGE (ML) INJECTION EVERY 12 HOURS SCHEDULED
Status: DISCONTINUED | OUTPATIENT
Start: 2025-02-06 | End: 2025-02-12 | Stop reason: HOSPADM

## 2025-02-06 RX ORDER — POLYETHYLENE GLYCOL 3350 17 G/17G
17 POWDER, FOR SOLUTION ORAL DAILY PRN
Status: DISCONTINUED | OUTPATIENT
Start: 2025-02-06 | End: 2025-02-12 | Stop reason: HOSPADM

## 2025-02-06 RX ORDER — ONDANSETRON 2 MG/ML
4 INJECTION INTRAMUSCULAR; INTRAVENOUS EVERY 6 HOURS PRN
Status: DISCONTINUED | OUTPATIENT
Start: 2025-02-06 | End: 2025-02-12 | Stop reason: HOSPADM

## 2025-02-06 RX ORDER — POTASSIUM CHLORIDE 1500 MG/1
40 TABLET, EXTENDED RELEASE ORAL PRN
Status: DISCONTINUED | OUTPATIENT
Start: 2025-02-06 | End: 2025-02-12 | Stop reason: HOSPADM

## 2025-02-06 RX ORDER — HEPARIN SODIUM 1000 [USP'U]/ML
4000 INJECTION, SOLUTION INTRAVENOUS; SUBCUTANEOUS PRN
Status: DISCONTINUED | OUTPATIENT
Start: 2025-02-06 | End: 2025-02-06 | Stop reason: SDUPTHER

## 2025-02-06 RX ORDER — ACETAMINOPHEN 650 MG/1
650 SUPPOSITORY RECTAL EVERY 6 HOURS PRN
Status: DISCONTINUED | OUTPATIENT
Start: 2025-02-06 | End: 2025-02-12 | Stop reason: HOSPADM

## 2025-02-06 RX ORDER — BUDESONIDE AND FORMOTEROL FUMARATE DIHYDRATE 160; 4.5 UG/1; UG/1
2 AEROSOL RESPIRATORY (INHALATION)
Status: DISCONTINUED | OUTPATIENT
Start: 2025-02-06 | End: 2025-02-12 | Stop reason: HOSPADM

## 2025-02-06 RX ORDER — SODIUM CHLORIDE 9 MG/ML
INJECTION, SOLUTION INTRAVENOUS PRN
Status: DISCONTINUED | OUTPATIENT
Start: 2025-02-06 | End: 2025-02-12 | Stop reason: HOSPADM

## 2025-02-06 RX ORDER — HEPARIN SODIUM 1000 [USP'U]/ML
4000 INJECTION, SOLUTION INTRAVENOUS; SUBCUTANEOUS ONCE
Status: COMPLETED | OUTPATIENT
Start: 2025-02-06 | End: 2025-02-06

## 2025-02-06 RX ORDER — CETIRIZINE HYDROCHLORIDE 10 MG/1
10 TABLET ORAL DAILY
Status: DISCONTINUED | OUTPATIENT
Start: 2025-02-07 | End: 2025-02-08

## 2025-02-06 RX ORDER — ACETAMINOPHEN 325 MG/1
650 TABLET ORAL EVERY 6 HOURS PRN
Status: DISCONTINUED | OUTPATIENT
Start: 2025-02-06 | End: 2025-02-12 | Stop reason: HOSPADM

## 2025-02-06 RX ORDER — ALBUTEROL SULFATE 90 UG/1
2 INHALANT RESPIRATORY (INHALATION)
Status: DISCONTINUED | OUTPATIENT
Start: 2025-02-06 | End: 2025-02-12 | Stop reason: HOSPADM

## 2025-02-06 RX ORDER — HEPARIN SODIUM 1000 [USP'U]/ML
2000 INJECTION, SOLUTION INTRAVENOUS; SUBCUTANEOUS PRN
Status: DISCONTINUED | OUTPATIENT
Start: 2025-02-06 | End: 2025-02-06 | Stop reason: SDUPTHER

## 2025-02-06 RX ORDER — POTASSIUM CHLORIDE 7.45 MG/ML
10 INJECTION INTRAVENOUS PRN
Status: DISCONTINUED | OUTPATIENT
Start: 2025-02-06 | End: 2025-02-12 | Stop reason: HOSPADM

## 2025-02-06 RX ORDER — LISINOPRIL 5 MG/1
5 TABLET ORAL DAILY
Status: DISCONTINUED | OUTPATIENT
Start: 2025-02-07 | End: 2025-02-12 | Stop reason: HOSPADM

## 2025-02-06 RX ORDER — FUROSEMIDE 10 MG/ML
40 INJECTION INTRAMUSCULAR; INTRAVENOUS ONCE
Status: COMPLETED | OUTPATIENT
Start: 2025-02-06 | End: 2025-02-06

## 2025-02-06 RX ORDER — MECOBALAMIN 1000 MCG
1 TABLET,CHEWABLE ORAL DAILY
COMMUNITY

## 2025-02-06 RX ORDER — ASPIRIN 81 MG/1
81 TABLET ORAL DAILY
Status: DISCONTINUED | OUTPATIENT
Start: 2025-02-07 | End: 2025-02-12 | Stop reason: HOSPADM

## 2025-02-06 RX ORDER — SODIUM CHLORIDE 0.9 % (FLUSH) 0.9 %
5-40 SYRINGE (ML) INJECTION PRN
Status: DISCONTINUED | OUTPATIENT
Start: 2025-02-06 | End: 2025-02-12 | Stop reason: HOSPADM

## 2025-02-06 RX ORDER — ASPIRIN 81 MG/1
81 TABLET ORAL DAILY
COMMUNITY

## 2025-02-06 RX ORDER — FUROSEMIDE 10 MG/ML
40 INJECTION INTRAMUSCULAR; INTRAVENOUS 2 TIMES DAILY
Status: DISCONTINUED | OUTPATIENT
Start: 2025-02-07 | End: 2025-02-12 | Stop reason: HOSPADM

## 2025-02-06 RX ORDER — AMLODIPINE BESYLATE 10 MG/1
10 TABLET ORAL DAILY
Status: DISCONTINUED | OUTPATIENT
Start: 2025-02-07 | End: 2025-02-12 | Stop reason: HOSPADM

## 2025-02-06 RX ORDER — ASPIRIN 81 MG/1
324 TABLET, CHEWABLE ORAL ONCE
Status: COMPLETED | OUTPATIENT
Start: 2025-02-06 | End: 2025-02-06

## 2025-02-06 RX ORDER — MAGNESIUM SULFATE IN WATER 40 MG/ML
2000 INJECTION, SOLUTION INTRAVENOUS PRN
Status: DISCONTINUED | OUTPATIENT
Start: 2025-02-06 | End: 2025-02-12 | Stop reason: HOSPADM

## 2025-02-06 RX ORDER — ATORVASTATIN CALCIUM 10 MG/1
10 TABLET, FILM COATED ORAL DAILY
Status: DISCONTINUED | OUTPATIENT
Start: 2025-02-07 | End: 2025-02-12 | Stop reason: HOSPADM

## 2025-02-06 RX ORDER — HEPARIN SODIUM 10000 [USP'U]/100ML
0-4000 INJECTION, SOLUTION INTRAVENOUS CONTINUOUS
Status: DISCONTINUED | OUTPATIENT
Start: 2025-02-06 | End: 2025-02-07

## 2025-02-06 RX ORDER — PANTOPRAZOLE SODIUM 40 MG/1
40 TABLET, DELAYED RELEASE ORAL
Status: DISCONTINUED | OUTPATIENT
Start: 2025-02-07 | End: 2025-02-12 | Stop reason: HOSPADM

## 2025-02-06 RX ORDER — PREDNISONE 20 MG/1
60 TABLET ORAL DAILY
COMMUNITY

## 2025-02-06 RX ADMIN — HEPARIN SODIUM 4000 UNITS: 1000 INJECTION INTRAVENOUS; SUBCUTANEOUS at 19:27

## 2025-02-06 RX ADMIN — FUROSEMIDE 40 MG: 10 INJECTION, SOLUTION INTRAMUSCULAR; INTRAVENOUS at 19:33

## 2025-02-06 RX ADMIN — HEPARIN SODIUM 1000 UNITS/HR: 10000 INJECTION, SOLUTION INTRAVENOUS at 19:33

## 2025-02-06 RX ADMIN — ASPIRIN 324 MG: 81 TABLET, CHEWABLE ORAL at 19:26

## 2025-02-06 ASSESSMENT — PAIN SCALES - GENERAL: PAINLEVEL_OUTOF10: 0

## 2025-02-06 NOTE — TELEPHONE ENCOUNTER
Pt's wife called in pt is having sever swelling legs, feet, abdomin, negative for chest pain, I asked pt's wife to take his blood pressure and it was 220/110 pulse was 111 pt had high glucose on labs 175 on 2/1/25 after speaking with a provider she agreed he should go to the ER for evaluation. They will be going to [facility].

## 2025-02-07 ENCOUNTER — APPOINTMENT (OUTPATIENT)
Age: 70
DRG: 948 | End: 2025-02-07
Payer: MEDICARE

## 2025-02-07 PROBLEM — R60.0 LOCALIZED EDEMA: Status: ACTIVE | Noted: 2025-02-07

## 2025-02-07 PROBLEM — I50.9 ACUTE CONGESTIVE HEART FAILURE (HCC): Status: ACTIVE | Noted: 2025-02-07

## 2025-02-07 PROBLEM — R94.31 ABNORMAL ECG: Status: ACTIVE | Noted: 2025-02-07

## 2025-02-07 LAB
ALBUMIN SERPL-MCNC: 3.2 G/DL (ref 3.4–5)
ALP SERPL-CCNC: 78 U/L (ref 40–129)
ALT SERPL-CCNC: 46 U/L (ref 10–40)
ANION GAP SERPL CALCULATED.3IONS-SCNC: 10 MMOL/L (ref 3–16)
ANTI-XA UNFRAC HEPARIN: 0.18 IU/ML (ref 0.3–0.7)
ANTI-XA UNFRAC HEPARIN: 0.2 IU/ML (ref 0.3–0.7)
ANTI-XA UNFRAC HEPARIN: 0.37 IU/ML (ref 0.3–0.7)
AST SERPL-CCNC: 27 U/L (ref 15–37)
BILIRUB DIRECT SERPL-MCNC: <0.1 MG/DL (ref 0–0.3)
BILIRUB INDIRECT SERPL-MCNC: 0.2 MG/DL (ref 0–1)
BILIRUB SERPL-MCNC: 0.3 MG/DL (ref 0–1)
BUN SERPL-MCNC: 15 MG/DL (ref 7–20)
CALCIUM SERPL-MCNC: 8.6 MG/DL (ref 8.3–10.6)
CHLORIDE SERPL-SCNC: 102 MMOL/L (ref 99–110)
CHOLEST SERPL-MCNC: 162 MG/DL (ref 0–199)
CO2 SERPL-SCNC: 30 MMOL/L (ref 21–32)
CREAT SERPL-MCNC: 0.8 MG/DL (ref 0.8–1.3)
CRP SERPL-MCNC: 19.4 MG/L (ref 0–5.1)
ECHO AO ROOT DIAM: 2.8 CM
ECHO AO ROOT INDEX: 1.36 CM/M2
ECHO AV AREA PEAK VELOCITY: 2.2 CM2
ECHO AV AREA VTI: 2.3 CM2
ECHO AV AREA/BSA PEAK VELOCITY: 1.1 CM2/M2
ECHO AV AREA/BSA VTI: 1.1 CM2/M2
ECHO AV CUSP MM: 2.1 CM
ECHO AV MEAN GRADIENT: 7 MMHG
ECHO AV MEAN VELOCITY: 1.2 M/S
ECHO AV PEAK GRADIENT: 12 MMHG
ECHO AV PEAK VELOCITY: 1.7 M/S
ECHO AV VELOCITY RATIO: 0.82
ECHO AV VTI: 27.4 CM
ECHO BSA: 2.14 M2
ECHO EST RA PRESSURE: 3 MMHG
ECHO LA AREA 2C: 11.9 CM2
ECHO LA AREA 4C: 18 CM2
ECHO LA DIAMETER INDEX: 1.02 CM/M2
ECHO LA DIAMETER: 2.1 CM
ECHO LA MAJOR AXIS: 6.7 CM
ECHO LA MINOR AXIS: 5 CM
ECHO LA TO AORTIC ROOT RATIO: 0.75
ECHO LA VOL MOD A2C: 23 ML (ref 18–58)
ECHO LA VOL MOD A4C: 39 ML (ref 18–58)
ECHO LA VOLUME INDEX MOD A2C: 11 ML/M2 (ref 16–34)
ECHO LA VOLUME INDEX MOD A4C: 19 ML/M2 (ref 16–34)
ECHO LV E' LATERAL VELOCITY: 6.96 CM/S
ECHO LV E' SEPTAL VELOCITY: 6.31 CM/S
ECHO LV EDV A4C: 69 ML
ECHO LV EDV INDEX A4C: 33 ML/M2
ECHO LV EF PHYSICIAN: 55 %
ECHO LV EJECTION FRACTION A4C: 54 %
ECHO LV ESV A4C: 32 ML
ECHO LV ESV INDEX A4C: 16 ML/M2
ECHO LV FRACTIONAL SHORTENING: 28 % (ref 28–44)
ECHO LV INTERNAL DIMENSION DIASTOLE INDEX: 1.41 CM/M2
ECHO LV INTERNAL DIMENSION DIASTOLIC: 2.9 CM (ref 4.2–5.9)
ECHO LV INTERNAL DIMENSION SYSTOLIC INDEX: 1.02 CM/M2
ECHO LV INTERNAL DIMENSION SYSTOLIC: 2.1 CM
ECHO LV IVSD: 1.3 CM (ref 0.6–1)
ECHO LV MASS 2D: 118.7 G (ref 88–224)
ECHO LV MASS INDEX 2D: 57.6 G/M2 (ref 49–115)
ECHO LV POSTERIOR WALL DIASTOLIC: 1.3 CM (ref 0.6–1)
ECHO LV RELATIVE WALL THICKNESS RATIO: 0.9
ECHO LVOT AREA: 2.8 CM2
ECHO LVOT AV VTI INDEX: 0.84
ECHO LVOT DIAM: 1.9 CM
ECHO LVOT MEAN GRADIENT: 4 MMHG
ECHO LVOT PEAK GRADIENT: 8 MMHG
ECHO LVOT PEAK VELOCITY: 1.4 M/S
ECHO LVOT STROKE VOLUME INDEX: 31.8 ML/M2
ECHO LVOT SV: 65.5 ML
ECHO LVOT VTI: 23.1 CM
ECHO MV A VELOCITY: 1.09 M/S
ECHO MV AREA VTI: 2.3 CM2
ECHO MV E DECELERATION TIME (DT): 166 MS
ECHO MV E VELOCITY: 0.75 M/S
ECHO MV E/A RATIO: 0.69
ECHO MV E/E' LATERAL: 10.78
ECHO MV E/E' RATIO (AVERAGED): 11.33
ECHO MV E/E' SEPTAL: 11.89
ECHO MV LVOT VTI INDEX: 1.21
ECHO MV MAX VELOCITY: 1.5 M/S
ECHO MV MEAN GRADIENT: 4 MMHG
ECHO MV MEAN VELOCITY: 0.9 M/S
ECHO MV PEAK GRADIENT: 9 MMHG
ECHO MV VTI: 28 CM
ECHO PV ACCELERATION TIME (AT): 69 MS
ECHO PV MAX VELOCITY: 1.8 M/S
ECHO PV PEAK GRADIENT: 13 MMHG
ECHO RA AREA 4C: 9.1 CM2
ECHO RA END SYSTOLIC VOLUME APICAL 4 CHAMBER INDEX BSA: 6 ML/M2
ECHO RA VOLUME: 12 ML
ECHO RIGHT VENTRICULAR SYSTOLIC PRESSURE (RVSP): 21 MMHG
ECHO RV FREE WALL PEAK S': 21.6 CM/S
ECHO RV INTERNAL DIMENSION: 3.2 CM
ECHO RV TAPSE: 2.3 CM (ref 1.7–?)
ECHO TV E WAVE: 0.5 M/S
ECHO TV PEAK GRADIENT: 2 MMHG
ECHO TV REGURGITANT MAX VELOCITY: 2.15 M/S
ECHO TV REGURGITANT PEAK GRADIENT: 18 MMHG
EKG ATRIAL RATE: 87 BPM
EKG ATRIAL RATE: 88 BPM
EKG DIAGNOSIS: NORMAL
EKG DIAGNOSIS: NORMAL
EKG P AXIS: 22 DEGREES
EKG P AXIS: 23 DEGREES
EKG P-R INTERVAL: 128 MS
EKG P-R INTERVAL: 132 MS
EKG Q-T INTERVAL: 344 MS
EKG Q-T INTERVAL: 358 MS
EKG QRS DURATION: 86 MS
EKG QRS DURATION: 88 MS
EKG QTC CALCULATION (BAZETT): 413 MS
EKG QTC CALCULATION (BAZETT): 433 MS
EKG R AXIS: -20 DEGREES
EKG R AXIS: -37 DEGREES
EKG T AXIS: -11 DEGREES
EKG T AXIS: 34 DEGREES
EKG VENTRICULAR RATE: 87 BPM
EKG VENTRICULAR RATE: 88 BPM
FERRITIN SERPL IA-MCNC: 40.7 NG/ML (ref 30–400)
GFR SERPLBLD CREATININE-BSD FMLA CKD-EPI: >90 ML/MIN/{1.73_M2}
GLUCOSE BLD-MCNC: 100 MG/DL (ref 70–99)
GLUCOSE BLD-MCNC: 150 MG/DL (ref 70–99)
GLUCOSE BLD-MCNC: 301 MG/DL (ref 70–99)
GLUCOSE SERPL-MCNC: 114 MG/DL (ref 70–99)
HDLC SERPL-MCNC: 62 MG/DL (ref 40–60)
IRON SATN MFR SERPL: 7 % (ref 20–50)
IRON SERPL-MCNC: 19 UG/DL (ref 59–158)
LDLC SERPL CALC-MCNC: 69 MG/DL
MAGNESIUM SERPL-MCNC: 2.29 MG/DL (ref 1.8–2.4)
PATH INTERP BLD-IMP: NORMAL
PERFORMED ON: ABNORMAL
POTASSIUM SERPL-SCNC: 3.5 MMOL/L (ref 3.5–5.1)
PROT SERPL-MCNC: 6.1 G/DL (ref 6.4–8.2)
SODIUM SERPL-SCNC: 142 MMOL/L (ref 136–145)
TIBC SERPL-MCNC: 271 UG/DL (ref 260–445)
TRIGL SERPL-MCNC: 157 MG/DL (ref 0–150)
TSH SERPL DL<=0.005 MIU/L-ACNC: 2.41 UIU/ML (ref 0.27–4.2)
VLDLC SERPL CALC-MCNC: 31 MG/DL

## 2025-02-07 PROCEDURE — 86140 C-REACTIVE PROTEIN: CPT

## 2025-02-07 PROCEDURE — 80048 BASIC METABOLIC PNL TOTAL CA: CPT

## 2025-02-07 PROCEDURE — 6370000000 HC RX 637 (ALT 250 FOR IP)

## 2025-02-07 PROCEDURE — 93010 ELECTROCARDIOGRAM REPORT: CPT | Performed by: INTERNAL MEDICINE

## 2025-02-07 PROCEDURE — 80061 LIPID PANEL: CPT

## 2025-02-07 PROCEDURE — 99221 1ST HOSP IP/OBS SF/LOW 40: CPT | Performed by: SURGERY

## 2025-02-07 PROCEDURE — 1200000000 HC SEMI PRIVATE

## 2025-02-07 PROCEDURE — 36415 COLL VENOUS BLD VENIPUNCTURE: CPT

## 2025-02-07 PROCEDURE — 83735 ASSAY OF MAGNESIUM: CPT

## 2025-02-07 PROCEDURE — 6360000002 HC RX W HCPCS

## 2025-02-07 PROCEDURE — 83540 ASSAY OF IRON: CPT

## 2025-02-07 PROCEDURE — 83550 IRON BINDING TEST: CPT

## 2025-02-07 PROCEDURE — 93306 TTE W/DOPPLER COMPLETE: CPT

## 2025-02-07 PROCEDURE — 2500000003 HC RX 250 WO HCPCS

## 2025-02-07 PROCEDURE — 80076 HEPATIC FUNCTION PANEL: CPT

## 2025-02-07 PROCEDURE — 84443 ASSAY THYROID STIM HORMONE: CPT

## 2025-02-07 PROCEDURE — 6360000002 HC RX W HCPCS: Performed by: NURSE PRACTITIONER

## 2025-02-07 PROCEDURE — 93306 TTE W/DOPPLER COMPLETE: CPT | Performed by: INTERNAL MEDICINE

## 2025-02-07 PROCEDURE — 82728 ASSAY OF FERRITIN: CPT

## 2025-02-07 PROCEDURE — 94760 N-INVAS EAR/PLS OXIMETRY 1: CPT

## 2025-02-07 PROCEDURE — 99223 1ST HOSP IP/OBS HIGH 75: CPT | Performed by: INTERNAL MEDICINE

## 2025-02-07 PROCEDURE — 6370000000 HC RX 637 (ALT 250 FOR IP): Performed by: INTERNAL MEDICINE

## 2025-02-07 PROCEDURE — 94640 AIRWAY INHALATION TREATMENT: CPT

## 2025-02-07 PROCEDURE — 99222 1ST HOSP IP/OBS MODERATE 55: CPT | Performed by: INTERNAL MEDICINE

## 2025-02-07 PROCEDURE — 85520 HEPARIN ASSAY: CPT

## 2025-02-07 RX ORDER — HEPARIN SODIUM 1000 [USP'U]/ML
2000 INJECTION, SOLUTION INTRAVENOUS; SUBCUTANEOUS ONCE
Status: COMPLETED | OUTPATIENT
Start: 2025-02-07 | End: 2025-02-07

## 2025-02-07 RX ORDER — PREDNISONE 20 MG/1
60 TABLET ORAL DAILY
Status: DISCONTINUED | OUTPATIENT
Start: 2025-02-07 | End: 2025-02-12 | Stop reason: HOSPADM

## 2025-02-07 RX ORDER — HEPARIN SODIUM 10000 [USP'U]/100ML
0-4000 INJECTION, SOLUTION INTRAVENOUS CONTINUOUS
Status: DISCONTINUED | OUTPATIENT
Start: 2025-02-07 | End: 2025-02-07

## 2025-02-07 RX ADMIN — ASPIRIN 81 MG: 81 TABLET, COATED ORAL at 09:13

## 2025-02-07 RX ADMIN — FUROSEMIDE 40 MG: 10 INJECTION, SOLUTION INTRAMUSCULAR; INTRAVENOUS at 17:59

## 2025-02-07 RX ADMIN — HEPARIN SODIUM 2000 UNITS: 1000 INJECTION INTRAVENOUS; SUBCUTANEOUS at 02:40

## 2025-02-07 RX ADMIN — PANTOPRAZOLE SODIUM 40 MG: 40 TABLET, DELAYED RELEASE ORAL at 06:07

## 2025-02-07 RX ADMIN — AMLODIPINE BESYLATE 10 MG: 10 TABLET ORAL at 09:13

## 2025-02-07 RX ADMIN — HEPARIN SODIUM 1200 UNITS/HR: 10000 INJECTION, SOLUTION INTRAVENOUS at 07:27

## 2025-02-07 RX ADMIN — ATORVASTATIN CALCIUM 10 MG: 10 TABLET, FILM COATED ORAL at 09:13

## 2025-02-07 RX ADMIN — Medication 2 PUFF: at 19:26

## 2025-02-07 RX ADMIN — BUDESONIDE AND FORMOTEROL FUMARATE DIHYDRATE 2 PUFF: 160; 4.5 AEROSOL RESPIRATORY (INHALATION) at 19:26

## 2025-02-07 RX ADMIN — Medication 2 PUFF: at 08:32

## 2025-02-07 RX ADMIN — SODIUM CHLORIDE, PRESERVATIVE FREE 10 ML: 5 INJECTION INTRAVENOUS at 09:15

## 2025-02-07 RX ADMIN — PREDNISONE 60 MG: 20 TABLET ORAL at 14:40

## 2025-02-07 RX ADMIN — FUROSEMIDE 40 MG: 10 INJECTION, SOLUTION INTRAMUSCULAR; INTRAVENOUS at 09:13

## 2025-02-07 RX ADMIN — SODIUM CHLORIDE, PRESERVATIVE FREE 10 ML: 5 INJECTION INTRAVENOUS at 22:57

## 2025-02-07 RX ADMIN — LISINOPRIL 5 MG: 5 TABLET ORAL at 09:14

## 2025-02-07 RX ADMIN — CETIRIZINE HYDROCHLORIDE 10 MG: 10 TABLET, FILM COATED ORAL at 09:14

## 2025-02-07 RX ADMIN — BUDESONIDE AND FORMOTEROL FUMARATE DIHYDRATE 2 PUFF: 160; 4.5 AEROSOL RESPIRATORY (INHALATION) at 08:32

## 2025-02-07 NOTE — CARE COORDINATION
Advance Care Planning   Healthcare Decision Maker:    Primary Decision Maker: Thorpe,Laura - Spouse - [phone]    Secondary Decision Maker: CandelariaTanja - Other - [phone]    Today we documented Decision Maker(s) consistent with Legal Next of Kin hierarchy.       Electronically signed by Pam Velásquez RN on 2/7/2025 at 2:38 PM

## 2025-02-07 NOTE — CARE COORDINATION
Mechelle Returned call, States he is on the wait list for a  to reach out, expect a call in about a month, patient made aware.    Electronically signed by Pam Velásquez RN on 2/7/2025 at 3:39 PM       LVM with Mechelle (Patient Intake) at [facility]. [phone]. Requested for her to reach out to patient directly.     Electronically signed by Pam Velásquez RN on 2/7/2025 at 3:08 PM

## 2025-02-07 NOTE — CARE COORDINATION
Case Management Assessment  Initial Evaluation    Date/Time of Evaluation: 2/7/2025 2:34 PM  Assessment Completed by: Pam Velásquez RN    If patient is discharged prior to next notation, then this note serves as note for discharge by case management.    Patient Name: Home Thorpe II                   YOB: 1955  Diagnosis: Shortness of breath [R06.02]  Abnormal EKG [R94.31]  Goals of care, counseling/discussion [Z71.89]  Acute congestive heart failure, unspecified heart failure type (HCC) [I50.9]                   Date / Time: 2/6/2025  5:49 PM    Patient Admission Status: Inpatient   Readmission Risk (Low < 19, Mod (19-27), High > 27): Readmission Risk Score: 17.2    Current PCP: Mariya Daily MD  PCP verified by CM? Yes    Chart Reviewed: Yes      History Provided by: Patient  Patient Orientation: Alert and Oriented    Patient Cognition: Alert    Hospitalization in the last 30 days (Readmission):  No    If yes, Readmission Assessment in  Navigator will be completed.    Advance Directives:      Code Status: Full Code   Patient's Primary Decision Maker is: Legal Next of Kin    Primary Decision Maker: Laura Thorpe - Spouse - [phone]    Secondary Decision Maker: Tanja Gaona - Other - [phone]    Discharge Planning:    Patient lives with: Spouse/Significant Other, Children Type of Home: Apartment (10 steps)  Primary Care Giver: Self  Patient Support Systems include: Spouse/Significant Other, Children   Current Financial resources: Medicare  Current community resources: None  Current services prior to admission: C-pap, Durable Medical Equipment (Walking Stick for steps, Shower Chair)            Current DME: Other (Comment), Cpap (Walking Stick, not actively using CPAP)            Type of Home Care services:  None    ADLS  Prior functional level: Assistance with the following:, Housework, Cooking  Current functional level: Independent in ADLs/IADLs, Cooking, Assistance with the

## 2025-02-07 NOTE — ED NOTES
External catheter placed on pt due to lasix administration and need for documentation of output. Pt tolerated well

## 2025-02-07 NOTE — H&P
V2.0  History and Physical      Name:  Home Thorpe II /Age/Sex: 1955  (69 y.o. male)   MRN & CSN:  [id number] & [id number] Encounter Date/Time: 2025 10:36 PM EST   Location:   PCP: Mariya Daily MD       Hospital Day: 1    Assessment and Plan:   Home Thorpe II is a 69 y.o. male with a pmh of hypertension, hyperlipidemia, obstructive sleep apnea noncompliant with CPAP, obesity, recently diagnosed with giant cell arteritis on prednisone who presents with Shortness of breath    Hospital Problems             Last Modified POA    * (Principal) Shortness of breath 2025 Yes       Plan:  # Dyspnea on exertion  # Bilateral lower extremity edema  - Blood pressure 143/95, HR 95, on room air saturating at 97%.  - Troponin 19 x 2,   - Chest x-ray no acute process.  Large hiatal hernia  - In the ED patient received IV Lasix 40 mg  - Will continue IV Lasix 40 mg twice daily  - Strict I's and O's and daily weight  - Will obtain 2D echocardiogram  - Will consult cardiology    #Leukocytosis secondary to prednisone    #Obstructive sleep apnea noncompliant with CPAP  - Will order CPAP    DVT Prophylaxis: Lovenox  Diet: Regular diet  Code Status: Full code      Disposition:   Current Living situation: Home  Expected Disposition: Home  Estimated D/C: 2-3    Diet ADULT DIET; Regular; No Added Salt (3-4 gm)   DVT Prophylaxis [x] Lovenox, []  Heparin, [] SCDs, [] Ambulation,  [] Eliquis, [] Xarelto, [] Coumadin   Code Status Full Code   Surrogate Decision Maker/ POA          History from:     patient    History of Present Illness:     Chief Complaint:   Home Thorpe II is a 69 y.o. male with pmh of hypertension, hyperlipidemia, obstructive sleep apnea noncompliant with CPAP, obesity, recently diagnosed with giant cell arteritis on prednisone who presents with shortness of breath.  Patient was seen in the room awake alert oriented to person place and situation.  Patient stated he was not

## 2025-02-07 NOTE — ED PROVIDER NOTES
Reviewed: Previous patient encounter documents & history available on EMR was reviewed:   Record from: Patient was seen on 11/22/2024 for vision changes      Vitals Reviewed:    Vitals:    02/06/25 1746 02/06/25 1900   BP: (!) 168/90 (!) 153/90   Pulse: 99 90   Resp: 18 16   Temp:  97.7 °F (36.5 °C)   TempSrc:  Oral   SpO2: 94% 99%   Weight:  100.9 kg (222 lb 7.1 oz)   Height:  1.651 m (5' 5\")       MDM:   This is a 69-year-old male comes in with exertional shortness of breath that began 2 hours prior arrival.  No significant chest pain no significant nausea vomiting or diaphoresis.  Initial EKG was concerning for STEMI with ST segment ovation of 1 and aVL as well as some depressions in 3 and aVF with T wave inversions that are deeper than normal.  There is some benign early repolarization component to 1 aVL this is discussed with the on-call interventional cardiologist after stimulant was.  At this time since patient is asymptomatic and there is no concavity to his ST segments we held off on proceeding to the Cath Lab.  Recommend heparinization and laboratory follow-up.  This patient will be admitted and his care will be completed by the DEVENDRA      Consults:  Interventional cardiologist Dr. Rivera who reviewed the case and EKG with myself, believe this to be more likely benign early realization.  Patient is without chest pain at this time and recommends lab work heparinization and close follow-up and contacting if any changes to his symptom      Code Status:    Full code    CRITICAL CARE  I personally saw the patient and independently provided 24 minutes of non-concurrent critical care out of the total shared critical care time provided.   This excludes seperately billable procedures. Critical care time was provided for a STEMI activation multiple consultations with an interventional cardiology that required close evaluation and/or intervention with concern for potential patient decompensation.    For further details 
additional recommendations for the patient.  His service will follow. [LL]      ED Course User Index  [AL] Jaylan Mills MD  [LL] Salvador Plunkett, APRN - CNP        Is this patient to be included in the SEP-1 Core Measure due to severe sepsis or septic shock?   No   Exclusion criteria - the patient is NOT to be included for SEP-1 Core Measure due to:  Infection is not suspected    CONSULTS: (Who and What was discussed)  IP CONSULT TO CARDIOLOGY  IP CONSULT TO HOSPITALIST      Social Determinants : None    Records Reviewed : None    CC/HPI Summary, DDx, ED Course, and Reassessment: Patient presents emergency department for evaluation of exertional shortness of breath and elevated blood pressure readings at home.  Patient had no elevation troponin x 2, BNP not extremely elevated, leukocytosis secondary to steroid use, chronic anemia, no electrolyte derangement or renal dysfunction, mild hyperglycemia 129 mg/dL negative COVID-19, and no acute findings on CXR.  Patient has known chronic hiatal hernia as well as an inguinal hernia left.  Given the patient's exertional shortness of breath and 3+ bilateral lower extremity edema he will be treated for CHF and require echo to further evaluate his heart.  Patient and wife are with plan for admission.  We discussed goals of care and patient will be a full code    Disposition Considerations (include 1 Tests not done, Shared Decision Making, Pt Expectation of Test or Tx.):   Inappropriate for outpatient management      I am the Primary Clinician of Record.    FINAL IMPRESSION        ICD-10-CM    1. Acute congestive heart failure, unspecified heart failure type (HCC)  I50.9       2. Abnormal EKG  R94.31       3. Shortness of breath  R06.02 Echo (TTE) complete (PRN contrast/bubble/strain/3D)     Echo (TTE) complete (PRN contrast/bubble/strain/3D)      4. Goals of care, counseling/discussion  Z71.89     Full code              DISPOSITION/PLAN     DISPOSITION Decision

## 2025-02-07 NOTE — DISCHARGE INSTRUCTIONS
OPHTHALMOLOGY APPT- 2/11/25 @1250PM  BRING ID, INSURANCE CARD, AND CURRENT MED LIST.  LOCATION- [address] [facility] ([address]), [place] [address], [phone]

## 2025-02-08 PROBLEM — R60.0 LOWER LEG EDEMA: Status: ACTIVE | Noted: 2025-01-01

## 2025-02-08 PROBLEM — R94.31 ABNORMAL EKG: Status: ACTIVE | Noted: 2025-02-08

## 2025-02-08 PROBLEM — T38.0X5A PREDNISONE ADVERSE REACTION: Status: ACTIVE | Noted: 2025-02-08

## 2025-02-08 PROBLEM — E66.01 MORBID OBESITY DUE TO EXCESS CALORIES: Status: ACTIVE | Noted: 2025-01-01

## 2025-02-08 LAB
ANION GAP SERPL CALCULATED.3IONS-SCNC: 8 MMOL/L (ref 3–16)
BUN SERPL-MCNC: 24 MG/DL (ref 7–20)
CALCIUM SERPL-MCNC: 8.6 MG/DL (ref 8.3–10.6)
CHLORIDE SERPL-SCNC: 101 MMOL/L (ref 99–110)
CO2 SERPL-SCNC: 31 MMOL/L (ref 21–32)
CREAT SERPL-MCNC: 0.9 MG/DL (ref 0.8–1.3)
GFR SERPLBLD CREATININE-BSD FMLA CKD-EPI: >90 ML/MIN/{1.73_M2}
GLUCOSE BLD-MCNC: 171 MG/DL (ref 70–99)
GLUCOSE BLD-MCNC: 261 MG/DL (ref 70–99)
GLUCOSE SERPL-MCNC: 158 MG/DL (ref 70–99)
MAGNESIUM SERPL-MCNC: 2.4 MG/DL (ref 1.8–2.4)
PERFORMED ON: ABNORMAL
PERFORMED ON: ABNORMAL
POTASSIUM SERPL-SCNC: 3.9 MMOL/L (ref 3.5–5.1)
SODIUM SERPL-SCNC: 140 MMOL/L (ref 136–145)

## 2025-02-08 PROCEDURE — 6370000000 HC RX 637 (ALT 250 FOR IP): Performed by: INTERNAL MEDICINE

## 2025-02-08 PROCEDURE — 85027 COMPLETE CBC AUTOMATED: CPT

## 2025-02-08 PROCEDURE — 80048 BASIC METABOLIC PNL TOTAL CA: CPT

## 2025-02-08 PROCEDURE — 2500000003 HC RX 250 WO HCPCS

## 2025-02-08 PROCEDURE — 83735 ASSAY OF MAGNESIUM: CPT

## 2025-02-08 PROCEDURE — 6370000000 HC RX 637 (ALT 250 FOR IP)

## 2025-02-08 PROCEDURE — 36415 COLL VENOUS BLD VENIPUNCTURE: CPT

## 2025-02-08 PROCEDURE — 1200000000 HC SEMI PRIVATE

## 2025-02-08 PROCEDURE — 94760 N-INVAS EAR/PLS OXIMETRY 1: CPT

## 2025-02-08 PROCEDURE — 94640 AIRWAY INHALATION TREATMENT: CPT

## 2025-02-08 PROCEDURE — 6360000002 HC RX W HCPCS

## 2025-02-08 PROCEDURE — 6360000002 HC RX W HCPCS: Performed by: INTERNAL MEDICINE

## 2025-02-08 RX ORDER — GLUCAGON 1 MG/ML
1 KIT INJECTION PRN
Status: DISCONTINUED | OUTPATIENT
Start: 2025-02-08 | End: 2025-02-12 | Stop reason: HOSPADM

## 2025-02-08 RX ORDER — INSULIN LISPRO 100 [IU]/ML
0-4 INJECTION, SOLUTION INTRAVENOUS; SUBCUTANEOUS
Status: DISCONTINUED | OUTPATIENT
Start: 2025-02-08 | End: 2025-02-12 | Stop reason: HOSPADM

## 2025-02-08 RX ORDER — SODIUM FERRIC GLUCONATE COMPLEX IN SUCROSE 12.5 MG/ML
125 INJECTION INTRAVENOUS ONCE
Status: COMPLETED | OUTPATIENT
Start: 2025-02-08 | End: 2025-02-08

## 2025-02-08 RX ORDER — DEXTROSE MONOHYDRATE 100 MG/ML
INJECTION, SOLUTION INTRAVENOUS CONTINUOUS PRN
Status: DISCONTINUED | OUTPATIENT
Start: 2025-02-08 | End: 2025-02-12 | Stop reason: HOSPADM

## 2025-02-08 RX ADMIN — Medication 2 PUFF: at 20:42

## 2025-02-08 RX ADMIN — PREDNISONE 60 MG: 20 TABLET ORAL at 10:07

## 2025-02-08 RX ADMIN — INSULIN LISPRO 2 UNITS: 100 INJECTION, SOLUTION INTRAVENOUS; SUBCUTANEOUS at 21:22

## 2025-02-08 RX ADMIN — SODIUM CHLORIDE, PRESERVATIVE FREE 10 ML: 5 INJECTION INTRAVENOUS at 10:08

## 2025-02-08 RX ADMIN — FUROSEMIDE 40 MG: 10 INJECTION, SOLUTION INTRAMUSCULAR; INTRAVENOUS at 10:08

## 2025-02-08 RX ADMIN — ASPIRIN 81 MG: 81 TABLET, COATED ORAL at 10:07

## 2025-02-08 RX ADMIN — BUDESONIDE AND FORMOTEROL FUMARATE DIHYDRATE 2 PUFF: 160; 4.5 AEROSOL RESPIRATORY (INHALATION) at 09:34

## 2025-02-08 RX ADMIN — SODIUM CHLORIDE, PRESERVATIVE FREE 10 ML: 5 INJECTION INTRAVENOUS at 17:52

## 2025-02-08 RX ADMIN — LISINOPRIL 5 MG: 5 TABLET ORAL at 10:07

## 2025-02-08 RX ADMIN — SODIUM FERRIC GLUCONATE COMPLEX 125 MG: 12.5 INJECTION INTRAVENOUS at 14:34

## 2025-02-08 RX ADMIN — AMLODIPINE BESYLATE 10 MG: 10 TABLET ORAL at 10:07

## 2025-02-08 RX ADMIN — SODIUM CHLORIDE, PRESERVATIVE FREE 10 ML: 5 INJECTION INTRAVENOUS at 21:10

## 2025-02-08 RX ADMIN — SODIUM CHLORIDE, PRESERVATIVE FREE 10 ML: 5 INJECTION INTRAVENOUS at 14:35

## 2025-02-08 RX ADMIN — BUDESONIDE AND FORMOTEROL FUMARATE DIHYDRATE 2 PUFF: 160; 4.5 AEROSOL RESPIRATORY (INHALATION) at 20:42

## 2025-02-08 RX ADMIN — Medication 2 PUFF: at 09:34

## 2025-02-08 RX ADMIN — FUROSEMIDE 40 MG: 10 INJECTION, SOLUTION INTRAMUSCULAR; INTRAVENOUS at 17:52

## 2025-02-08 RX ADMIN — PANTOPRAZOLE SODIUM 40 MG: 40 TABLET, DELAYED RELEASE ORAL at 06:11

## 2025-02-08 RX ADMIN — ATORVASTATIN CALCIUM 10 MG: 10 TABLET, FILM COATED ORAL at 10:07

## 2025-02-08 ASSESSMENT — PAIN SCALES - GENERAL
PAINLEVEL_OUTOF10: 0

## 2025-02-09 LAB
ANION GAP SERPL CALCULATED.3IONS-SCNC: 9 MMOL/L (ref 3–16)
BUN SERPL-MCNC: 26 MG/DL (ref 7–20)
CALCIUM SERPL-MCNC: 8.8 MG/DL (ref 8.3–10.6)
CHLORIDE SERPL-SCNC: 99 MMOL/L (ref 99–110)
CO2 SERPL-SCNC: 31 MMOL/L (ref 21–32)
CREAT SERPL-MCNC: 0.9 MG/DL (ref 0.8–1.3)
EST. AVERAGE GLUCOSE BLD GHB EST-MCNC: 142.7 MG/DL
GFR SERPLBLD CREATININE-BSD FMLA CKD-EPI: >90 ML/MIN/{1.73_M2}
GLUCOSE BLD-MCNC: 110 MG/DL (ref 70–99)
GLUCOSE BLD-MCNC: 139 MG/DL (ref 70–99)
GLUCOSE BLD-MCNC: 188 MG/DL (ref 70–99)
GLUCOSE BLD-MCNC: 212 MG/DL (ref 70–99)
GLUCOSE BLD-MCNC: 241 MG/DL (ref 70–99)
GLUCOSE SERPL-MCNC: 105 MG/DL (ref 70–99)
HBA1C MFR BLD: 6.6 %
MAGNESIUM SERPL-MCNC: 2.32 MG/DL (ref 1.8–2.4)
PERFORMED ON: ABNORMAL
POTASSIUM SERPL-SCNC: 3.3 MMOL/L (ref 3.5–5.1)
SODIUM SERPL-SCNC: 139 MMOL/L (ref 136–145)

## 2025-02-09 PROCEDURE — 1200000000 HC SEMI PRIVATE

## 2025-02-09 PROCEDURE — 80048 BASIC METABOLIC PNL TOTAL CA: CPT

## 2025-02-09 PROCEDURE — 94640 AIRWAY INHALATION TREATMENT: CPT

## 2025-02-09 PROCEDURE — 6370000000 HC RX 637 (ALT 250 FOR IP)

## 2025-02-09 PROCEDURE — 36415 COLL VENOUS BLD VENIPUNCTURE: CPT

## 2025-02-09 PROCEDURE — 6370000000 HC RX 637 (ALT 250 FOR IP): Performed by: INTERNAL MEDICINE

## 2025-02-09 PROCEDURE — 83735 ASSAY OF MAGNESIUM: CPT

## 2025-02-09 PROCEDURE — 83036 HEMOGLOBIN GLYCOSYLATED A1C: CPT

## 2025-02-09 PROCEDURE — 94760 N-INVAS EAR/PLS OXIMETRY 1: CPT

## 2025-02-09 PROCEDURE — 2500000003 HC RX 250 WO HCPCS

## 2025-02-09 PROCEDURE — 6360000002 HC RX W HCPCS

## 2025-02-09 RX ADMIN — Medication 2 PUFF: at 19:45

## 2025-02-09 RX ADMIN — INSULIN LISPRO 1 UNITS: 100 INJECTION, SOLUTION INTRAVENOUS; SUBCUTANEOUS at 17:35

## 2025-02-09 RX ADMIN — BUDESONIDE AND FORMOTEROL FUMARATE DIHYDRATE 2 PUFF: 160; 4.5 AEROSOL RESPIRATORY (INHALATION) at 09:29

## 2025-02-09 RX ADMIN — SODIUM CHLORIDE, PRESERVATIVE FREE 10 ML: 5 INJECTION INTRAVENOUS at 17:34

## 2025-02-09 RX ADMIN — Medication 2 PUFF: at 09:29

## 2025-02-09 RX ADMIN — INSULIN LISPRO 1 UNITS: 100 INJECTION, SOLUTION INTRAVENOUS; SUBCUTANEOUS at 13:33

## 2025-02-09 RX ADMIN — AMLODIPINE BESYLATE 10 MG: 10 TABLET ORAL at 08:07

## 2025-02-09 RX ADMIN — LISINOPRIL 5 MG: 5 TABLET ORAL at 08:07

## 2025-02-09 RX ADMIN — FUROSEMIDE 40 MG: 10 INJECTION, SOLUTION INTRAMUSCULAR; INTRAVENOUS at 17:34

## 2025-02-09 RX ADMIN — BUDESONIDE AND FORMOTEROL FUMARATE DIHYDRATE 2 PUFF: 160; 4.5 AEROSOL RESPIRATORY (INHALATION) at 19:45

## 2025-02-09 RX ADMIN — ATORVASTATIN CALCIUM 10 MG: 10 TABLET, FILM COATED ORAL at 08:07

## 2025-02-09 RX ADMIN — PANTOPRAZOLE SODIUM 40 MG: 40 TABLET, DELAYED RELEASE ORAL at 06:17

## 2025-02-09 RX ADMIN — ASPIRIN 81 MG: 81 TABLET, COATED ORAL at 08:07

## 2025-02-09 RX ADMIN — POTASSIUM CHLORIDE 40 MEQ: 1500 TABLET, EXTENDED RELEASE ORAL at 14:37

## 2025-02-09 RX ADMIN — SODIUM CHLORIDE, PRESERVATIVE FREE 10 ML: 5 INJECTION INTRAVENOUS at 08:06

## 2025-02-09 RX ADMIN — SODIUM CHLORIDE, PRESERVATIVE FREE 10 ML: 5 INJECTION INTRAVENOUS at 19:37

## 2025-02-09 RX ADMIN — FUROSEMIDE 40 MG: 10 INJECTION, SOLUTION INTRAMUSCULAR; INTRAVENOUS at 08:06

## 2025-02-09 RX ADMIN — PREDNISONE 60 MG: 20 TABLET ORAL at 08:07

## 2025-02-09 ASSESSMENT — PAIN SCALES - GENERAL
PAINLEVEL_OUTOF10: 0

## 2025-02-10 LAB
ANION GAP SERPL CALCULATED.3IONS-SCNC: 12 MMOL/L (ref 3–16)
BUN SERPL-MCNC: 30 MG/DL (ref 7–20)
CALCIUM SERPL-MCNC: 9.2 MG/DL (ref 8.3–10.6)
CHLORIDE SERPL-SCNC: 98 MMOL/L (ref 99–110)
CO2 SERPL-SCNC: 29 MMOL/L (ref 21–32)
CREAT SERPL-MCNC: 1 MG/DL (ref 0.8–1.3)
DEPRECATED RDW RBC AUTO: 20.9 % (ref 12.4–15.4)
DEPRECATED RDW RBC AUTO: 21.3 % (ref 12.4–15.4)
GFR SERPLBLD CREATININE-BSD FMLA CKD-EPI: 81 ML/MIN/{1.73_M2}
GLUCOSE BLD-MCNC: 102 MG/DL (ref 70–99)
GLUCOSE BLD-MCNC: 164 MG/DL (ref 70–99)
GLUCOSE BLD-MCNC: 179 MG/DL (ref 70–99)
GLUCOSE BLD-MCNC: 222 MG/DL (ref 70–99)
GLUCOSE SERPL-MCNC: 127 MG/DL (ref 70–99)
HCT VFR BLD AUTO: 31.3 % (ref 40.5–52.5)
HCT VFR BLD AUTO: 34.6 % (ref 40.5–52.5)
HGB BLD-MCNC: 10.5 G/DL (ref 13.5–17.5)
HGB BLD-MCNC: 9.5 G/DL (ref 13.5–17.5)
MAGNESIUM SERPL-MCNC: 2.36 MG/DL (ref 1.8–2.4)
MCH RBC QN AUTO: 20.5 PG (ref 26–34)
MCH RBC QN AUTO: 20.6 PG (ref 26–34)
MCHC RBC AUTO-ENTMCNC: 30.4 G/DL (ref 31–36)
MCHC RBC AUTO-ENTMCNC: 30.4 G/DL (ref 31–36)
MCV RBC AUTO: 67.4 FL (ref 80–100)
MCV RBC AUTO: 67.7 FL (ref 80–100)
PATH INTERP BLD-IMP: NO
PATH INTERP BLD-IMP: NO
PERFORMED ON: ABNORMAL
PLATELET # BLD AUTO: 306 K/UL (ref 135–450)
PLATELET # BLD AUTO: 369 K/UL (ref 135–450)
PMV BLD AUTO: 8 FL (ref 5–10.5)
PMV BLD AUTO: 8.1 FL (ref 5–10.5)
POTASSIUM SERPL-SCNC: 3.3 MMOL/L (ref 3.5–5.1)
RBC # BLD AUTO: 4.64 M/UL (ref 4.2–5.9)
RBC # BLD AUTO: 5.11 M/UL (ref 4.2–5.9)
SODIUM SERPL-SCNC: 139 MMOL/L (ref 136–145)
WBC # BLD AUTO: 12.2 K/UL (ref 4–11)
WBC # BLD AUTO: 17.9 K/UL (ref 4–11)

## 2025-02-10 PROCEDURE — 97530 THERAPEUTIC ACTIVITIES: CPT

## 2025-02-10 PROCEDURE — 6370000000 HC RX 637 (ALT 250 FOR IP)

## 2025-02-10 PROCEDURE — 80048 BASIC METABOLIC PNL TOTAL CA: CPT

## 2025-02-10 PROCEDURE — 97161 PT EVAL LOW COMPLEX 20 MIN: CPT

## 2025-02-10 PROCEDURE — 97110 THERAPEUTIC EXERCISES: CPT

## 2025-02-10 PROCEDURE — 6370000000 HC RX 637 (ALT 250 FOR IP): Performed by: INTERNAL MEDICINE

## 2025-02-10 PROCEDURE — 36415 COLL VENOUS BLD VENIPUNCTURE: CPT

## 2025-02-10 PROCEDURE — 97165 OT EVAL LOW COMPLEX 30 MIN: CPT

## 2025-02-10 PROCEDURE — 85027 COMPLETE CBC AUTOMATED: CPT

## 2025-02-10 PROCEDURE — 6360000002 HC RX W HCPCS

## 2025-02-10 PROCEDURE — 97535 SELF CARE MNGMENT TRAINING: CPT

## 2025-02-10 PROCEDURE — 83735 ASSAY OF MAGNESIUM: CPT

## 2025-02-10 PROCEDURE — 2500000003 HC RX 250 WO HCPCS

## 2025-02-10 PROCEDURE — 94640 AIRWAY INHALATION TREATMENT: CPT

## 2025-02-10 PROCEDURE — 1200000000 HC SEMI PRIVATE

## 2025-02-10 PROCEDURE — 94760 N-INVAS EAR/PLS OXIMETRY 1: CPT

## 2025-02-10 RX ORDER — DOXYCYCLINE HYCLATE 100 MG
100 TABLET ORAL EVERY 12 HOURS SCHEDULED
Status: DISCONTINUED | OUTPATIENT
Start: 2025-02-10 | End: 2025-02-12 | Stop reason: HOSPADM

## 2025-02-10 RX ADMIN — FUROSEMIDE 40 MG: 10 INJECTION, SOLUTION INTRAMUSCULAR; INTRAVENOUS at 17:27

## 2025-02-10 RX ADMIN — DOXYCYCLINE HYCLATE 100 MG: 100 TABLET, COATED ORAL at 21:27

## 2025-02-10 RX ADMIN — Medication 2 PUFF: at 09:56

## 2025-02-10 RX ADMIN — ASPIRIN 81 MG: 81 TABLET, COATED ORAL at 08:13

## 2025-02-10 RX ADMIN — POTASSIUM CHLORIDE 40 MEQ: 1500 TABLET, EXTENDED RELEASE ORAL at 07:30

## 2025-02-10 RX ADMIN — FUROSEMIDE 40 MG: 10 INJECTION, SOLUTION INTRAMUSCULAR; INTRAVENOUS at 08:15

## 2025-02-10 RX ADMIN — SODIUM CHLORIDE, PRESERVATIVE FREE 10 ML: 5 INJECTION INTRAVENOUS at 08:17

## 2025-02-10 RX ADMIN — Medication 2 PUFF: at 20:19

## 2025-02-10 RX ADMIN — ACETAMINOPHEN 650 MG: 325 TABLET ORAL at 08:13

## 2025-02-10 RX ADMIN — PREDNISONE 60 MG: 20 TABLET ORAL at 08:13

## 2025-02-10 RX ADMIN — BUDESONIDE AND FORMOTEROL FUMARATE DIHYDRATE 2 PUFF: 160; 4.5 AEROSOL RESPIRATORY (INHALATION) at 10:01

## 2025-02-10 RX ADMIN — PANTOPRAZOLE SODIUM 40 MG: 40 TABLET, DELAYED RELEASE ORAL at 07:30

## 2025-02-10 RX ADMIN — LISINOPRIL 5 MG: 5 TABLET ORAL at 08:13

## 2025-02-10 RX ADMIN — SODIUM CHLORIDE, PRESERVATIVE FREE 10 ML: 5 INJECTION INTRAVENOUS at 21:27

## 2025-02-10 RX ADMIN — INSULIN LISPRO 1 UNITS: 100 INJECTION, SOLUTION INTRAVENOUS; SUBCUTANEOUS at 17:27

## 2025-02-10 RX ADMIN — BUDESONIDE AND FORMOTEROL FUMARATE DIHYDRATE 2 PUFF: 160; 4.5 AEROSOL RESPIRATORY (INHALATION) at 20:19

## 2025-02-10 RX ADMIN — AMLODIPINE BESYLATE 10 MG: 10 TABLET ORAL at 08:13

## 2025-02-10 RX ADMIN — ATORVASTATIN CALCIUM 10 MG: 10 TABLET, FILM COATED ORAL at 08:17

## 2025-02-10 ASSESSMENT — PAIN SCALES - GENERAL
PAINLEVEL_OUTOF10: 0
PAINLEVEL_OUTOF10: 0

## 2025-02-10 NOTE — CARE COORDINATION
Anticipate patient to discharge tonight, denies needs, Wife and Step-daughter will be transporting patient home.     Electronically signed by Pam Velásquez RN on 2/10/2025 at 4:11 PM

## 2025-02-11 ENCOUNTER — APPOINTMENT (OUTPATIENT)
Dept: CT IMAGING | Age: 70
DRG: 948 | End: 2025-02-11
Payer: MEDICARE

## 2025-02-11 LAB
ANION GAP SERPL CALCULATED.3IONS-SCNC: 10 MMOL/L (ref 3–16)
BUN SERPL-MCNC: 29 MG/DL (ref 7–20)
CALCIUM SERPL-MCNC: 9.1 MG/DL (ref 8.3–10.6)
CHLORIDE SERPL-SCNC: 98 MMOL/L (ref 99–110)
CO2 SERPL-SCNC: 30 MMOL/L (ref 21–32)
CREAT SERPL-MCNC: 1 MG/DL (ref 0.8–1.3)
GFR SERPLBLD CREATININE-BSD FMLA CKD-EPI: 81 ML/MIN/{1.73_M2}
GLUCOSE BLD-MCNC: 104 MG/DL (ref 70–99)
GLUCOSE BLD-MCNC: 150 MG/DL (ref 70–99)
GLUCOSE BLD-MCNC: 150 MG/DL (ref 70–99)
GLUCOSE BLD-MCNC: 158 MG/DL (ref 70–99)
GLUCOSE SERPL-MCNC: 109 MG/DL (ref 70–99)
MAGNESIUM SERPL-MCNC: 2.35 MG/DL (ref 1.8–2.4)
PERFORMED ON: ABNORMAL
POTASSIUM SERPL-SCNC: 3.6 MMOL/L (ref 3.5–5.1)
SODIUM SERPL-SCNC: 138 MMOL/L (ref 136–145)

## 2025-02-11 PROCEDURE — 36415 COLL VENOUS BLD VENIPUNCTURE: CPT

## 2025-02-11 PROCEDURE — 83735 ASSAY OF MAGNESIUM: CPT

## 2025-02-11 PROCEDURE — 97530 THERAPEUTIC ACTIVITIES: CPT

## 2025-02-11 PROCEDURE — 2500000003 HC RX 250 WO HCPCS

## 2025-02-11 PROCEDURE — 94640 AIRWAY INHALATION TREATMENT: CPT

## 2025-02-11 PROCEDURE — 6370000000 HC RX 637 (ALT 250 FOR IP): Performed by: INTERNAL MEDICINE

## 2025-02-11 PROCEDURE — 6370000000 HC RX 637 (ALT 250 FOR IP)

## 2025-02-11 PROCEDURE — 94760 N-INVAS EAR/PLS OXIMETRY 1: CPT

## 2025-02-11 PROCEDURE — 97110 THERAPEUTIC EXERCISES: CPT

## 2025-02-11 PROCEDURE — 99232 SBSQ HOSP IP/OBS MODERATE 35: CPT | Performed by: INTERNAL MEDICINE

## 2025-02-11 PROCEDURE — 73700 CT LOWER EXTREMITY W/O DYE: CPT

## 2025-02-11 PROCEDURE — 6360000002 HC RX W HCPCS

## 2025-02-11 PROCEDURE — 97535 SELF CARE MNGMENT TRAINING: CPT

## 2025-02-11 PROCEDURE — 1200000000 HC SEMI PRIVATE

## 2025-02-11 PROCEDURE — 80048 BASIC METABOLIC PNL TOTAL CA: CPT

## 2025-02-11 RX ADMIN — ATORVASTATIN CALCIUM 10 MG: 10 TABLET, FILM COATED ORAL at 09:00

## 2025-02-11 RX ADMIN — PREDNISONE 60 MG: 20 TABLET ORAL at 09:00

## 2025-02-11 RX ADMIN — SODIUM CHLORIDE, PRESERVATIVE FREE 10 ML: 5 INJECTION INTRAVENOUS at 20:00

## 2025-02-11 RX ADMIN — FUROSEMIDE 40 MG: 10 INJECTION, SOLUTION INTRAMUSCULAR; INTRAVENOUS at 09:03

## 2025-02-11 RX ADMIN — BUDESONIDE AND FORMOTEROL FUMARATE DIHYDRATE 2 PUFF: 160; 4.5 AEROSOL RESPIRATORY (INHALATION) at 07:58

## 2025-02-11 RX ADMIN — SODIUM CHLORIDE, PRESERVATIVE FREE 10 ML: 5 INJECTION INTRAVENOUS at 09:02

## 2025-02-11 RX ADMIN — DOXYCYCLINE HYCLATE 100 MG: 100 TABLET, COATED ORAL at 20:00

## 2025-02-11 RX ADMIN — LISINOPRIL 5 MG: 5 TABLET ORAL at 09:00

## 2025-02-11 RX ADMIN — ASPIRIN 81 MG: 81 TABLET, COATED ORAL at 09:00

## 2025-02-11 RX ADMIN — Medication 2 PUFF: at 20:23

## 2025-02-11 RX ADMIN — BUDESONIDE AND FORMOTEROL FUMARATE DIHYDRATE 2 PUFF: 160; 4.5 AEROSOL RESPIRATORY (INHALATION) at 20:23

## 2025-02-11 RX ADMIN — FUROSEMIDE 40 MG: 10 INJECTION, SOLUTION INTRAMUSCULAR; INTRAVENOUS at 18:48

## 2025-02-11 RX ADMIN — Medication 2 PUFF: at 07:59

## 2025-02-11 RX ADMIN — PANTOPRAZOLE SODIUM 40 MG: 40 TABLET, DELAYED RELEASE ORAL at 05:49

## 2025-02-11 RX ADMIN — DOXYCYCLINE HYCLATE 100 MG: 100 TABLET, COATED ORAL at 09:00

## 2025-02-11 ASSESSMENT — PAIN SCALES - GENERAL
PAINLEVEL_OUTOF10: 0
PAINLEVEL_OUTOF10: 0

## 2025-02-11 NOTE — CARE COORDINATION
Wound Referral Progress Note       NAME:  Home Thorpe II  MEDICAL RECORD NUMBER:  [id number]  AGE: 69 y.o.   GENDER: male  : 1955  TODAY'S DATE:  2025    Subjective   Reason for WOCN Evaluation and Assessment: fluid filled area to left outer ankle/redness        Home Thorpe II is a 69 y.o. male referred by:   Nursing    Wound Identification:  Wound Type: undetermined  Contributing Factors: none    Wound History: redness and edema noted yesterday  Current Wound Care Treatment:  SEUN    Patient Care Goal:  Pain Control        PAST MEDICAL HISTORY        Diagnosis Date    Acute congestive heart failure (HCC) 2025    Hyperlipidemia     Hypertension     Recurrent left inguinal hernia     S/P colonoscopy 2008    Scrotal hernia     Sleep apnea     uses CPAP       PAST SURGICAL HISTORY    Past Surgical History:   Procedure Laterality Date    COLONOSCOPY      COLONOSCOPY N/A 2023    COLONOSCOPY POLYPECTOMY SNARE/COLD BIOPSY performed by Kane Ji MD at [facility] ENDOSCOPY    ENDOSCOPY, COLON, DIAGNOSTIC      HERNIA REPAIR Left 2017    ATTEMPTED DAVINCI, CONVERTED OPEN RECURRENT LEFT INGUINAL hernia repair with mesh    INGUINAL HERNIA REPAIR  2017    DAVINCI LEFT INGUINAL HERNIA REPAIR WITH MESH    TEMPORAL ARTERY BIOPSY Bilateral 2024    BILATERAL TEMPORAL ARTERY BIOPSY performed by Ilya Griffin MD at [facility] OR    TONSILLECTOMY         FAMILY HISTORY    Family History   Problem Relation Age of Onset    Cancer Mother     High Blood Pressure Mother     High Blood Pressure Sister     High Cholesterol Sister        SOCIAL HISTORY    Social History     Tobacco Use    Smoking status: Former     Types: Cigars     Start date:      Quit date:      Years since quittin.1     Passive exposure: Past    Smokeless tobacco: Never   Vaping Use    Vaping status: Never Used   Substance Use Topics    Alcohol use: No    Drug use: No       ALLERGIES    Allergies

## 2025-02-11 NOTE — DISCHARGE INSTR - COC
Influenza, FLUAD, (age 65 y+), IM, Trivalent PF, 0.5mL 10/23/2019    Influenza, FLUZONE High Dose (age 65 y+), IM, Quadv, 0.7mL 09/15/2020    TDaP, ADACEL (age 10y-64y), BOOSTRIX (age 10y+), IM, 0.5mL 08/25/2016       Active Problems:  Patient Active Problem List   Diagnosis Code    Hypertension I10    Hyperlipidemia E78.5    Insomnia G47.00    Allergic rhinitis J30.9    Scrotal hernia K40.90    Recurrent inguinal hernia of left side without obstruction or gangrene K40.91    SABINO (obstructive sleep apnea) G47.33    Treatment-emergent central sleep apnea G47.39    Vision loss, bilateral H54.3    Giant cell arteritis (HCC) M31.6    Vision changes H53.9    Shortness of breath R06.02    Acute congestive heart failure (HCC) I50.9    Abnormal ECG R94.31    Localized edema R60.0    Morbid obesity due to excess calories E66.01    Prednisone adverse reaction T38.0X5A    Lower leg edema R60.0    Abnormal EKG R94.31       Isolation/Infection:   Isolation            No Isolation          Patient Infection Status       None to display                     Nurse Assessment:  Last Vital Signs: /82   Pulse (!) 107   Temp 98.5 °F (36.9 °C) (Oral)   Resp 16   Ht 1.651 m (5' 5\")   Wt 93.7 kg (206 lb 9.1 oz)   SpO2 95%   BMI 34.38 kg/m²     Last documented pain score (0-10 scale): Pain Level: 0  Last Weight:   Wt Readings from Last 1 Encounters:   02/11/25 93.7 kg (206 lb 9.1 oz)     Mental Status:  oriented and alert    IV Access:  - None    Nursing Mobility/ADLs:  Walking   Assisted  Transfer  Assisted  Bathing  Assisted  Dressing  Assisted  Toileting  Assisted  Feeding  Independent  Med Admin  Assisted  Med Delivery   whole    Wound Care Documentation and Therapy:  Incision 08/09/17 Abdomen  (Active)   Number of days: 2743       Incision 09/27/17 Abdomen  (Active)   Number of days: 2694       Incision 11/25/24 Face Lateral;Left (Active)   Number of days: 78       Incision 11/25/24 Face Lateral;Right (Active)   Number

## 2025-02-11 NOTE — CASE COMMUNICATION
Spoke with wife Laura, Discussed Discharge Planning, Explained patient not eligible for a SNF based on current mobility, offered home care and provided choices, Wife states she has used  [facility] ([facility]) before and States she would like to use this agency again. Referral made to [facility].    The Plan for Transition of Care is related to the following treatment goals: Strengthening    The Patient and/or patient representative Wife was provided with a choice of provider and agrees   with the discharge plan. [x] Yes [] No    Freedom of choice list was provided with basic dialogue that supports the patient's individualized plan of care/goals, treatment preferences and shares the quality data associated with the providers. [x] Yes [] No     Electronically signed by Pam Velásquez RN on 2/11/2025 at 9:09 AM       [facility] out-of-network, Patient wife has no preference, Referrals out, awaiting accepting Home Care Agency.     Electronically signed by Pam Velásquez RN on 2/11/2025 at 9:13 AM

## 2025-02-12 ENCOUNTER — TELEPHONE (OUTPATIENT)
Dept: FAMILY MEDICINE CLINIC | Age: 70
End: 2025-02-12

## 2025-02-12 VITALS
HEART RATE: 112 BPM | HEIGHT: 65 IN | DIASTOLIC BLOOD PRESSURE: 67 MMHG | WEIGHT: 205.03 LBS | OXYGEN SATURATION: 90 % | SYSTOLIC BLOOD PRESSURE: 120 MMHG | RESPIRATION RATE: 18 BRPM | TEMPERATURE: 99.6 F | BODY MASS INDEX: 34.16 KG/M2

## 2025-02-12 LAB
DEPRECATED RDW RBC AUTO: 21 % (ref 12.4–15.4)
GLUCOSE BLD-MCNC: 124 MG/DL (ref 70–99)
GLUCOSE BLD-MCNC: 126 MG/DL (ref 70–99)
HCT VFR BLD AUTO: 33 % (ref 40.5–52.5)
HGB BLD-MCNC: 9.9 G/DL (ref 13.5–17.5)
MCH RBC QN AUTO: 20.4 PG (ref 26–34)
MCHC RBC AUTO-ENTMCNC: 30.1 G/DL (ref 31–36)
MCV RBC AUTO: 67.9 FL (ref 80–100)
PATH INTERP BLD-IMP: NO
PERFORMED ON: ABNORMAL
PERFORMED ON: ABNORMAL
PLATELET # BLD AUTO: 314 K/UL (ref 135–450)
PMV BLD AUTO: 8.4 FL (ref 5–10.5)
RBC # BLD AUTO: 4.87 M/UL (ref 4.2–5.9)
WBC # BLD AUTO: 12.8 K/UL (ref 4–11)

## 2025-02-12 PROCEDURE — 6360000002 HC RX W HCPCS

## 2025-02-12 PROCEDURE — 85027 COMPLETE CBC AUTOMATED: CPT

## 2025-02-12 PROCEDURE — 2500000003 HC RX 250 WO HCPCS

## 2025-02-12 PROCEDURE — 36415 COLL VENOUS BLD VENIPUNCTURE: CPT

## 2025-02-12 PROCEDURE — 6370000000 HC RX 637 (ALT 250 FOR IP): Performed by: INTERNAL MEDICINE

## 2025-02-12 PROCEDURE — 99232 SBSQ HOSP IP/OBS MODERATE 35: CPT | Performed by: INTERNAL MEDICINE

## 2025-02-12 PROCEDURE — 97535 SELF CARE MNGMENT TRAINING: CPT

## 2025-02-12 PROCEDURE — 94761 N-INVAS EAR/PLS OXIMETRY MLT: CPT

## 2025-02-12 PROCEDURE — 6370000000 HC RX 637 (ALT 250 FOR IP)

## 2025-02-12 PROCEDURE — 94640 AIRWAY INHALATION TREATMENT: CPT

## 2025-02-12 PROCEDURE — 97530 THERAPEUTIC ACTIVITIES: CPT

## 2025-02-12 RX ORDER — FUROSEMIDE 40 MG/1
40 TABLET ORAL DAILY
Qty: 30 TABLET | Refills: 0 | Status: SHIPPED | OUTPATIENT
Start: 2025-02-12

## 2025-02-12 RX ORDER — LISINOPRIL 5 MG/1
5 TABLET ORAL DAILY
Qty: 30 TABLET | Refills: 0 | Status: SHIPPED | OUTPATIENT
Start: 2025-02-13

## 2025-02-12 RX ORDER — DIPHENHYDRAMINE HCL 25 MG
25 TABLET ORAL ONCE
Status: COMPLETED | OUTPATIENT
Start: 2025-02-12 | End: 2025-02-12

## 2025-02-12 RX ORDER — DOXYCYCLINE HYCLATE 100 MG
100 TABLET ORAL EVERY 12 HOURS SCHEDULED
Qty: 10 TABLET | Refills: 0 | Status: SHIPPED | OUTPATIENT
Start: 2025-02-12 | End: 2025-02-17

## 2025-02-12 RX ADMIN — ASPIRIN 81 MG: 81 TABLET, COATED ORAL at 10:00

## 2025-02-12 RX ADMIN — BUDESONIDE AND FORMOTEROL FUMARATE DIHYDRATE 2 PUFF: 160; 4.5 AEROSOL RESPIRATORY (INHALATION) at 08:08

## 2025-02-12 RX ADMIN — LISINOPRIL 5 MG: 5 TABLET ORAL at 10:00

## 2025-02-12 RX ADMIN — AMLODIPINE BESYLATE 10 MG: 10 TABLET ORAL at 10:00

## 2025-02-12 RX ADMIN — ATORVASTATIN CALCIUM 10 MG: 10 TABLET, FILM COATED ORAL at 10:00

## 2025-02-12 RX ADMIN — FUROSEMIDE 40 MG: 10 INJECTION, SOLUTION INTRAMUSCULAR; INTRAVENOUS at 10:00

## 2025-02-12 RX ADMIN — SODIUM CHLORIDE, PRESERVATIVE FREE 10 ML: 5 INJECTION INTRAVENOUS at 10:03

## 2025-02-12 RX ADMIN — DOXYCYCLINE HYCLATE 100 MG: 100 TABLET, COATED ORAL at 10:00

## 2025-02-12 RX ADMIN — Medication 2 PUFF: at 08:08

## 2025-02-12 RX ADMIN — PANTOPRAZOLE SODIUM 40 MG: 40 TABLET, DELAYED RELEASE ORAL at 05:40

## 2025-02-12 RX ADMIN — DIPHENHYDRAMINE HCL 25 MG: 25 TABLET ORAL at 14:03

## 2025-02-12 RX ADMIN — PREDNISONE 60 MG: 20 TABLET ORAL at 10:00

## 2025-02-12 NOTE — DISCHARGE SUMMARY
Details   lisinopril (PRINIVIL;ZESTRIL) 5 MG tablet Take 1 tablet by mouth daily  Qty: 30 tablet, Refills: 0      doxycycline hyclate (VIBRA-TABS) 100 MG tablet Take 1 tablet by mouth every 12 hours for 5 days  Qty: 10 tablet, Refills: 0      furosemide (LASIX) 40 MG tablet Take 1 tablet by mouth daily  Qty: 30 tablet, Refills: 0                Details   predniSONE (DELTASONE) 20 MG tablet Take 3 tablets by mouth daily      Methylcobalamin (B12-ACTIVE) 1 MG CHEW Take 1 tablet by mouth daily      aspirin 81 MG EC tablet Take 1 tablet by mouth daily      Magnesium Citrate (MAGNESIUM GUMMIES PO) Take by mouth daily      omeprazole (PRILOSEC) 20 MG delayed release capsule TAKE 1 CAPSULE BY MOUTH IN THE MORNING BEFORE BREAKFAST  Qty: 90 capsule, Refills: 3      albuterol sulfate HFA (PROVENTIL;VENTOLIN;PROAIR) 108 (90 Base) MCG/ACT inhaler Inhale 2 puffs into the lungs 2 times daily      SYMBICORT 160-4.5 MCG/ACT AERO Inhale 2 puffs into the lungs 2 times daily      loratadine (CLARITIN) 10 MG tablet Take 1 tablet by mouth daily      atorvastatin (LIPITOR) 10 MG tablet TAKE 1 TABLET BY MOUTH EVERY DAY  Qty: 90 tablet, Refills: 3    Associated Diagnoses: Pure hypercholesterolemia      amLODIPine (NORVASC) 10 MG tablet TAKE 1 TABLET BY MOUTH EVERY DAY FOR HIGH BLOOD PRESSURE  Qty: 90 tablet, Refills: 3    Associated Diagnoses: Essential hypertension             Time Spent on discharge: 34 MINUTES in the examination, evaluation, counseling and review of medications and discharge plan.      Signed:    Blaze Whelan MD   2/12/2025      Thank you Mariya Daily MD for the opportunity to be involved in this patient's care. If you have any questions or concerns, please feel free to contact me at [phone].    Comment: Please note this report has been produced using speech recognition software and may contain errors related to that system including errors in grammar, punctuation, and spelling, as well as words and

## 2025-02-12 NOTE — TELEPHONE ENCOUNTER
Inna with [facility] called to see if Dr Mujica would sign home health orders. He is being d/c today 2/12/25 and the hospital is ordering SN, PT, and OT. Please call back with verbal.

## 2025-02-12 NOTE — PROGRESS NOTES
V2.0    [facility] [place] Progress Note      Name:  Home Thorpe II /Age/Sex: 1955  (69 y.o. male)   MRN & CSN:  [id number] & [id number] Encounter Date/Time: 2/10/2025 10:08 AM EST   Location:  F1Q-8485/5264-01 PCP: Mariya Daily MD     Attending:Blaze Whelan MD       Hospital Day: 5    Assessment and Recommendations   Home Thorpe II is a 69 y.o. male who presents with Shortness of breath      Plan:   Thought to have acute on chronic congestive heart failure started on Lasix IV on admission cardiology consulted unlikely heart failure at this time okay to continue Lasix for pedal edema  History of temporal arteritis, on prednisone, per ID if patient will be on prednisone for more than 3 months this will likely need PJP prophylaxis with Bactrim will defer that to ophthalmology.  Patient will need to follow-up with ophthalmology as soon as he will be discharged from the hospital for further adjustment of prednisone dose patient does not have clear understanding of future dosing, needs an eye exam to follow-up on progress and to consider any prophylactic antibiotics if deemed necessary per ophthalmology  Multiple skin lesion due to skin picking ID consulted, no antibiotics recommended at this time  Obstructive sleep apnea on CPAP      Diet ADULT DIET; Regular; No Added Salt (3-4 gm); 1500 ml   DVT Prophylaxis [] Lovenox, []  Heparin, [] SCDs, [] Ambulation,  [] Eliquis, [] Xarelto  [] Coumadin   Code Status Full Code             Personally reviewed Lab Studies and Imaging     Discussed management of the case with case management who recommended PT OT    Rhythm strip independently interpreted by myself heart rate 102 QRS 0.09 no ST elevation    Drugs that require monitoring for toxicity include Lasix and the method of monitoring was creatinine to avoid HERMELINDA as toxicity    Medical Decision Making:  The following items were considered in medical decision making:  Discussion of patient care with other 
    V2.0    [facility] [place] Progress Note      Name:  Home Thorpe II /Age/Sex: 1955  (69 y.o. male)   MRN & CSN:  [id number] & [id number] Encounter Date/Time: 2025 11:36 AM EST   Location:  [id number]/5264-01 PCP: Mariya Daily MD     Attending:Blaze Whelan MD       Hospital Day: 2    Assessment and Recommendations   Home Thorpe II is a 69 y.o. male who presents with Shortness of breath      Plan:       Likely congestive heart failure with acute exacerbation, admitted to the hospital IV Lasix started cardiology consulted telemetry monitoring, CBC CMP in a.m.  Leukocytosis steroids related no signs of infection at this time  Obstructive sleep apnea CPAP noncompliant  Essential hypertension p.o. medications uncontrolled on presentation  Recently diagnosed with giant cell arteritis on prednisone vascular surgery consulted  Diffuse skin lesion with ulcerations will consult ID will check CRP      Diet ADULT DIET; Regular; No Added Salt (3-4 gm)   DVT Prophylaxis [] Lovenox, []  Heparin, [] SCDs, [] Ambulation,  [] Eliquis, [] Xarelto  [] Coumadin   Code Status Full Code             Personally reviewed Lab Studies and Imaging       Rhythm strip independently interpreted by myself no ST elevation heart rate 86        Drugs that require monitoring for toxicity include Lasix and the method of monitoring was creatinine to assure no HERMELINDA as toxicity    Medical Decision Making:  The following items were considered in medical decision making:  Discussion of patient care with other providers  Reviewed clinical lab tests  Reviewed radiology tests  Reviewed other diagnostic tests/interventions  Independent review of radiologic images  Microbiology cultures and other micro tests reviewed      Subjective:     Chief Complaint: Shortness of breath    Home Thorpe II is a 69 y.o. male who presents with shortness of breath leg edema no fever chills or muscle cramps      Review of Systems:      Pertinent 
4 Eyes Skin Assessment     NAME:  Home Thorpe II  YOB: 1955  MEDICAL RECORD NUMBER:  [id number]    The patient is being assessed for  Admission    I agree that at least one RN has performed a thorough Head to Toe Skin Assessment on the patient. ALL assessment sites listed below have been assessed.      Areas assessed by both nurses:    Head, Face, Ears, Shoulders, Back, Chest, Arms, Elbows, Hands, Sacrum. Buttock, Coccyx, Ischium, and Legs. Feet and Heels        Does the Patient have a Wound? No noted wound(s)       William Prevention initiated by RN: No  Wound Care Orders initiated by RN: No    Pressure Injury (Stage 3,4, Unstageable, DTI, NWPT, and Complex wounds) if present, place Wound referral order by RN under : No    New Ostomies, if present place, Ostomy referral order under : No     Nurse 1 eSignature: Electronically signed by Sophia Cook RN on 2/7/25 at 5:43 AM EST    **SHARE this note so that the co-signing nurse can place an eSignature**    Nurse 2 eSignature: Electronically signed by Christina Abdullahi RN on 2/7/25 at 5:48 AM EST    
Advance Care Planning     Advance Care Planning Inpatient Note  [facility] Department    Today's Date: 2/10/2025  Unit: [facility] 5N PROGRESSIVE CARE    Received request from Other: , saying pt will possibly go home 2/10 .  Upon review of chart and communication with care team, patient's decision making abilities are not in question.. Patient, Spouse, and Child/Children was/were present in the room during visit.    Goals of ACP Conversation:  Discuss advance care planning documents  Facilitate a discussion related to patient's goals of care as they align with the patient's values and beliefs.    Health Care Decision Makers:     Primary- Wife- Monica Thorpe- [phone]  Summary:  Completed New Documents    Interventions:  Provided education on documents for clarity and greater understanding  Discussed and provided education on state decision maker hierarchy    Care Preferences Communicated:   No    Outcomes/Plan:  ACP Discussion: Completed  New advance directive completed.  Returned original document(s) to patient, as well as copies for distribution to appointed agents  Copy of advance directive given to staff to scan into medical record.  Routed ACP note to attending provider or other IDT member.  No further visits planned    Electronically signed by TONY Whyte on 2/10/2025 at 5:33 PM           
Appt scheduled at [facility] per Dr. Whelan's instruction. Appt scheduled for tomorrow (2/11) at 1250P in [place]. Dr. Whelan aware and that pt will need to be discharged by 1130. [facility]  Electronically signed by KLEVER Serna on 2/10/25 at 1:55 PM EST    Pt appears to be a little more confused this afternoon. Attempted to call Wife and daughter to discuss discharge and appt tomorrow. No answer from either.  Electronically signed by KLEVER Serna on 2/10/25 at 3:17 PM EST    RN received return call from Wife Laura who stated do to a recent surgery she had and a previously scheduled appt she will be unable to take pt to [facility] appt. She stated she will call and find out their next available and return call to RN.  Electronically signed by KLEVER Serna on 2/10/25 at 3:25 PM EST    Appt rescheduled for Wed 02/12 @ 0820. Wife Laura aware. Perfect serve to Dr. Whelan.   Electronically signed by KLEVER Serna on 2/10/25 at 4:52 PM EST      Pt noted to have a large what appears to be a fluid filled sac on L outer ankle. LLE noted to be reddened from mid calf down. Pt states area is sensitive to touch. Perfect serve to Dr. Whelan.  Electronically signed by KLEVER Serna on 2/10/25 at 5:30 PM EST    Wife updated on wound care consult and ATB for L outer ankle.   Wife also requested that she be called if pt is discharged as pt has intermittent confusion.   Electronically signed by KLEVER Serna on 2/10/25 at 5:55 PM EST    Wife called RN and expressed concerns about being able to take pt home. While pt is apparently having a better day today he is still having intermittent confusion. Wife feels that between his \"off \" days and her recent surgery she doesn't feel its safe for him to come home. RN explained with his therapy scores he is likely not going to qualify for a SNF but could possibly get some home health. Wife encouraged to call back tomorrow and speak with case management.  Electronically signed by KLEVER Serna on 
Attempted to schedule patient's follow up appointment with [facility], but patient's wife states she may not be able to take patient to appointment because it needs to be on her and her daughter's schedule. Patient's wife states she has a follow up appointment arranged for him on 2/27. Advised them to try to get an earlier follow up. She stated between now and 2/27 she would attempt to get another appointment arranged.   
Blood sugar in the evening was 261.Patient is on Pedinisone. MD,Danika Hills notified for insulin order.Please see MAR.    Electronically signed by Maria T Chiu RN on 2/9/25 at 06:25 AM EST   
CLINICAL PHARMACY NOTE: MEDS TO BEDS    Total # of Prescriptions Filled: 3   The following medications were delivered to the patient:  Current Discharge Medication List        START taking these medications    Details   lisinopril (PRINIVIL;ZESTRIL) 5 MG tablet Take 1 tablet by mouth daily  Qty: 30 tablet, Refills: 0      doxycycline hyclate (VIBRA-TABS) 100 MG tablet Take 1 tablet by mouth every 12 hours for 5 days  Qty: 10 tablet, Refills: 0      furosemide (LASIX) 40 MG tablet Take 1 tablet by mouth daily  Qty: 30 tablet, Refills: 0               Additional Documentation:  Tubed to Aziza   
Call to [facility]-  Dr. Alejandro states that unless patient starts to have any vision changes or abnormal side effects, that the pt is ok to stay on 60 mg of prednisone daily until next appointment on 2/27, that the pt does not need to be seen any sooner.     Pt wife, assigned RN, and Attending MD all made aware of above.   
Clinical Pharmacy Note  Heparin Dosing       Lab Results   Component Value Date/Time    ANTIXAUHEP 0.20 02/07/2025 01:41 AM      Lab Results   Component Value Date/Time    HGB 9.8 02/06/2025 06:42 PM    HCT 32.6 02/06/2025 06:42 PM     02/06/2025 06:42 PM    INR 1.04 11/22/2024 09:29 AM       Current Infusion Rate: 1000 units/hr    Plan:  Bolus: 2000 units  Rate: increase to 1200 units/hr  Next anti-Xa level: 0900 2/7/25    Pharmacy will continue to monitor and adjust based on anti-Xa results.    Fabricio Miller, PharmD  
Clinical Pharmacy Note  Heparin Dosing       Lab Results   Component Value Date/Time    ANTIXAUHEP 0.37 02/07/2025 08:49 AM      Lab Results   Component Value Date/Time    HGB 9.8 02/06/2025 06:42 PM    HCT 32.6 02/06/2025 06:42 PM     02/06/2025 06:42 PM    INR 1.04 11/22/2024 09:29 AM       Current Infusion Rate: 1200 units/hr    Plan:  Rate: continue 1200 units/hr  Next anti-Xa level: 1500 2/7/25    Pharmacy will continue to monitor and adjust based on anti-Xa results.    Giuliana Carrasco RPH, PharmD 2/7/2025 9:45 AM    
Hospitalist Progress Note  2/8/2025 12:55 PM  Subjective:   Admit Date: 2/6/2025  PCP: Mariya Daily MD Status: Inpatient   Interval History: Hospital Day: 3, admitted with congestive hear failure initiated on furosemide 40 mg IV BID.  History of SABINO with intermittent use of CPAP.  Recently diagnosed with Giant Cell Arteritis on Prednisone 60 mg daily, followed by vascular surgery.     Diet: regular, 2-3 gm salt, 1500 ml fluid restriction  Left antecubital peripheral IV (2/6, day #3)  External urinary catheter (2/7, day #2)    Medications:     prednisone  60 mg Oral Daily   aspirin  81 mg Oral Daily   albuterol sulfate HFA  2 puff Inhalation BID RT   amlodipine  10 mg Oral Daily   atorvastatin  10 mg Oral Daily   pantoprazole  40 mg Oral QAM   budesonide-formoterol  2 puff Inhalation BID RT   cetirizine  10 mg Oral Daily   lisinopril  5 mg Oral Daily   furosemide  40 mg IntraVENous BID       Labs:     Recent Labs     02/06/25  1842 02/07/25  0425 02/08/25  0624   WBC 14.2*  --  12.2*   HGB 9.8*  --  9.5*     --  306   MCV 68.9*  --  67.4*    142 140   K 4.3 3.5 3.9    102 101   CO2 26 30 31   BUN 18 15 24*   CREATININE 0.9 0.8 0.9   GLUCOSE 129* 114* 158*   MG  --  2.29 2.40   CALCIUM 9.0 8.6 8.6   ALBUMIN 3.6 3.2*  --    AST 24 27  --    ALT 54* 46*  --    ALKPHOS 89 78  --    BILIDIR  --  <0.1  --      hsTnT (2/6) 19 / 19 ng/L  proBNP (2/6) 331 pg/mL  CRP (2/7) 19.4 mg/L  TSH (2/7) 2.41 uIU/mL  Fe / TIBC (2/7) 18 / 271 = 7% iron saturation    Rheumatoid factor (2/1) < 10 IU/mL    SARS-CoV-2 NAAT (2/6) not detected  Quantiferon(r) TB Gold  (2/6) negative    POC Glucose:   Recent Labs     02/07/25  0812 02/07/25  1153 02/07/25  2034 02/08/25  0301   POCGLU 100* 150* 301* 171*     PA/lat CXR (2/6)  No acute process. Large hiatal hernia.    Transthoracic echo (2/7) eft Ventricle: Normal left ventricular systolic function with a visually estimated EF of 50 - 55%. Left ventricle size is normal. 
Hospitalist Progress Note  2/9/2025 8:16 AM  Subjective:   Admit Date: 2/6/2025  PCP: Mariya Daily MD Status: Inpatient   Interval History: Hospital Day: 4, feels better and lower extremity edema improved.      History of present illness: Admitted with congestive hear failure initiated on furosemide 40 mg IV BID.  History of SABINO with intermittent use of CPAP.  Recently diagnosed with Giant Cell Arteritis on Prednisone 60 mg daily, followed by vascular surgery.      Diet: regular, 2-3 gm salt, 1500 ml fluid restriction  Left antecubital peripheral IV (2/6, day #4)  External urinary catheter (2/7, day #3)    Medications:     insulin lispro SSI  0-4 Units SubCUTAneous 4x Daily AC & HS   prednisone  60 mg Oral Daily   aspirin  81 mg Oral Daily   albuterol sulfate HFA  2 puff Inhalation BID RT   amlodipine  10 mg Oral Daily   atorvastatin  10 mg Oral Daily   pantoprazole  40 mg Oral QAM AC   budesonide-formoterol  2 puff Inhalation BID RT   lisinopril  5 mg Oral Daily   furosemide  40 mg IntraVENous BID       Labs:       02/06/25  1842 02/07/25  0425 02/08/25  0624   WBC 14.2*  --  12.2*   HGB 9.8*  --  9.5*     --  306   MCV 68.9*  --  67.4*          142 140   K 4.3 3.5 3.9    102 101   CO2 26 30 31   BUN 18 15 24*   CREATININE 0.9 0.8 0.9   GLUCOSE 129* 114* 158*         MG  --  2.29 2.40   CALCIUM 9.0 8.6 8.6   ALBUMIN 3.6 3.2*  --    AST 24 27  --    ALT 54* 46*  --    ALKPHOS 89 78  --    BILIDIR  --  <0.1  --      hsTnT (2/6) 19 / 19 ng/L  proBNP (2/6) 331 pg/mL  CRP (2/7) 19.4 mg/L  TSH (2/7) 2.41 uIU/mL  Fe / TIBC (2/7) 18 / 271 = 7% iron saturation     Rheumatoid factor (2/1) < 10 IU/mL     SARS-CoV-2 NAAT (2/6) not detected  Quantiferon(r) TB Gold  (2/6) negative    POC Glucose:   Recent Labs     02/07/25  1153 02/07/25  2034 02/08/25  0301 02/08/25 2021   POCGLU 150* 301* 171* 261*     PA/lat CXR (2/6)  No acute process. Large hiatal hernia.     Transthoracic echo (2/7) eft Ventricle: 
MD states patient is to be back on prednisone until VS and ID see him d/t his diagnosis. Pharmacy called and med rec was completed on admission. Pt is suppose to be on 60mg daily. His doses were to be tapered at home. Pharmacy states MD can taper doses when ready. Prednisone reorder at 60 mg daily.     Electronically signed by TANYA CHEN RN on 2/7/2025 at 2:09 PM    
Medication Reconciliation    List of medications patient is currently taking is complete.     Source of information: 1. Conversation with patient at bedside                                      2. EPIC records      Allergies  Azithromycin, Guaifenesin er, Erythromycin, and Vicodin [hydrocodone-acetaminophen]     Notes regarding home medications:   1. Patient received all of his morning home medications prior to arrival to the emergency department.    2. Patient is on steroid therapy taking predisone 20mg 3T QD. Patient states this was just titrated down today by his doctor.    Clare Stringer, Pharmacy Intern  2/6/2025 10:16 PM            
Occupational Therapy  Facility/Department: [address] PROGRESSIVE CARE  Occupational Therapy Treatment and Tentative D/C      Name: Home Thorpe II  : 1955  MRN: [id number]  Date of Service: 2025    Staff assist recommendation: [id number]      Discharge Recommendations: Home Thorpe II scored a 19/24 on the AM-PAC ADL Inpatient form. Current research shows that an AM-PAC score of 18 or greater is typically associated with a discharge to the patient's home setting. Based on the patient's AM-PAC score, and their current ADL deficits, it is recommended that the patient have 2-3 sessions per week of Occupational Therapy at d/c to increase the patient's independence.  At this time, this patient demonstrates the endurance and safety to discharge home with HHOT and a follow up treatment frequency of 2-3x/wk.   Please see assessment section for further patient specific details.    If patient discharges prior to next session this note will serve as a discharge summary.  Please see below for the latest assessment towards goals.     Home with assist PRN, 2-3 sessions per week  OT Equipment Recommendations  Equipment Needed: No       Patient Diagnosis(es): The primary encounter diagnosis was Acute congestive heart failure, unspecified heart failure type (HCC). Diagnoses of Abnormal EKG, Shortness of breath, and Goals of care, counseling/discussion were also pertinent to this visit.  Past Medical History:  has a past medical history of Acute congestive heart failure (HCC), Hyperlipidemia, Hypertension, Recurrent left inguinal hernia, S/P colonoscopy, Scrotal hernia, and Sleep apnea.  Past Surgical History:  has a past surgical history that includes Tonsillectomy; Inguinal hernia repair (2017); Colonoscopy; Endoscopy, colon, diagnostic; hernia repair (Left, 2017); Colonoscopy (N/A, 2023); and Temporal Artery Biopsy (Bilateral, 2024).           Assessment  Performance deficits / Impairments: 
PT states he cannot use Bipap or his Home CPAP unit.  Does not have a clear reason but states he will never use it again,  
Patient A/O x4, hyperverbal.  VSS on RA. Patient denies pain. Tolerating PO. Beginning of shift patient incontinent of urine and stool. Throughout shift, patient more continent and Purewick removed. Patient voiding clear yellow urine via urinal and ambulating to bathroom with CGA and RX x1. Sitting up in chair much of shift. BLE re-wrapped with Kerlix and Ace this shift. Patient tolerated well. Bed/chair alarm on with call light within reach. Care ongoing.   
Patient alert and oriented x 4, hyper verbal and up x 1 walker. Patient voiding via urinal and BRP. Patient tolerating diet with no c/o nausea or pain at this time. Initial assessment completed, see flowsheet. Patient's chair is locked with an active chair alarm. Non slip socks applied.    
Patient awaiting consult from orthopedic for red appearing fluid filled sac in outer left ankle. Patient was supposed to have appointment with [facility], 2/12/2025 at 8:20 AM. Call placed to [facility] to try to change appointment time to afternoon so patient can be seen by orthopedics. I office states that someone called and canceled appointment. This RN informed office that patient needs an appointment on day of discharge. I office states they will have nurse give us a call back. Currently awaiting call.    Electronically signed by KLEVER Ervin on 2/11/2025 at 4:39 PM       Nurse from [facility] never returned call. Call placed back to [facility] office, on call stated they put a note in chart and for a hospital nurse to call the office back in the morning. MD Whelan notified.     Electronically signed by Aziza ERNST on 2/11/2025 at 5:43 PM   
Patient discharging today. IV removed, no complications, dressing applied. Paperwork reviewed with patient including medications and follow up appointments. All questions answered. Patient discharging home with [facility] with all belongings. Meds delivered to bedside. Walker delivered to bedside. Patient cleared by orthopedics, infectious disease, and hospitalist to discharge home. Wife notified of patient's discharge.   
Physical Therapy  Facility/Department: [address] PROGRESSIVE CARE  Physical Therapy Initial Assessment    Name: Home Thorpe II  : 1955  MRN: [id number]  Date of Service: 2/10/2025    Discharge Recommendations:  Home with assist PRN   PT Equipment Recommendations  Equipment Needed: No      Patient Diagnosis(es): The primary encounter diagnosis was Acute congestive heart failure, unspecified heart failure type (HCC). Diagnoses of Abnormal EKG, Shortness of breath, and Goals of care, counseling/discussion were also pertinent to this visit.  Past Medical History:  has a past medical history of Acute congestive heart failure (HCC), Hyperlipidemia, Hypertension, Recurrent left inguinal hernia, S/P colonoscopy, Scrotal hernia, and Sleep apnea.  Past Surgical History:  has a past surgical history that includes Tonsillectomy; Inguinal hernia repair (2017); Colonoscopy; Endoscopy, colon, diagnostic; hernia repair (Left, 2017); Colonoscopy (N/A, 2023); and Temporal Artery Biopsy (Bilateral, 2024).    Assessment  Body Structures, Functions, Activity Limitations Requiring Skilled Therapeutic Intervention: Decreased functional mobility ;Decreased endurance;Decreased balance  Assessment: Pt is a 69 y.o. M. admitted  for (B) LE edema, giant cell arteritis, SABINO.  He presents pleasant and agreeable to evaluation, demonstrating functional LE strength, able to ambulate moderate distance in room with RW, no physical assist.  Completed self-care tasks with increased time, no physical assist.  He would benefit from continued therapy to maximize his strength, balance, endurance, independence ambulating.  Anticipate return home with prn assist.    Home Thorpe II scored a 18/24 on the AM-PAC short mobility form.     If patient discharges prior to next session this note will serve as a discharge summary.  Please see below for the latest assessment towards goals.      Treatment Diagnosis: Decreased 
Physical Therapy  Facility/Department: [address] PROGRESSIVE CARE  Daily Treatment Note  NAME: Home Thorpe II  : 1955  MRN: [id number]    Date of Service: 2025    Discharge Recommendations:  Home with assist PRN   PT Equipment Recommendations  Equipment Needed: No    Patient Diagnosis(es): The primary encounter diagnosis was Acute congestive heart failure, unspecified heart failure type (HCC). Diagnoses of Abnormal EKG, Shortness of breath, and Goals of care, counseling/discussion were also pertinent to this visit.    Assessment  Assessment: Pt able to complete self-care and mobility tasks with RW support, Supervision.  Pt would benefit from continued therapy in the acute setting to maximize strength, balance, endurance, and independence with mobility tasks.  Anticipate safe return home with prn assist.     Home Thorpe II scored a 18/24 on the AM-PAC short mobility form.     If patient discharges prior to next session this note will serve as a discharge summary.  Please see below for the latest assessment towards goals.        Activity Tolerance: Patient tolerated treatment well  Equipment Needed: No    Plan  Physical Therapy Plan  General Plan: 2-3 times per week  Current Treatment Recommendations: Functional mobility training;Transfer training;Neuromuscular re-education;Equipment evaluation, education, & procurement;Endurance training;Gait training;Stair training;Safety education & training;Patient/Caregiver education & training;Home exercise program;ADL/Self-care training;Therapeutic activities    Restrictions  Restrictions/Precautions  Restrictions/Precautions: Fall Risk     Subjective   Subjective  Subjective: Agreeable to activity.    Objective    Bed Mobility Training  Bed Mobility Training: No    Balance  Sitting: Intact  Standing: Intact  Standing - Static: Good  Standing - Dynamic: Good;Fair    Transfer Training  Transfer Training: Yes  Overall Level of Assistance: Supervision  Sit 
Pt transferred via stretcher from ED to room 5264. A&Ox4, All questions answered, oriented to the room and call light. VSS. He has no questions at this time.    Electronically signed by Sophia Cook RN on 2/7/2025  
(RW)  Standing - Static: Good  Standing - Dynamic: Good;Fair  Transfer Training  Transfer Training: Yes  Overall Level of Assistance: Supervision (RW)  Sit to Stand: Supervision (RW)  Stand to Sit: Supervision  Bed to Chair: Supervision (RW)  Toilet Transfer: Supervision (RW; grab bar)  Gait  Gait Training: Yes (RW; no overt LOB)  Overall Level of Assistance: Stand by assistance;Supervision  Distance (ft): 25 Feet (25ft x2; 150ft)  Assistive Device: Walker, rolling     AROM: Within functional limits  PROM: Within functional limits  Strength: Within functional limits  Coordination: Within functional limits  Tone: Normal  ADL  Grooming: Stand by assistance;Supervision (completed hand washing in stance at sink x2 attempts; SBA/supervision for balance)  LE Dressing: Minimal assistance (pt able to doff brief; pt needing assist to thread L LE, able to thread R LE; able to manage over hips; SBA/supervision for balance)  Toileting: Stand by assistance;Supervision (complete urination in stance and then BM seated; able to manage brief and complete pericare; SBA/supervision for balance)  Functional Mobility: Stand by assistance;Supervision (RW)  Additional Comments: Anticipate pt needing up to Min A for ADLs based on ROM, strength, and balance                 Cognition  Overall Cognitive Status: WFL  Orientation  Overall Orientation Status: Within Normal Limits                  Education Given To: Patient  Education Provided: Role of Therapy;Plan of Care;Transfer Training;ADL Adaptive Strategies  Education Method: Demonstration;Verbal  Barriers to Learning: None  Education Outcome: Verbalized understanding;Demonstrated understanding;Continued education needed          AM-PAC - ADL  AM-PAC Daily Activity - Inpatient   How much help is needed for putting on and taking off regular lower body clothing?: A Little  How much help is needed for bathing (which includes washing, rinsing, drying)?: A Little  How much help is needed for 
    RENAL:   Lab Results   Component Value Date    CREATININE 1.0 02/11/2025    BUN 29 (H) 02/11/2025     02/11/2025    K 3.6 02/11/2025    CL 98 (L) 02/11/2025    CO2 30 02/11/2025     SED RATE:   Lab Results   Component Value Date/Time    SEDRATE 74 02/01/2025 12:34 PM     CK: No results found for: \"CKTOTAL\"  CRP:   Lab Results   Component Value Date/Time    CRP 19.4 02/07/2025 04:25 AM     Hepatic Function Panel:   Lab Results   Component Value Date/Time    ALKPHOS 78 02/07/2025 04:25 AM    ALT 46 02/07/2025 04:25 AM    AST 27 02/07/2025 04:25 AM    BILITOT 0.3 02/07/2025 04:25 AM    BILIDIR <0.1 02/07/2025 04:25 AM    IBILI 0.2 02/07/2025 04:25 AM     UA:No results found for: \"COLORU\", \"CLARITYU\", \"GLUCOSEU\", \"BILIRUBINUR\", \"KETUA\", \"SPECGRAV\", \"BLOODU\", \"PHUR\", \"PROTEINU\", \"UROBILINOGEN\", \"NITRU\", \"LEUKOCYTESUR\", \"URINETYPE\"   Urine Microscopic: No results found for: \"LABCAST\", \"BACTERIA\", \"COMU\", \"HYALCAST\", \"WBCUA\", \"RBCUA\"  Urine Reflex to Culture: No results found for: \"URRFLXCULT\"    WBC   14.2    CRP 19.4     Esr 74         INAL DIAGNOSIS:        A. Left temporal artery, excision:        - Segment of artery with mild intimal thickening and focal internal          elastic lamina disruption- See Comment.        B. Right temporal artery, excision:        - Segment of artery with mild intimal thickening and focal internal          elastic lamina disruption- See Comment.      COMMENT:    No significant lymphohistiocytic, granulomatous inflammation   or giant cell reaction is present. Elastic stains (on both parts) reveal   focal internal elastic lamina disruption. The overall histologic findings   are non-specific. Of note, temporal arteritis involvement can be   segmental, and clinical treatment may alter histologic findings.   Correlation with clinical/ radiologic findings is requir     TTE :   2/6/25        Left Ventricle: Normal left ventricular systolic function with a visually estimated EF of 50 - 
contact the dictating provider for clarification.     
needed for taking care of personal grooming?: A Little  How much help for eating meals?: None  AM-PAC Inpatient Daily Activity Raw Score: 19  AM-PAC Inpatient ADL T-Scale Score : 40.22  ADL Inpatient CMS 0-100% Score: 42.8  ADL Inpatient CMS G-Code Modifier : CK    Tinneti Score       Goals  Short Term Goals  Time Frame for Short Term Goals: prior to D/C  Short Term Goal 1: complete functional mobility and transfers Mod Ind  Short Term Goal 2: complete bathing and dressing Mod Ind  Short Term Goal 3: complete toileting Mod Ind  Short Term Goal 4: complete grooming in stance at sink Mod Ind  Long Term Goals  Time Frame for Long Term Goals : STG=LTG  Patient Goals   Patient goals : return to PLOF      Therapy Time   Individual Concurrent Group Co-treatment   Time In 0703         Time Out 0726         Minutes 23         Timed Code Treatment Minutes: 8 Minutes (15 minute eval)       ROHAN Krishna/L     
IntraVENous BID       Continuous Infusions:   dextrose      sodium chloride         PRN Meds:  sulfur hexafluoride microspheres, glucose, dextrose bolus **OR** dextrose bolus, glucagon (rDNA), dextrose, sodium chloride flush, sodium chloride, ondansetron **OR** ondansetron, polyethylene glycol, acetaminophen **OR** acetaminophen, potassium chloride **OR** potassium alternative oral replacement **OR** potassium chloride, magnesium sulfate    Imaging:   XR CHEST (2 VW)   Final Result   1. No acute process.   2. Large hiatal hernia.         CT ANKLE LEFT WO CONTRAST    (Results Pending)       All pertinent images and reports for the current Hospitalization were reviewed by me.    IMPRESSION:    Patient Active Problem List   Diagnosis Code    Hypertension I10    Hyperlipidemia E78.5    Insomnia G47.00    Allergic rhinitis J30.9    Scrotal hernia K40.90    Recurrent inguinal hernia of left side without obstruction or gangrene K40.91    SABINO (obstructive sleep apnea) G47.33    Treatment-emergent central sleep apnea G47.39    Vision loss, bilateral H54.3    Giant cell arteritis (HCC) M31.6    Vision changes H53.9    Shortness of breath R06.02    Acute congestive heart failure (HCC) I50.9    Abnormal ECG R94.31    Localized edema R60.0    Morbid obesity due to excess calories E66.01    Prednisone adverse reaction T38.0X5A    Lower leg edema R60.0    Abnormal EKG R94.31        ICD-10-CM    1. Acute congestive heart failure, unspecified heart failure type (HCC)  I50.9       2. Abnormal EKG  R94.31       3. Shortness of breath  R06.02 Echo (TTE) complete (PRN contrast/bubble/strain/3D)     Echo (TTE) complete (PRN contrast/bubble/strain/3D)     CANCELED: Echo (TTE) complete (PRN contrast/bubble/strain/3D)     CANCELED: Echo (TTE) complete (PRN contrast/bubble/strain/3D)      4. Goals of care, counseling/discussion  Z71.89     Full code         Admitted with shortness of breath  Bilateral lower extremity edema  Transthoracic

## 2025-02-12 NOTE — PLAN OF CARE
Problem: Discharge Planning  Goal: Discharge to home or other facility with appropriate resources  2/10/2025 0235 by Maria T Chiu RN  Outcome: Progressing  Flowsheets (Taken 2/9/2025 2340)  Discharge to home or other facility with appropriate resources:   Identify barriers to discharge with patient and caregiver   Identify discharge learning needs (meds, wound care, etc)   Arrange for needed discharge resources and transportation as appropriate   Refer to discharge planning if patient needs post-hospital services based on physician order or complex needs related to functional status, cognitive ability or social support system  2/9/2025 1620 by Kay Michaud RN  Outcome: Progressing     Problem: Skin/Tissue Integrity  Goal: Skin integrity remains intact  Description: 1.  Monitor for areas of redness and/or skin breakdown  2.  Assess vascular access sites hourly  3.  Every 4-6 hours minimum:  Change oxygen saturation probe site  4.  Every 4-6 hours:  If on nasal continuous positive airway pressure, respiratory therapy assess nares and determine need for appliance change or resting period  2/10/2025 0235 by Maria T Chiu RN  Outcome: Progressing  Flowsheets (Taken 2/9/2025 2340)  Skin Integrity Remains Intact:   Monitor for areas of redness and/or skin breakdown   Assess vascular access sites hourly   Every 4-6 hours minimum: Change oxygen saturation probe site   Every 4-6 hours: If on nasal continuous positive airway pressure, respiratory therapy assesses nares and determine need for appliance change or resting period  2/9/2025 1620 by Kay Michaud RN  Outcome: Progressing     Problem: Respiratory - Adult  Goal: Achieves optimal ventilation and oxygenation  2/10/2025 0235 by Maria T Chiu RN  Outcome: Progressing  Flowsheets (Taken 2/9/2025 2340)  Achieves optimal ventilation and oxygenation:   Assess for changes in respiratory status   Assess for changes in mentation and behavior   Position to facilitate 
  Problem: Discharge Planning  Goal: Discharge to home or other facility with appropriate resources  2/10/2025 0926 by Daphne Narvaez RN  Outcome: Progressing  2/10/2025 0235 by Maria T Chiu RN  Outcome: Progressing  Flowsheets (Taken 2/9/2025 2340)  Discharge to home or other facility with appropriate resources:   Identify barriers to discharge with patient and caregiver   Identify discharge learning needs (meds, wound care, etc)   Arrange for needed discharge resources and transportation as appropriate   Refer to discharge planning if patient needs post-hospital services based on physician order or complex needs related to functional status, cognitive ability or social support system     Problem: Skin/Tissue Integrity  Goal: Skin integrity remains intact  Description: 1.  Monitor for areas of redness and/or skin breakdown  2.  Assess vascular access sites hourly  3.  Every 4-6 hours minimum:  Change oxygen saturation probe site  4.  Every 4-6 hours:  If on nasal continuous positive airway pressure, respiratory therapy assess nares and determine need for appliance change or resting period  2/10/2025 0926 by Daphne Narvaez RN  Outcome: Progressing  2/10/2025 0235 by Maria T Chiu RN  Outcome: Progressing  Flowsheets (Taken 2/9/2025 2340)  Skin Integrity Remains Intact:   Monitor for areas of redness and/or skin breakdown   Assess vascular access sites hourly   Every 4-6 hours minimum: Change oxygen saturation probe site   Every 4-6 hours: If on nasal continuous positive airway pressure, respiratory therapy assesses nares and determine need for appliance change or resting period     Problem: Respiratory - Adult  Goal: Achieves optimal ventilation and oxygenation  2/10/2025 0926 by Daphne Narvaez RN  Outcome: Progressing  2/10/2025 0235 by Maria T Chiu RN  Outcome: Progressing  Flowsheets (Taken 2/9/2025 2340)  Achieves optimal ventilation and oxygenation:   Assess for changes in respiratory status   Assess for 
  Problem: Discharge Planning  Goal: Discharge to home or other facility with appropriate resources  2/10/2025 2250 by Nirali Baker RN  Outcome: Progressing  Flowsheets (Taken 2/10/2025 2250)  Discharge to home or other facility with appropriate resources: Identify barriers to discharge with patient and caregiver     Problem: Skin/Tissue Integrity  Goal: Skin integrity remains intact  Description: 1.  Monitor for areas of redness and/or skin breakdown  2.  Assess vascular access sites hourly  3.  Every 4-6 hours minimum:  Change oxygen saturation probe site  4.  Every 4-6 hours:  If on nasal continuous positive airway pressure, respiratory therapy assess nares and determine need for appliance change or resting period  2/10/2025 2250 by Nirali Baker RN  Outcome: Progressing  Flowsheets (Taken 2/10/2025 0927 by Daphne Narvaez RN)  Skin Integrity Remains Intact: Monitor for areas of redness and/or skin breakdown    Problem: Cardiovascular - Adult  Goal: Maintains optimal cardiac output and hemodynamic stability  2/10/2025 2250 by Nirali Baker RN  Outcome: Progressing  Flowsheets (Taken 2/10/2025 2250)  Maintains optimal cardiac output and hemodynamic stability:   Monitor blood pressure and heart rate   Monitor urine output and notify Licensed Independent Practitioner for values outside of normal range     Problem: Cardiovascular - Adult  Goal: Absence of cardiac dysrhythmias or at baseline  2/10/2025 2250 by Nirali Baker RN  Outcome: Progressing  Flowsheets (Taken 2/10/2025 2250)  Absence of cardiac dysrhythmias or at baseline: Monitor cardiac rate and rhythm     Problem: Skin/Tissue Integrity - Adult  Goal: Skin integrity remains intact  Description: 1.  Monitor for areas of redness and/or skin breakdown  2.  Assess vascular access sites hourly  3.  Every 4-6 hours minimum:  Change oxygen saturation probe site  4.  Every 4-6 hours:  If on nasal continuous positive airway pressure, 
  Problem: Discharge Planning  Goal: Discharge to home or other facility with appropriate resources  2/11/2025 0851 by Aziza Low RN  Outcome: Progressing  2/10/2025 2250 by Nirali Baker RN  Outcome: Progressing  Flowsheets (Taken 2/10/2025 2250)  Discharge to home or other facility with appropriate resources: Identify barriers to discharge with patient and caregiver     Problem: Skin/Tissue Integrity  Goal: Skin integrity remains intact  Description: 1.  Monitor for areas of redness and/or skin breakdown  2.  Assess vascular access sites hourly  3.  Every 4-6 hours minimum:  Change oxygen saturation probe site  4.  Every 4-6 hours:  If on nasal continuous positive airway pressure, respiratory therapy assess nares and determine need for appliance change or resting period  2/11/2025 0851 by Aziza Low RN  Outcome: Progressing  2/10/2025 2250 by Nirali Baker RN  Outcome: Progressing  Flowsheets (Taken 2/10/2025 0927 by Daphne Narvaez RN)  Skin Integrity Remains Intact: Monitor for areas of redness and/or skin breakdown     Problem: Respiratory - Adult  Goal: Achieves optimal ventilation and oxygenation  Outcome: Progressing     Problem: Cardiovascular - Adult  Goal: Maintains optimal cardiac output and hemodynamic stability  2/11/2025 0851 by Aziza Low RN  Outcome: Progressing  2/10/2025 2250 by Nirali Baker RN  Outcome: Progressing  Flowsheets (Taken 2/10/2025 2250)  Maintains optimal cardiac output and hemodynamic stability:   Monitor blood pressure and heart rate   Monitor urine output and notify Licensed Independent Practitioner for values outside of normal range  Goal: Absence of cardiac dysrhythmias or at baseline  2/11/2025 0851 by Aziza Low RN  Outcome: Progressing  2/10/2025 2250 by Nirali Baker RN  Outcome: Progressing  Flowsheets (Taken 2/10/2025 2250)  Absence of cardiac dysrhythmias or at baseline: Monitor cardiac rate and rhythm     Problem: Skin/Tissue 
  Problem: Discharge Planning  Goal: Discharge to home or other facility with appropriate resources  2/12/2025 1158 by Aziza Low RN  Outcome: Progressing  2/11/2025 2228 by Janel Tomas RN  Outcome: Progressing  Flowsheets (Taken 2/11/2025 2000)  Discharge to home or other facility with appropriate resources:   Identify barriers to discharge with patient and caregiver   Identify discharge learning needs (meds, wound care, etc)     Problem: Skin/Tissue Integrity  Goal: Skin integrity remains intact  Description: 1.  Monitor for areas of redness and/or skin breakdown  2.  Assess vascular access sites hourly  3.  Every 4-6 hours minimum:  Change oxygen saturation probe site  4.  Every 4-6 hours:  If on nasal continuous positive airway pressure, respiratory therapy assess nares and determine need for appliance change or resting period  2/12/2025 1158 by Aziza Low RN  Outcome: Progressing  2/11/2025 2228 by Janel Tomas RN  Outcome: Progressing  Flowsheets (Taken 2/11/2025 2000)  Skin Integrity Remains Intact: Monitor for areas of redness and/or skin breakdown     Problem: Respiratory - Adult  Goal: Achieves optimal ventilation and oxygenation  2/12/2025 1158 by Aziza Low RN  Outcome: Progressing  2/11/2025 2228 by Janel Tomas RN  Outcome: Progressing     Problem: Cardiovascular - Adult  Goal: Maintains optimal cardiac output and hemodynamic stability  2/12/2025 1158 by Aziza Low RN  Outcome: Progressing  2/11/2025 2228 by Janel Tomas RN  Outcome: Progressing  Goal: Absence of cardiac dysrhythmias or at baseline  2/12/2025 1158 by Aziza Low RN  Outcome: Progressing  2/11/2025 2228 by Janel Tomas RN  Outcome: Progressing     Problem: Skin/Tissue Integrity - Adult  Goal: Skin integrity remains intact  Description: 1.  Monitor for areas of redness and/or skin breakdown  2.  Assess vascular access sites hourly  3.  Every 4-6 hours 
  Problem: Discharge Planning  Goal: Discharge to home or other facility with appropriate resources  2/7/2025 1108 by Katerine Poon RN  Outcome: Progressing  2/7/2025 1107 by Katerine Poon RN  Outcome: Progressing  2/7/2025 0312 by Sophia Cook RN  Outcome: Progressing     Problem: Skin/Tissue Integrity  Goal: Skin integrity remains intact  Description: 1.  Monitor for areas of redness and/or skin breakdown  2.  Assess vascular access sites hourly  3.  Every 4-6 hours minimum:  Change oxygen saturation probe site  4.  Every 4-6 hours:  If on nasal continuous positive airway pressure, respiratory therapy assess nares and determine need for appliance change or resting period  2/7/2025 1108 by Katerine Poon RN  Outcome: Progressing  2/7/2025 1107 by Katernie Poon RN  Outcome: Progressing  2/7/2025 0312 by Sophia Cook RN  Outcome: Progressing     Problem: Respiratory - Adult  Goal: Achieves optimal ventilation and oxygenation  Outcome: Progressing     Problem: Cardiovascular - Adult  Goal: Maintains optimal cardiac output and hemodynamic stability  Outcome: Progressing  Goal: Absence of cardiac dysrhythmias or at baseline  Outcome: Progressing     Problem: Skin/Tissue Integrity - Adult  Goal: Skin integrity remains intact  Description: 1.  Monitor for areas of redness and/or skin breakdown  2.  Assess vascular access sites hourly  3.  Every 4-6 hours minimum:  Change oxygen saturation probe site  4.  Every 4-6 hours:  If on nasal continuous positive airway pressure, respiratory therapy assess nares and determine need for appliance change or resting period  2/7/2025 1108 by Katerine Poon RN  Outcome: Progressing  2/7/2025 1107 by Katerine Poon RN  Outcome: Progressing  2/7/2025 0312 by Sophia Cook RN  Outcome: Progressing  Goal: Incisions, wounds, or drain sites healing without S/S of infection  Outcome: Progressing     Problem: Musculoskeletal - Adult  Goal: Return 
  Problem: Discharge Planning  Goal: Discharge to home or other facility with appropriate resources  2/8/2025 1550 by Kay Michaud RN  Outcome: Progressing     Problem: Skin/Tissue Integrity  Goal: Skin integrity remains intact  Description: 1.  Monitor for areas of redness and/or skin breakdown  2.  Assess vascular access sites hourly  3.  Every 4-6 hours minimum:  Change oxygen saturation probe site  4.  Every 4-6 hours:  If on nasal continuous positive airway pressure, respiratory therapy assess nares and determine need for appliance change or resting period  2/8/2025 1550 by Kay Michaud RN  Outcome: Progressing     Problem: Respiratory - Adult  Goal: Achieves optimal ventilation and oxygenation  2/8/2025 1550 by Kay Michaud RN  Outcome: Progressing     Problem: Cardiovascular - Adult  Goal: Maintains optimal cardiac output and hemodynamic stability  2/8/2025 1550 by Kay Michaud RN  Outcome: Progressing     Problem: Cardiovascular - Adult  Goal: Absence of cardiac dysrhythmias or at baseline  2/8/2025 1550 by Kay Michaud RN  Outcome: Progressing     Problem: Skin/Tissue Integrity - Adult  Goal: Skin integrity remains intact  Description: 1.  Monitor for areas of redness and/or skin breakdown  2.  Assess vascular access sites hourly  3.  Every 4-6 hours minimum:  Change oxygen saturation probe site  4.  Every 4-6 hours:  If on nasal continuous positive airway pressure, respiratory therapy assess nares and determine need for appliance change or resting period  2/8/2025 1550 by Kay Michaud RN  Outcome: Progressing     Problem: Skin/Tissue Integrity - Adult  Goal: Incisions, wounds, or drain sites healing without S/S of infection  2/8/2025 1550 by Kay Michaud RN  Outcome: Progressing     Problem: Musculoskeletal - Adult  Goal: Return mobility to safest level of function  2/8/2025 1550 by Kay Michaud RN  Outcome: Progressing     Problem: Musculoskeletal - Adult  Goal: Return ADL status 
  Problem: Discharge Planning  Goal: Discharge to home or other facility with appropriate resources  2/9/2025 0235 by Maria T Chiu RN  Outcome: Progressing  2/9/2025 0234 by Maria T Chiu RN  Outcome: Progressing  Flowsheets (Taken 2/8/2025 2116)  Discharge to home or other facility with appropriate resources:   Identify barriers to discharge with patient and caregiver   Arrange for needed discharge resources and transportation as appropriate   Identify discharge learning needs (meds, wound care, etc)   Refer to discharge planning if patient needs post-hospital services based on physician order or complex needs related to functional status, cognitive ability or social support system  2/8/2025 1550 by Kay Michaud RN  Outcome: Progressing     Problem: Skin/Tissue Integrity  Goal: Skin integrity remains intact  Description: 1.  Monitor for areas of redness and/or skin breakdown  2.  Assess vascular access sites hourly  3.  Every 4-6 hours minimum:  Change oxygen saturation probe site  4.  Every 4-6 hours:  If on nasal continuous positive airway pressure, respiratory therapy assess nares and determine need for appliance change or resting period  2/9/2025 0235 by Maria T Chiu RN  Outcome: Progressing  2/9/2025 0234 by Maria T Chiu RN  Outcome: Progressing  Flowsheets (Taken 2/8/2025 2116)  Skin Integrity Remains Intact:   Monitor for areas of redness and/or skin breakdown   Assess vascular access sites hourly   Every 4-6 hours minimum: Change oxygen saturation probe site   Every 4-6 hours: If on nasal continuous positive airway pressure, respiratory therapy assesses nares and determine need for appliance change or resting period  2/8/2025 1550 by Kay Michaud RN  Outcome: Progressing     Problem: Respiratory - Adult  Goal: Achieves optimal ventilation and oxygenation  2/9/2025 0235 by Maria T Chiu RN  Outcome: Progressing  2/9/2025 0234 by Maria T Chiu RN  Outcome: Progressing  Flowsheets (Taken 2/8/2025 
  Problem: Discharge Planning  Goal: Discharge to home or other facility with appropriate resources  2/9/2025 1620 by Kay Michaud RN  Outcome: Progressing     Problem: Skin/Tissue Integrity  Goal: Skin integrity remains intact  Description: 1.  Monitor for areas of redness and/or skin breakdown  2.  Assess vascular access sites hourly  3.  Every 4-6 hours minimum:  Change oxygen saturation probe site  4.  Every 4-6 hours:  If on nasal continuous positive airway pressure, respiratory therapy assess nares and determine need for appliance change or resting period  2/9/2025 1620 by Kay Michaud RN  Outcome: Progressing     Problem: Respiratory - Adult  Goal: Achieves optimal ventilation and oxygenation  2/9/2025 1620 by Kay Michaud RN  Outcome: Progressing     Problem: Cardiovascular - Adult  Goal: Maintains optimal cardiac output and hemodynamic stability  2/9/2025 1620 by Kay Michaud RN  Outcome: Progressing     Problem: Cardiovascular - Adult  Goal: Absence of cardiac dysrhythmias or at baseline  2/9/2025 1620 by Kay Michaud RN  Outcome: Progressing     Problem: Skin/Tissue Integrity - Adult  Goal: Skin integrity remains intact  Description: 1.  Monitor for areas of redness and/or skin breakdown  2.  Assess vascular access sites hourly  3.  Every 4-6 hours minimum:  Change oxygen saturation probe site  4.  Every 4-6 hours:  If on nasal continuous positive airway pressure, respiratory therapy assess nares and determine need for appliance change or resting period  2/9/2025 1620 by Kay Michaud RN  Outcome: Progressing     Problem: Skin/Tissue Integrity - Adult  Goal: Incisions, wounds, or drain sites healing without S/S of infection  2/9/2025 1620 by Kay Michaud RN  Outcome: Progressing     Problem: Musculoskeletal - Adult  Goal: Return mobility to safest level of function  2/9/2025 1620 by Kay Michaud RN  Outcome: Progressing     Problem: Musculoskeletal - Adult  Goal: Return ADL status 
  Problem: Discharge Planning  Goal: Discharge to home or other facility with appropriate resources  Outcome: Progressing     Problem: Skin/Tissue Integrity  Goal: Skin integrity remains intact  Description: 1.  Monitor for areas of redness and/or skin breakdown  2.  Assess vascular access sites hourly  3.  Every 4-6 hours minimum:  Change oxygen saturation probe site  4.  Every 4-6 hours:  If on nasal continuous positive airway pressure, respiratory therapy assess nares and determine need for appliance change or resting period  Outcome: Progressing     
improved  Recent Flowsheet Documentation  Taken 2/11/2025 2000 by Janel Tomas, RN  Care Plan - Patient's Chronic Conditions and Co-Morbidity Symptoms are Monitored and Maintained or Improved: Monitor and assess patient's chronic conditions and comorbid symptoms for stability, deterioration, or improvement  2/11/2025 0851 by Aziza Low, RN  Outcome: Progressing     
replacement as ordered   Monitor response to electrolyte replacements, including repeat lab results as appropriate   Fluid restriction as ordered   Instruct patient on fluid and nutrition restrictions as appropriate     Problem: Safety - Adult  Goal: Free from fall injury  Outcome: Progressing  Flowsheets (Taken 2/8/2025 0437)  Free From Fall Injury:   Instruct family/caregiver on patient safety   Based on caregiver fall risk screen, instruct family/caregiver to ask for assistance with transferring infant if caregiver noted to have fall risk factors     Problem: Chronic Conditions and Co-morbidities  Goal: Patient's chronic conditions and co-morbidity symptoms are monitored and maintained or improved  Outcome: Progressing  Flowsheets (Taken 2/7/2025 5825)  Care Plan - Patient's Chronic Conditions and Co-Morbidity Symptoms are Monitored and Maintained or Improved:   Monitor and assess patient's chronic conditions and comorbid symptoms for stability, deterioration, or improvement   Collaborate with multidisciplinary team to address chronic and comorbid conditions and prevent exacerbation or deterioration   Update acute care plan with appropriate goals if chronic or comorbid symptoms are exacerbated and prevent overall improvement and discharge

## 2025-02-12 NOTE — CARE COORDINATION
Case Management Discharge Note          Date / Time of Note: 2/12/2025 11:53 AM                  Patient Name: Home Thorpe II   YOB: 1955  Diagnosis: Shortness of breath [R06.02]  Abnormal EKG [R94.31]  Goals of care, counseling/discussion [Z71.89]  Acute congestive heart failure, unspecified heart failure type (HCC) [I50.9]   Date / Time: 2/6/2025  5:49 PM    Financial:  Payor: [facility] MEDICARE / Plan: [facility] MEDICARE ADVANTAGE / Product Type: *No Product type* /      Pharmacy:    [id number] - [place], [place] - [address] - P [phone] - F [phone]  [address]  [facility] [address]  Phone: [phone] Fax: [phone]      Assistance purchasing medications?: Potential Assistance Purchasing Medications: No  Assistance provided by Case Management: None at this time    DISCHARGE Disposition: Home with Home Health Care    Home Care:  Home Care ordered at discharge: Yes  Home Care Agency:   Discharging to Facility/ Agency   Name: [facility]  Address:  [address] [place] [address]  Phone:  [phone]  Fax:  [phone]   Orders faxed: No ([facility] orders from [facility])    Durable Medical Equipment:  DME Provider: [employer]  Equipment obtained during hospitalization: walker      Referrals made at DISCHARGE for outpatient continued care:  [facility], Referral to [facility]    Transportation:  Transportation PLAN for discharge: EMS transportation   Mode of Transport: Ambulance stretcher - BLS  Reason for medical transport: Severe muscular weakness and de-conditioned state due to Shortness of breath and Left Ankle Pain and requires ambulance transport due to Limited endurance, high fall risk, and help navigating steps  Name of Transport Company: [employer]  Phone: [phone]  Time of Transport: 5PM    Transport form completed: Yes    IMM Completed:   Yes, Case management has presented and reviewed IMM letter #2.       IMM

## 2025-02-12 NOTE — CONSULTS
Clinical Pharmacy Note  Heparin Dosing Consult    Home Thorpe II is a 69 y.o. male ordered heparin per CAD/STEMI/NSTEMI/UA/AFIB nomogram by Salvador Plunkett.     No results found for: \"ANTIXAUHEP\", \"LABHEPA\"   Lab Results   Component Value Date/Time    HGB 10.4 02/01/2025 12:34 PM    HCT 34.6 02/01/2025 12:34 PM     02/01/2025 12:34 PM    INR 1.04 11/22/2024 09:29 AM       Ht Readings from Last 1 Encounters:   02/06/25 1.651 m (5' 5\")        Wt Readings from Last 1 Encounters:   02/06/25 100.9 kg (222 lb 7.1 oz)        Assessment/Plan:  Initial bolus: 4000 units  Initial infusion rate: 1000 units/hr  Next anti-Xa:: 02/07/25 0200    Pharmacy will continue to monitor adjust heparin based on anti-Xa results using nomogram below:     CAD/STEMI/NSTEMI/UA/AFIB Heparin Nomogram     Initial Bolus: 60 units/kg Max Bolus: 4,000 units       Initial Rate: 12 units/kg/hr Max Initial Rate: 1,000 units/hr     anti-Xa Bolus Titration   < 0.1 Heparin 60 units/kg bolus Increase drip by 4 units/kg/hr   0.1 - 0.29 Heparin 30 units/kg bolus Increase drip by 2 units/kg/hr   0.3 - 0.7 No Bolus No Change   0.71 - 0.8 No Bolus Decrease drip by 1 units/kg/hr   0.81 - 0.99 No Bolus Decrease drip by 2 units/kg/hr   > 1 Hold Heparin for 1 hour Decrease drip by 3 units/kg/hr     Obtain anti-Xa 6 hours after initial bolus and 6 hours after any dose change until two consecutive therapeutic anti-Xa levels are achieved - then daily.     Tamela An, PharmD  2/6/2025 7:25 PM   
Consult noted/deferred. Pt was advised to come to the ER after his wife called the pcp to report high blood pressure, high blood sugar, and swelling. Cardiology signed off stating that he is not in chf.  There is a hospital follow up appointment scheduled with his primary care office next week.  
[facility]  Consultation Note    2/7/2025 1:20 PM    Chief Complaint: BLE Swelling on Admission     Reason for Consultation: GCA    History of Present Illness:  Patient is a 69 y.o. male who presented to the ED on 2/6/2025 with complaints of BLE Swelling. He has a significant PMH of HLD, HTN, Sleep Apnea, and Bilateral Temporal Artery Biopsy. The patient tells me he was called by his PCP office after having labs drawn and was instructed to present to the ED. He also reports worsening BLE Swelling recently. The patient underwent Bilateral Temporal Artery Biopsies in November 2024 with Dr. Griffin. These biopsies came back as indeterminate for GCA at that time. The patient tells me he has been on steroids since that time which has been managed by his Ophthalmologist at [facility]. He tells me his vision has improved somewhat over this time but has still not returned completely. We are consulted to evaluate the patient for GCA. At present, he is seen resting in bed, he is alert and oriented and appears to be in stable condition.     Review of Systems  Review of Systems   Constitutional: Negative.    HENT: Negative.     Eyes:  Positive for visual disturbance (Chronic vision changes over the past couple months).   Cardiovascular:  Positive for leg swelling.   Gastrointestinal: Negative.    Musculoskeletal: Negative.    Skin: Negative.    Neurological: Negative.    Hematological: Negative.    Psychiatric/Behavioral: Negative.          Past Medical History:   Diagnosis Date    Hyperlipidemia     Hypertension     Recurrent left inguinal hernia     S/P colonoscopy 08/01/2008    Scrotal hernia     Sleep apnea     uses CPAP       Past Surgical History:   Procedure Laterality Date    COLONOSCOPY      COLONOSCOPY N/A 5/22/2023    COLONOSCOPY POLYPECTOMY SNARE/COLD BIOPSY performed by Kane Ji MD at [facility] ENDOSCOPY    ENDOSCOPY, COLON, DIAGNOSTIC      HERNIA REPAIR Left 09/27/2017    ATTEMPTED 
[facility]  Consult Note    This patient is seen in consultation at the request of Dr Whelan    Reason for Consult:  left ankle lump    CHIEF COMPLAINT:  left ankle swelling    History Obtained From:  patient, electronic medical record    HISTORY OF PRESENT ILLNESS:    The patient is a 69 y.o. male who presents with left lateral ankle swelling for \"about 6 weeks\" States able to walk in halls but unable to wear shoe due to swelling. . Pain is described in left lateral ankle and with the intensity of mild. Pain is described as tender. Discomfort is intermittent. He is alert and oriented. No other complaints.     Past Medical History:        Diagnosis Date    Acute congestive heart failure (HCC) 2025    Hyperlipidemia     Hypertension     Recurrent left inguinal hernia     S/P colonoscopy 2008    Scrotal hernia     Sleep apnea     uses CPAP       Past Surgical History:        Procedure Laterality Date    COLONOSCOPY      COLONOSCOPY N/A 2023    COLONOSCOPY POLYPECTOMY SNARE/COLD BIOPSY performed by Kane Ji MD at [facility] ENDOSCOPY    ENDOSCOPY, COLON, DIAGNOSTIC      HERNIA REPAIR Left 2017    ATTEMPTED DAVINCI, CONVERTED OPEN RECURRENT LEFT INGUINAL hernia repair with mesh    INGUINAL HERNIA REPAIR  2017    DAVINCI LEFT INGUINAL HERNIA REPAIR WITH MESH    TEMPORAL ARTERY BIOPSY Bilateral 2024    BILATERAL TEMPORAL ARTERY BIOPSY performed by Ilya Griffin MD at [facility] OR    TONSILLECTOMY         Social History     Tobacco Use    Smoking status: Former     Types: Cigars     Start date:      Quit date:      Years since quittin.1     Passive exposure: Past    Smokeless tobacco: Never   Substance Use Topics    Alcohol use: No       Family History   Problem Relation Age of Onset    Cancer Mother     High Blood Pressure Mother     High Blood Pressure Sister     High Cholesterol Sister            Current Medications:   Current Facility-Administered 
Chayito Navarro MD   40 mg at 02/07/25 0913       Physical Exam:   BP (!) 154/90   Pulse (!) 103   Temp 98.3 °F (36.8 °C) (Oral)   Resp 16   Ht 1.651 m (5' 5\")   Wt 99.8 kg (220 lb)   SpO2 97%   BMI 36.61 kg/m²     Intake/Output Summary (Last 24 hours) at 2/7/2025 1149  Last data filed at 2/7/2025 0906  Gross per 24 hour   Intake 600 ml   Output 2500 ml   Net -1900 ml     Wt Readings from Last 2 Encounters:   02/07/25 99.8 kg (220 lb)   12/13/24 97.5 kg (215 lb)     Constitutional: He is oriented to person, place, and time. He appears well-developed and well-nourished. In no acute distress.   Head: Normocephalic and atraumatic.     Neck: Neck supple. No JVD present. Carotid bruit is not present. No mass and no thyromegaly present. No lymphadenopathy present.  Cardiovascular: Normal rate, regular rhythm, normal heart sounds and intact distal pulses.  Exam reveals no gallop and no friction rub.  No murmur heard.  Pulmonary/Chest: Effort normal and breath sounds normal. No respiratory distress. He has no wheezes, rhonchi or rales.   Abdominal: Soft, non-tender. Bowel sounds and aorta are normal. He exhibits no organomegaly, mass or bruit.   Extremities: No edema, cyanosis, or clubbing. Pulses are 2+ radial/carotid/dorsalis pedis and posterior tibial bilaterally.  Neurological: He is alert and oriented to person, place, and time. He has normal reflexes. No cranial nerve deficit. Coordination normal.   Skin: Skin is warm and dry. There is no rash or diaphoresis.   Psychiatric: He has a normal mood and affect. His speech is normal and behavior is normal.     Personally reviewed and interpreted   EKG Interpretation: Normal sinus rhythm wqith LVH and nonspecific ST changes    Lab Review:   Lab Results   Component Value Date/Time    TRIG 157 02/07/2025 04:25 AM    HDL 62 02/07/2025 04:25 AM    HDL 32 05/22/2012 10:22 AM    LDLDIRECT 106 10/22/2015 04:11 PM     Lab Results   Component Value Date/Time     
steroids  Chest x-ray with a large hiatal hernia        Suspect skin lesions are secondary to skin trauma itching with anxiety also secondary to being off steroids discussed skin tear    If he  were to continue high-dose of prednisone risk for complications  may need prophylaxis for PJP    At this time patient tells me that there is a plan to wean down on steroids which would help him with weight loss    Labs, Microbiology, Radiology and pertinent results from current hospitalization and care every where were reviewed by me as a part of the consultation.    PLAN :  No antibiotics indicated  Weight gain fluid retention secondary to high-dose steroids  Watch for complications  May need to discuss with [facility] in order to start weaning prednisone  CRP modestly elevated  ESR of 74  If patient remains on high-dose of prednisone for more than 3 months will likely need PJP prophylaxis -can consider single strength Bactrim versus Atovaquone  DC plans per primary team  Will sign off    Discussed with patient/Family and Nursing     Medical Decision Making:  The following items were considered in medical decision making:  Discussion of patient care with other providers  Reviewed clinical lab tests  Reviewed radiology tests  Reviewed other diagnostic tests/interventions  Independent review of radiologic images  Independent review of  Microbiology cultures and other micro tests reviewed       Risk of Complications/Morbidity: High      Illness(es)/ Infection present that pose threat to bodily function.   There is potential for severe exacerbation of infection/side effects of treatment.  Therapy requires intensive monitoring for antimicrobial agent toxicity.  Review of Antibiotic resistance patterns and lab results  Management decisions including changes in Antimicrobial therapy and infection control strategies  Coordination with Micro lab, public health agencies or interdisciplinary teams      Thanks for allowing me to

## 2025-02-27 NOTE — ED PROVIDER NOTES
[facility] EMERGENCY DEPARTMENT      EMERGENCY MEDICINE     Pt Name: Home Thorpe II  MRN: [id number]  Birthdate 1955  Date of evaluation: 2/27/2025  Provider: Roshan Mejias DO    CHIEF COMPLAINT     No chief complaint on file.    HISTORY OF PRESENT ILLNESS   Home Thorpe II is a 69 y.o. male who presents to the emergency department for cardiac arrest.  EMS was called to the scene after the patient had attempted to get up from his chair and reportedly fell to the ground.  When they arrived they stated that he was profoundly short of breath but they were talking to them.  They stated that as they were wheeling him around the corner that the patient became unresponsive and CPR was initiated.  Initial compressions were started at approximately 5:15 AM.  Initial rhythm showed asystole.  After 2 rounds of epinephrine the patient was noted to be in ventricular fibrillation and was defibrillated by EMS with the resulting rhythm asystole.  He arrived with a an Igel in place as well as a peripheral IV in his left AC.  He got a total of 3 rounds of epinephrine and no further medications from EMS.    Upon arrival to the emergency department the patient was unresponsive and in extremis.  He was placed on the Michele and given epinephrine as well as an amp of bicarbonate.  His Igel was changed out to a definitive airway with an ET tube by respiratory therapy with my supervision directly with tube passing the cords and good color change.    He received a total of 4 doses of epinephrine during the code and had 2 brief moments of less than 30 seconds of ROSC before he became bradycardic and again lost pulses.  He was also given 2 g of magnesium as well as 300 mg of amiodarone and 2 A of bicarbonate.  He got 1 single dose of 30 mcg of push dose epinephrine during his brief period of ROSC.    After approximately 45 minutes of compressions further resuscitative measures were  Multilevel thoracic spine degenerative changes. OTHER:  None IMPRESSION:  1.  No aortic aneurysm or evidence of aortitis/giant cell arteritis. 2.  Moderate sized hiatal hernia. 3.  Gallstones. SIGNED BY: Donnell Lew on 2/20/2025  7:13 .1 [phone]   [facility] Imaging Report - Main Call Center [phone] -  [facility] Call Center: [phone]       No results found.     LABS: (none if blank)  Labs Reviewed   POCT GLUCOSE - Abnormal; Notable for the following components:       Result Value    POC Glucose 279 (*)     All other components within normal limits       (Any cultures that may have been sent were not resulted at the time of this patient visit)    MEDICAL DECISION MAKING / ED COURSE:     External Documentation Reviewed: Previous patient encounter documents & history available on EMR was reviewed:   Record from: .    Decision Rules/Clinical Scores utilized:               See Formal Diagnostic Results above for the lab and radiology tests and orders.    ED Reassessment:  See ED course         Is this patient to be included in the SEP-1 core measure? No Exclusion criteria - the patient is NOT to be included for SEP-1 Core Measure due to: Infection is not suspected     MDM Summary:  History was obtained from: EMS    MDM    See course above    Consults (none if blank):          Social Determinants : None      Vitals Reviewed:    Vitals:    02/27/25 0558   BP: (!) 0/0   Pulse: (!) 0   Resp: (!) 0   SpO2: (!) 0%       The patient was seen and examined.The results of pertinent diagnostic studies and exam findings were discussed. The patient’s provisional diagnosis and plan of care were discussed with the patient (parents) who expressed a complete understanding. Any medications were reviewed and indications and risks of medications were discussed with the patient (parents).    Strict verbal and written return precautions, instructions and recommendation for appropriate follow-up were provided as

## 2025-02-27 NOTE — ED NOTES
Spouse now at bedside. She advised they would like pt to go to:    [facility] in [place].   [facility] address is [address], [place], [address].  [phone].

## 2025-02-27 NOTE — ED NOTES
This RN took that wife back to the room.  She was with him at the house and called the squad.  She wants [facility]  home.  She wanted [facility] called because he had an appointment this Am at the house.  They were called and update, wife left the building

## 2025-02-27 NOTE — TELEPHONE ENCOUNTER
Wife Laura wanted to inform us that Home passed away early the morning 2/27/2025. She wanted to make sure Doctor Guanako was notified because they were good friends.

## 2025-02-27 NOTE — PROGRESS NOTES
[facility]    Respiratory Care   Intubation Assessment        Name:  Home Thorpe II  Medical Record Number:  [id number]  Age: 69 y.o.   Gender: male  : 1955  Today's Date:  2025  Room:  [address] [place] [address]      Assessment        Patient Admission Diagnosis        Allergies  Allergies   Allergen Reactions    Azithromycin Other (See Comments) and Nausea Only     Closes up sinuses  Other reaction(s): Other (See Comments)  Closes up sinuses  Closes up sinuses      Guaifenesin Er Other (See Comments)     \"made my cough worse and fluid built up in my lungs\"    Erythromycin Nausea And Vomiting    Vicodin [Hydrocodone-Acetaminophen] Nausea And Vomiting        There were no vitals taken for this visit.    Patient Active Problem List   Diagnosis    Hypertension    Hyperlipidemia    Insomnia    Allergic rhinitis    Scrotal hernia    Recurrent inguinal hernia of left side without obstruction or gangrene    SABINO (obstructive sleep apnea)    Treatment-emergent central sleep apnea    Vision loss, bilateral    Giant cell arteritis (HCC)    Vision changes    Shortness of breath    Acute congestive heart failure (HCC)    Abnormal ECG    Localized edema    Morbid obesity due to excess calories    Prednisone adverse reaction    Lower leg edema    Abnormal EKG         Social History    Social History     Tobacco Use    Smoking status: Former     Types: Cigars     Start date:      Quit date:      Years since quittin.1     Passive exposure: Past    Smokeless tobacco: Never   Vaping Use    Vaping status: Never Used   Substance Use Topics    Alcohol use: No    Drug use: No       Patient was intubated for Respiratory Failure by DAISY Gutierrez RRT without difficulties  A 7.5 Endotracheal tube was visualized passing thru the vocal cords via glidescope and was secured at 27 cm at the Lip line. The ET tube was placed on the 1st attempt.  Tube placement was confirmed by CO2 detector and bilateral breath sounds  A

## 2025-02-27 NOTE — ED NOTES
This RN made contact with Sienna at [facility] Coroner's office at [phone]. Information provided and they advised that this will NOT be a 's case.

## 2025-02-27 NOTE — ED NOTES
Code Narration     Present: HUDSON Mejias MD    RN's: Ghassan DRISCOLL, Venus Winkler, Uzma TREVIÑO    ED Tech: Dany Elliott    RT: Theo    0540 arrival, CPR in progress (EMS reports last pulse check at 0535)    0541: Pulse check, no pulse    0542:40: Epi and Bicarb administered    0543:05: resume CPR; Blood glucose 279 mg/dL    0545:05 Pulse Check, no pulse    0545:32: resume CPR    0545:50 Epi administered  0546:50 Bicarb administered    0547:32: Pulse check, no pulse    0547:45: resume CPR     0548:20: Epi administered    0549:45: Pulse Check  0550: ROSC    0551:50: 300mg Amiodarone and 1g Magnesium administered    0552: No pulse, resume compressions    0554:05: Epi administered    0554:35: Pulse Check, no pulse    0554:45: Resume compressions    0556:45 ROSC, very faint pulse palpated    0557:30 30mcg push dose Epi administered    0558:22: Pulse check, no pulse    Time of Death: 0558

## 2025-02-27 NOTE — ED NOTES
This RN made contact with Amanda at [facility] ([phone]). Information provided and they request a hold pending their med record investigation. Ref number [id number].

## 2025-02-27 NOTE — ED NOTES
Pts clothing and dentures placed in belongings bag and placed inside body bag to go with pt to [facility].

## (undated) DEVICE — SUTURE ABSORBABLE BRAIDED 3-0 SHB 18 IN UD VICRYL + VCPB864D

## (undated) DEVICE — DRAPE,UTILITY,TAPE,15X26,STERILE: Brand: MEDLINE

## (undated) DEVICE — SNARE ENDOSCP L240CM SHTH DIA24MM LOOP W10MM POLYP RND REINF

## (undated) DEVICE — ENDOSCOPIC KIT 2 12 FT OP4 DE2 GWN SYR

## (undated) DEVICE — ELECTRODE,ECG,STRESS,FOAM,3PK: Brand: MEDLINE

## (undated) DEVICE — SUTURE PERMAHAND SZ 2-0 L12X18IN NONABSORBABLE BLK SILK A185H

## (undated) DEVICE — NEEDLE HYPO 25GA L1.5IN BVL ORIENTED ECLIPSE

## (undated) DEVICE — Device

## (undated) DEVICE — TRANSFER SET 3": Brand: MEDLINE INDUSTRIES, INC.

## (undated) DEVICE — SYRINGE MED 10ML LUERLOCK TIP W/O SFTY DISP

## (undated) DEVICE — MINOR SET UP: Brand: MEDLINE INDUSTRIES, INC.

## (undated) DEVICE — GLOVE SURG SZ 7.5 L11.73IN FNGR THK9.8MIL STRW LTX POLYMER

## (undated) DEVICE — NEEDLE HYPO 30GA L0.5IN BGE POLYPR HUB S STL REG BVL STR

## (undated) DEVICE — CONMED ACCESSORY ELECTRODE, NEEDLE ELECTRODE: Brand: CONMED

## (undated) DEVICE — MERCY HEALTH WEST TURNOVER: Brand: MEDLINE INDUSTRIES, INC.

## (undated) DEVICE — APPLIER CLP L9.375IN APER 2.1MM CLS L3.8MM 20 SM TI CLP

## (undated) DEVICE — ELECTRODE ELECSURG NDL 2.8 INX7.2 CM COAT INSUL EDGE

## (undated) DEVICE — TRAP SPEC POLYPR SGL CHMBR FN MESH SCRN

## (undated) DEVICE — CANNULA NSL AD TBNG L7FT PVC STR NONFLARED PRNG O2 DEL W STD

## (undated) DEVICE — TUBING, SUCTION, 1/4" X 12', STRAIGHT: Brand: MEDLINE

## (undated) DEVICE — SUTURE PERMAHAND SZ 4-0 L18IN NONABSORBABLE BLK SILK BRAID A183H

## (undated) DEVICE — ELECTRODE PT RET AD L9FT HI MOIST COND ADH HYDRGEL CORDED

## (undated) DEVICE — SUTURE VICRYL + SZ 3-0 L27IN ABSRB UD L26MM SH 1/2 CIR VCP416H